# Patient Record
Sex: FEMALE | Race: WHITE | Employment: OTHER | URBAN - METROPOLITAN AREA
[De-identification: names, ages, dates, MRNs, and addresses within clinical notes are randomized per-mention and may not be internally consistent; named-entity substitution may affect disease eponyms.]

---

## 2018-04-20 LAB
CHOLEST SERPL-MCNC: 228 MG/DL (ref 100–199)
HBA1C MFR BLD: 5.6 % (ref 0–5.7)
HDLC SERPL-MCNC: 61 MG/DL
LDLC SERPL CALC-MCNC: 118 MG/DL
NONHDLC SERPL-MCNC: 167 MG/DL
TRIGL SERPL-MCNC: 245 MG/DL
TSH SERPL-ACNC: 0.45 UIU/ML (ref 0.35–4.94)

## 2018-06-06 ENCOUNTER — TRANSFERRED RECORDS (OUTPATIENT)
Dept: HEALTH INFORMATION MANAGEMENT | Facility: CLINIC | Age: 58
End: 2018-06-06

## 2018-07-10 ENCOUNTER — TRANSFERRED RECORDS (OUTPATIENT)
Dept: HEALTH INFORMATION MANAGEMENT | Facility: CLINIC | Age: 58
End: 2018-07-10

## 2018-09-12 ENCOUNTER — TELEPHONE (OUTPATIENT)
Dept: FAMILY MEDICINE | Facility: CLINIC | Age: 58
End: 2018-09-12

## 2018-09-12 NOTE — TELEPHONE ENCOUNTER
Please abstract the following data from this visit with this patient into the appropriate field in Epic:    Lipids done on 4- at [facility]  A1C done on 4- at [facility]  TSH done on 06- at [facility]    Mammogram done on 12-8-2016 at [facility]-repeat in one year    Care everywhere was completed.

## 2018-10-03 NOTE — PROGRESS NOTES
HPI:    I am seeing Skylar Bagley for the 1st time. She is a 58 year old female here to discuss:    Type 2 diabetes - dx'd around around age 55. Checks glucose about 3 times per day. Results are around . Denies symptoms of high glucose. Gets lows about 2 times per week - corrects with pop.  Evaluation and treatment:    Eye exam - she says this is set up for 10/16/18 - I asked her to have them send us records.    Foot exam - fine 10/15/18   Urine albumin - negative 10/15/18.   ASA - to be recommended at age 60.   Metformin 850 mg bid - no side effects.   Glyburide 2.5 mg bid - likely causing hypoglycemia - I will recommend reducing to once per day or eliminating all together.   Counseled about the various complications of diabetes, weight loss via diet, exercise, checking glucose levels, self foot check and managing hypoglycemia.    Lab Results   Component Value Date    A1C 5.5 10/15/2018    A1C 5.6 04/20/2018    A1C 8.6 09/15/2007     Obesity -   Evaluation and treatment:    Diet and exercise discussed.    Body mass index is 31.52 kg/(m^2).  Wt Readings from Last 5 Encounters:   10/15/18 168 lb 3.2 oz (76.3 kg)       Dyslipidemia - no h/o vascular disease like CAD, PAD, CVA but has h/o diabetes.   Evaluation and treatment:    Per ATP, moderate to high instensity statin recommended.   Counseling done today regarding healthy weight, diet and exercise.    Lipitor 40 mg every day - no side effects.   Continue same tx.       Recent Labs   Lab Test  10/15/18   1407 04/20/18   CHOL  130  228*   HDL  55  61   LDL  60  118   TRIG  75  245*     RA -   Evaluation and treatment:    Follows with rheumatologist. Macy Felipe, [facility] - p [phone]  Fax [phone].    Hypothyroidism - denies thyroid type symptoms.  Evaluation and treatment:    Synthroid 112 mcg every day - no side effects.   She will be contacted and asked to reduce the dose to 100 mcg every day.     TSH   Date  "Value Ref Range Status   10/15/2018 0.08 (L) 0.40 - 4.00 mU/L Final     T4 Free   Date Value Ref Range Status   10/15/2018 1.21 0.76 - 1.46 ng/dL Final       Depression, psychosis -   Evaluation and treatment:    follows with psychiatry     Preventive - we gave her flu shot and and Prevnar. I advised to check with her rheumatologist about Shingrix vaccine.    Immunization History   Administered Date(s) Administered     Influenza Quad, Recombinant, p-free (RIV4) 10/15/2018     Influenza Vaccine IM 3yrs+ 4 Valent IIV4 10/07/2016, 10/18/2017     Pneumo Conj 13-V (2010&after) 10/15/2018     Pneumococcal 23 valent 04/19/2016     TD (ADULT, 7+) 09/13/2016       -STD screen: declines    -Mammogram: ordered    -PAP:     No results found for: PAP    - Colon CA screen: declines Colonoscopy. Accepted FIT which I ordered.    - Hep C screen:     - Advanced Directive:     - Lung cancer screen:       ROS:    Const: No fevers, weight changes or night sweats recently.  ENT: No runny nose, sore throat or ear pain.  Resp: No cough or shortness of breath.  CV: No chest pain, dizziness or cardiac palpitations.  GI: No nausea, vomiting, diarrhea or constipation. Denies blood in stools or black stools.  : No dysuria, frequency or hematuria.  The rest of the ROS negative, other than listed on HPI    SH:    Marital status:   Kids: 1 daughter  Employment: disabled  Exercise: walking 3 times per week  Tobacco: quit and restarted   Etoh: no  Recreational drugs: no  Caffeine: no    Exam:    /80  Pulse 92  Temp 97.3  F (36.3  C) (Oral)  Ht 5' 1.25\" (1.556 m)  Wt 168 lb 3.2 oz (76.3 kg)  SpO2 95%  BMI 31.52 kg/m2    Gen: Healthy appearing female in no acute distress  ENT: Oropharynx normal. Oral mucosa moist without lesions.  Eyes: Conjunctiva and sclera normal. Pupils react normally to light. No nystagmus.  Neck: No enlarged lymph nodes, thyromegally or other masses.  Lungs: Good air movement and otherwise clear.  CV: Heart " RRR with no murmurs. No JVD, carotid bruits or leg edema.  Abd: Soft, non tender, non distended, with normal bowel sounds. No liver or spleen enlargement. No masses. No hernias.  Psych: Affect is normal. She seems a bit sedated. Speech is fluent. Thought logical. Insight and judgement seem to be intact. Denies SI or HI.  Foot exam: Both feet appear normal by inspection. No calluses or other lesions. DP pluses are normal. The tips of the toes are warm with good capillary refill. Sensation intact per monofilament.        Assessment and Plan - Decision Making    1. Type 2 diabetes mellitus without complication, without long-term current use of insulin (H)    Per HPI    - CBC with platelets differential  - Albumin Random Urine Quantitative with Creat Ratio  - Hemoglobin A1c  - ACE/ARB/ARNI NOT PRESCRIBED, INTENTIONAL,; Please choose reason not prescribed, below  - T4 free  - T4 free    2. Dyslipidemia    Per HPI    - Lipid panel reflex to direct LDL Fasting  - Comprehensive metabolic panel  - atorvastatin (LIPITOR) 40 MG tablet; Take 1 tablet (40 mg) by mouth daily  Dispense: 90 tablet; Refill: 3    3. Class 1 obesity with serious comorbidity and body mass index (BMI) of 31.0 to 31.9 in adult, unspecified obesity type    Per HPI    4. Hypothyroidism, unspecified type    Per HPI    - TSH with free T4 reflex    5. Visit for screening mammogram    Per HPI    - MA Screening Digital Bilateral; Future    6. Screen for colon cancer    Per HPI    - Fecal colorectal cancer screen (FIT); Future    7. Need for vaccination    Per HPI    - Pneumococcal vaccine 13 valent PCV13 IM (Prevnar) [52101]  - ADMIN: Vaccine, Initial (24575)    8. Need for prophylactic vaccination and inoculation against influenza    Per HPI    - FLU VACCINE, (RIV4) RECOMBINANT HA  , IM (FluBlok, egg free) [18798]- >18 YRS ([facility] recommended  50-64 YRS)  - Vaccine Administration, Each Additional [70555]      Written instructions given as follows:    Patient  Instructions   1. I will contact you with results.    2. Don't forget to mail in the stool card.    3. If all is well, see you in 6 months for follow-up with fasting labs.

## 2018-10-15 ENCOUNTER — OFFICE VISIT (OUTPATIENT)
Dept: FAMILY MEDICINE | Facility: CLINIC | Age: 58
End: 2018-10-15
Payer: MEDICARE

## 2018-10-15 VITALS
HEIGHT: 61 IN | SYSTOLIC BLOOD PRESSURE: 127 MMHG | DIASTOLIC BLOOD PRESSURE: 80 MMHG | HEART RATE: 92 BPM | WEIGHT: 168.2 LBS | BODY MASS INDEX: 31.75 KG/M2 | OXYGEN SATURATION: 95 % | TEMPERATURE: 97.3 F

## 2018-10-15 DIAGNOSIS — E78.5 DYSLIPIDEMIA: ICD-10-CM

## 2018-10-15 DIAGNOSIS — Z12.31 VISIT FOR SCREENING MAMMOGRAM: ICD-10-CM

## 2018-10-15 DIAGNOSIS — Z23 NEED FOR PROPHYLACTIC VACCINATION AND INOCULATION AGAINST INFLUENZA: ICD-10-CM

## 2018-10-15 DIAGNOSIS — E03.9 HYPOTHYROIDISM, UNSPECIFIED TYPE: ICD-10-CM

## 2018-10-15 DIAGNOSIS — E66.811 CLASS 1 OBESITY WITH SERIOUS COMORBIDITY AND BODY MASS INDEX (BMI) OF 31.0 TO 31.9 IN ADULT, UNSPECIFIED OBESITY TYPE: ICD-10-CM

## 2018-10-15 DIAGNOSIS — Z12.11 SCREEN FOR COLON CANCER: ICD-10-CM

## 2018-10-15 DIAGNOSIS — Z23 NEED FOR VACCINATION: ICD-10-CM

## 2018-10-15 DIAGNOSIS — Z79.899 DRUG THERAPY: Primary | ICD-10-CM

## 2018-10-15 DIAGNOSIS — E11.9 TYPE 2 DIABETES MELLITUS WITHOUT COMPLICATION, WITHOUT LONG-TERM CURRENT USE OF INSULIN (H): Primary | ICD-10-CM

## 2018-10-15 LAB
ALBUMIN SERPL-MCNC: 4.3 G/DL (ref 3.4–5)
ALP SERPL-CCNC: 58 U/L (ref 40–150)
ALT SERPL W P-5'-P-CCNC: 35 U/L (ref 0–50)
ANION GAP SERPL CALCULATED.3IONS-SCNC: 8 MMOL/L (ref 3–14)
AST SERPL W P-5'-P-CCNC: 21 U/L (ref 0–45)
BASOPHILS # BLD AUTO: 0 10E9/L (ref 0–0.2)
BASOPHILS NFR BLD AUTO: 0.4 %
BILIRUB SERPL-MCNC: 0.2 MG/DL (ref 0.2–1.3)
BUN SERPL-MCNC: 15 MG/DL (ref 7–30)
CALCIUM SERPL-MCNC: 9.2 MG/DL (ref 8.5–10.1)
CHLORIDE SERPL-SCNC: 111 MMOL/L (ref 94–109)
CHOLEST SERPL-MCNC: 130 MG/DL
CO2 SERPL-SCNC: 22 MMOL/L (ref 20–32)
CREAT SERPL-MCNC: 0.96 MG/DL (ref 0.52–1.04)
DIFFERENTIAL METHOD BLD: NORMAL
EOSINOPHIL # BLD AUTO: 0.1 10E9/L (ref 0–0.7)
EOSINOPHIL NFR BLD AUTO: 1.5 %
ERYTHROCYTE [DISTWIDTH] IN BLOOD BY AUTOMATED COUNT: 12.8 % (ref 10–15)
GFR SERPL CREATININE-BSD FRML MDRD: 59 ML/MIN/1.7M2
GLUCOSE SERPL-MCNC: 71 MG/DL (ref 70–99)
HBA1C MFR BLD: 5.5 % (ref 0–5.6)
HCT VFR BLD AUTO: 38 % (ref 35–47)
HDLC SERPL-MCNC: 55 MG/DL
HGB BLD-MCNC: 12.8 G/DL (ref 11.7–15.7)
LDLC SERPL CALC-MCNC: 60 MG/DL
LYMPHOCYTES # BLD AUTO: 2.7 10E9/L (ref 0.8–5.3)
LYMPHOCYTES NFR BLD AUTO: 32.8 %
MCH RBC QN AUTO: 31.1 PG (ref 26.5–33)
MCHC RBC AUTO-ENTMCNC: 33.7 G/DL (ref 31.5–36.5)
MCV RBC AUTO: 92 FL (ref 78–100)
MONOCYTES # BLD AUTO: 0.6 10E9/L (ref 0–1.3)
MONOCYTES NFR BLD AUTO: 7.5 %
NEUTROPHILS # BLD AUTO: 4.7 10E9/L (ref 1.6–8.3)
NEUTROPHILS NFR BLD AUTO: 57.8 %
NONHDLC SERPL-MCNC: 75 MG/DL
PLATELET # BLD AUTO: 260 10E9/L (ref 150–450)
POTASSIUM SERPL-SCNC: 4 MMOL/L (ref 3.4–5.3)
PROT SERPL-MCNC: 7.8 G/DL (ref 6.8–8.8)
RBC # BLD AUTO: 4.12 10E12/L (ref 3.8–5.2)
SODIUM SERPL-SCNC: 141 MMOL/L (ref 133–144)
T4 FREE SERPL-MCNC: 1.21 NG/DL (ref 0.76–1.46)
TRIGL SERPL-MCNC: 75 MG/DL
TSH SERPL DL<=0.005 MIU/L-ACNC: 0.08 MU/L (ref 0.4–4)
WBC # BLD AUTO: 8.1 10E9/L (ref 4–11)

## 2018-10-15 PROCEDURE — 84443 ASSAY THYROID STIM HORMONE: CPT | Performed by: FAMILY MEDICINE

## 2018-10-15 PROCEDURE — 82043 UR ALBUMIN QUANTITATIVE: CPT | Performed by: FAMILY MEDICINE

## 2018-10-15 PROCEDURE — 84439 ASSAY OF FREE THYROXINE: CPT | Performed by: FAMILY MEDICINE

## 2018-10-15 PROCEDURE — G0009 ADMIN PNEUMOCOCCAL VACCINE: HCPCS | Performed by: FAMILY MEDICINE

## 2018-10-15 PROCEDURE — G0008 ADMIN INFLUENZA VIRUS VAC: HCPCS | Performed by: FAMILY MEDICINE

## 2018-10-15 PROCEDURE — 80061 LIPID PANEL: CPT | Performed by: FAMILY MEDICINE

## 2018-10-15 PROCEDURE — 82040 ASSAY OF SERUM ALBUMIN: CPT | Performed by: NURSE PRACTITIONER

## 2018-10-15 PROCEDURE — 90670 PCV13 VACCINE IM: CPT | Performed by: FAMILY MEDICINE

## 2018-10-15 PROCEDURE — 85025 COMPLETE CBC W/AUTO DIFF WBC: CPT | Performed by: FAMILY MEDICINE

## 2018-10-15 PROCEDURE — 80053 COMPREHEN METABOLIC PANEL: CPT | Performed by: FAMILY MEDICINE

## 2018-10-15 PROCEDURE — 36415 COLL VENOUS BLD VENIPUNCTURE: CPT | Performed by: FAMILY MEDICINE

## 2018-10-15 PROCEDURE — 83036 HEMOGLOBIN GLYCOSYLATED A1C: CPT | Performed by: FAMILY MEDICINE

## 2018-10-15 PROCEDURE — 90682 RIV4 VACC RECOMBINANT DNA IM: CPT | Performed by: FAMILY MEDICINE

## 2018-10-15 PROCEDURE — 99204 OFFICE O/P NEW MOD 45 MIN: CPT | Mod: 25 | Performed by: FAMILY MEDICINE

## 2018-10-15 RX ORDER — TRAZODONE HYDROCHLORIDE 100 MG/1
100 TABLET ORAL
COMMUNITY
Start: 2017-10-03 | End: 2019-05-15

## 2018-10-15 RX ORDER — LEVOTHYROXINE SODIUM 112 UG/1
TABLET ORAL
COMMUNITY
Start: 2018-06-25 | End: 2018-10-16

## 2018-10-15 RX ORDER — ATORVASTATIN CALCIUM 40 MG/1
40 TABLET, FILM COATED ORAL
COMMUNITY
Start: 2018-09-08 | End: 2018-10-16

## 2018-10-15 RX ORDER — FLUOXETINE 40 MG/1
40 CAPSULE ORAL
COMMUNITY
Start: 2018-09-06 | End: 2019-05-15

## 2018-10-15 RX ORDER — CLONAZEPAM 2 MG/1
TABLET ORAL
COMMUNITY
Start: 2017-06-30 | End: 2019-05-15

## 2018-10-15 RX ORDER — GLYBURIDE 5 MG/1
2.5 TABLET ORAL 2 TIMES DAILY
COMMUNITY
Start: 2018-06-06 | End: 2019-01-04

## 2018-10-15 RX ORDER — RISPERIDONE 3 MG/1
3 TABLET ORAL
COMMUNITY
Start: 2018-04-20 | End: 2019-06-21

## 2018-10-15 RX ORDER — FLUTICASONE PROPIONATE 50 MCG
1 SPRAY, SUSPENSION (ML) NASAL
COMMUNITY
Start: 2018-02-26 | End: 2019-12-13

## 2018-10-15 RX ORDER — ATORVASTATIN CALCIUM 40 MG/1
40 TABLET, FILM COATED ORAL DAILY
Qty: 90 TABLET | Refills: 3 | Status: SHIPPED | OUTPATIENT
Start: 2018-10-15 | End: 2019-09-10

## 2018-10-15 RX ORDER — IBUPROFEN 800 MG/1
800 TABLET, FILM COATED ORAL
COMMUNITY
Start: 2017-10-04 | End: 2018-11-25

## 2018-10-15 RX ORDER — HYDROXYCHLOROQUINE SULFATE 200 MG/1
300 TABLET, FILM COATED ORAL
COMMUNITY
Start: 2017-10-04 | End: 2018-11-25

## 2018-10-15 NOTE — MR AVS SNAPSHOT
After Visit Summary   10/15/2018    Skylar Bagley    MRN: [id number]           Patient Information     Date Of Birth          1960        Visit Information        Provider Department      10/15/2018 1:30 PM Toñito Cortez MD [facility]        Today's Diagnoses     Type 2 diabetes mellitus without complication, without long-term current use of insulin (H)    -  1    Dyslipidemia        Class 1 obesity with serious comorbidity and body mass index (BMI) of 31.0 to 31.9 in adult, unspecified obesity type        Hypothyroidism, unspecified type        Visit for screening mammogram        Screen for colon cancer          Care Instructions    1. I will contact you with results.    2. Don't forget to mail in the stool card.    3. If all is well, see you in 6 months for follow-up with fasting labs.          Follow-ups after your visit        Your next 10 appointments already scheduled     Nov 07, 2018  2:30 PM CST   New Visit with Jimbo Hancock MD   [facility] ([address] [address] [phone]              Future tests that were ordered for you today     Open Future Orders        Priority Expected Expires Ordered    Fecal colorectal cancer screen (FIT) Routine 11/5/2018 1/7/2019 10/15/2018    MA Screening Digital Bilateral Routine  10/15/2019 10/15/2018            Who to contact     If you have questions or need follow up information about today's clinic visit or your schedule please contact [facility] directly at [phone].  Normal or non-critical lab and imaging results will be communicated to you by MyChart, letter or phone within 4 business days after the clinic has received the results. If you do not hear from us within 7 days, please contact the clinic through MyChart or phone. If you have a critical or abnormal lab result, we will notify you by phone as soon as possible.  Submit refill  "requests through [facility] or call your pharmacy and they will forward the refill request to us. Please allow 3 business days for your refill to be completed.          Additional Information About Your Visit        Care EveryWhere ID     This is your Care EveryWhere ID. This could be used by other organizations to access your [facility] medical records  [id number]        Your Vitals Were     Pulse Temperature Height Pulse Oximetry BMI (Body Mass Index)       92 97.3  F (36.3  C) (Oral) 5' 1.25\" (1.556 m) 95% 31.52 kg/m2        Blood Pressure from Last 3 Encounters:   10/15/18 127/80    Weight from Last 3 Encounters:   10/15/18 168 lb 3.2 oz (76.3 kg)              We Performed the Following     Albumin Random Urine Quantitative with Creat Ratio     CBC with platelets differential     Comprehensive metabolic panel     Hemoglobin A1c     Lipid panel reflex to direct LDL Fasting     TSH with free T4 reflex          Today's Medication Changes          These changes are accurate as of 10/15/18  1:58 PM.  If you have any questions, ask your nurse or doctor.               Start taking these medicines.        Dose/Directions    ACE/ARB/ARNI NOT PRESCRIBED (INTENTIONAL)   Used for:  Type 2 diabetes mellitus without complication, without long-term current use of insulin (H)   Started by:  Toñito Cortez MD        Please choose reason not prescribed, below   Refills:  0         These medicines have changed or have updated prescriptions.        Dose/Directions    * atorvastatin 40 MG tablet   Commonly known as:  LIPITOR   This may have changed:  Another medication with the same name was added. Make sure you understand how and when to take each.   Changed by:  Toñito Cortez MD        Dose:  40 mg   Take 40 mg by mouth   Refills:  0       * atorvastatin 40 MG tablet   Commonly known as:  LIPITOR   This may have changed:  You were already taking a medication with the same name, and this prescription was added. Make sure you " understand how and when to take each.   Used for:  Dyslipidemia   Changed by:  Toñito Cortez MD        Dose:  40 mg   Take 1 tablet (40 mg) by mouth daily   Quantity:  90 tablet   Refills:  3       * Notice:  This list has 2 medication(s) that are the same as other medications prescribed for you. Read the directions carefully, and ask your doctor or other care provider to review them with you.         Where to get your medicines      These medications were sent to [facility] - [facility] [address]  [address], [facility] [address]     Phone:  [phone]     atorvastatin 40 MG tablet         Some of these will need a paper prescription and others can be bought over the counter.  Ask your nurse if you have questions.     You don't need a prescription for these medications     ACE/ARB/ARNI NOT PRESCRIBED (INTENTIONAL)                Primary Care Provider Office Phone # Fax #    [facility] [phone] [address] [address]        Equal Access to Services     MILY BACON AH: Hadii aad ku hadasho Soomaali, waaxda luqadaha, qaybta kaalmada adeegyada, waxay idiin haychristaln dorys valentine . So [facility] [phone].    ATENCIÓN: Si habla español, tiene a harris disposición servicios gratuitos de asistencia lingüística. Ll[facility] [phone].    We comply with applicable federal civil rights laws and [place] laws. We do not discriminate on the basis of race, color, national origin, age, disability, sex, sexual orientation, or gender identity.            Thank you!     Thank you for choosing [facility]  for your care. Our goal is always to provide you with excellent care. Hearing back from our patients is one way we can continue to improve our services. Please take a few minutes to complete the written survey that you may receive in the mail after your visit with us. Thank you!             Your Updated Medication List -  Protect others around you: Learn how to safely use, store and throw away your medicines at www.disposemymeds.org.          This list is accurate as of 10/15/18  1:58 PM.  Always use your most recent med list.                   Brand Name Dispense Instructions for use Diagnosis    ACE/ARB/ARNI NOT PRESCRIBED (INTENTIONAL)      Please choose reason not prescribed, below    Type 2 diabetes mellitus without complication, without long-term current use of insulin (H)       * atorvastatin 40 MG tablet    LIPITOR     Take 40 mg by mouth        * atorvastatin 40 MG tablet    LIPITOR    90 tablet    Take 1 tablet (40 mg) by mouth daily    Dyslipidemia       calcium carbonate 500 mg-vitamin D 200 units 500-200 MG-UNIT per tablet    OSCAL with D;OYSTER SHELL CALCIUM     Take 1 tablet by mouth        clonazePAM 2 MG tablet    klonoPIN     1 tablet AM and 2 tablets QHS        CONTOUR NEXT TEST test strip   Generic drug:  blood glucose monitoring      TEST 4 TIMES A DAY        etanercept 50 MG/ML injection    ENBREL     Inject 50 mg Subcutaneous once a week        FLUoxetine 40 MG capsule    PROzac     Take 40 mg by mouth        fluticasone 50 MCG/ACT spray    FLONASE     Spray 1 spray in nostril        glyBURIDE 5 MG tablet    DIABETA /MICRONASE     Take 2.5 mg by mouth 2 times daily        hydroxychloroquine 200 MG tablet    PLAQUENIL     Take 300 mg by mouth        ibuprofen 800 MG tablet    ADVIL/MOTRIN     Take 800 mg by mouth        Lancets 30G Misc      Test 4 times per day. ICD-10 code E11.9        levothyroxine 112 MCG tablet    SYNTHROID/LEVOTHROID          metFORMIN 850 MG tablet    GLUCOPHAGE     Take 850 mg by mouth        risperiDONE 3 MG tablet    risperDAL     Take 3 mg by mouth        traZODone 100 MG tablet    DESYREL     Take 100 mg by mouth        UNABLE TO FIND      remicade        UNABLE TO FIND      resperidone        * Notice:  This list has 2 medication(s) that are the same as other medications prescribed  for you. Read the directions carefully, and ask your doctor or other care provider to review them with you.

## 2018-10-15 NOTE — PATIENT INSTRUCTIONS
1. I will contact you with results.    2. Don't forget to mail in the stool card.    3. If all is well, see you in 6 months for follow-up with fasting labs.

## 2018-10-15 NOTE — PROGRESS NOTES

## 2018-10-15 NOTE — NURSING NOTE
Screening Questionnaire for Adult Immunization    Are you sick today?   No   Do you have allergies to medications, food, a vaccine component or latex?   No   Have you ever had a serious reaction after receiving a vaccination?   No   Do you have a long-term health problem with heart disease, lung disease, asthma, kidney disease, metabolic disease (e.g. diabetes), anemia, or other blood disorder?   No   Do you have cancer, leukemia, HIV/AIDS, or any other immune system problem?   No   In the past 3 months, have you taken medications that affect  your immune system, such as prednisone, other steroids, or anticancer drugs; drugs for the treatment of rheumatoid arthritis, Crohn s disease, or psoriasis; or have you had radiation treatments?   Yes   Have you had a seizure, or a brain or other nervous system problem?   No   During the past year, have you received a transfusion of blood or blood     products, or been given immune (gamma) globulin or antiviral drug?   No   For women: Are you pregnant or is there a chance you could become        pregnant during the next month?   No   Have you received any vaccinations in the past 4 weeks?   No     Immunization questionnaire was positive for at least one answer.  Notified Dr Cortez.        Per orders of Dr. Cortez, injection of Prevnar & Influenza given by Shanta Costello. Patient instructed to remain in clinic for 15 minutes afterwards, and to report any adverse reaction to me immediately.       Screening performed by Shanta Costello on 10/15/2018 at 1:56 PM.

## 2018-10-16 ENCOUNTER — TRANSFERRED RECORDS (OUTPATIENT)
Dept: HEALTH INFORMATION MANAGEMENT | Facility: CLINIC | Age: 58
End: 2018-10-16

## 2018-10-16 DIAGNOSIS — M06.09 RHEUMATOID ARTHRITIS OF MULTIPLE SITES WITHOUT RHEUMATOID FACTOR (H): Primary | ICD-10-CM

## 2018-10-16 DIAGNOSIS — Z79.899 HIGH RISK MEDICATION USE: ICD-10-CM

## 2018-10-16 LAB
ALBUMIN SERPL-MCNC: 4.4 G/DL (ref 3.4–5)
CREAT UR-MCNC: 63 MG/DL
MICROALBUMIN UR-MCNC: 5 MG/L
MICROALBUMIN/CREAT UR: 8.45 MG/G CR (ref 0–25)

## 2018-10-16 RX ORDER — LEVOTHYROXINE SODIUM 100 UG/1
100 TABLET ORAL DAILY
Qty: 90 TABLET | Refills: 3 | Status: SHIPPED | OUTPATIENT
Start: 2018-10-16 | End: 2019-07-22

## 2018-10-17 ENCOUNTER — TELEPHONE (OUTPATIENT)
Dept: FAMILY MEDICINE | Facility: CLINIC | Age: 58
End: 2018-10-17

## 2018-10-17 NOTE — TELEPHONE ENCOUNTER
Please call patient:     1. Your diabetes is well controlled. Since you are having some low glucose levels, you should reduce Glyburide to once per day or even eliminate all together.       2. The thyroid dose is too high. Reduce the Synthroid to 100 mcg daily.      3. All other results are fine.     4. See you in 6 months. Please don't forget to mail in the stool card    Toñito Cortez M.D.

## 2018-10-17 NOTE — TELEPHONE ENCOUNTER
Pt notified of provider message as written.  Pt verbalized good understanding.      Pt requests the results and result note be faxed to her rheumatologist.    July NAIDUN, RN

## 2018-10-17 NOTE — PROGRESS NOTES
We will recommend reducing Glyburide to once per day or even eliminate all together. We will also have her reduce the Synthroid to 100 mcg daily.     See telephone encounter.    Toñito Cortez M.D.

## 2018-10-17 NOTE — LETTER
October 17, 2018    Skylar Bagley  [address]  [place] [address]        Dear Skylar,    We will recommend reducing Glyburide to once per day or even eliminate all together. We will also have her reduce the Synthroid to 100 mcg daily.       Toñito Cortez M.D.    Results for orders placed or performed in visit on 10/15/18   Albumin level   Result Value Ref Range    Albumin 4.4 3.4 - 5.0 g/dL

## 2018-10-17 NOTE — TELEPHONE ENCOUNTER
Mailed lab letter to patient's home.     Faxed labs to Dr Matthews at [facility] @ [phone]    Yamileth Cueva MA/ASHUTOSH

## 2018-10-17 NOTE — TELEPHONE ENCOUNTER
Please abstract the following data from this visit with this patient into the appropriate field in Epic:    Eye exam with ophthalmology on this date: 10/16/18

## 2018-10-19 NOTE — TELEPHONE ENCOUNTER
Patient states needs labs faxed to rheumatologist. Attn: Macy. [phone]    Inna NAIDUN, RN, CPN

## 2018-10-24 PROCEDURE — 82274 ASSAY TEST FOR BLOOD FECAL: CPT | Performed by: FAMILY MEDICINE

## 2018-10-28 DIAGNOSIS — Z12.11 SCREEN FOR COLON CANCER: ICD-10-CM

## 2018-10-28 LAB — HEMOCCULT STL QL IA: NEGATIVE

## 2018-11-01 ENCOUNTER — TELEPHONE (OUTPATIENT)
Dept: FAMILY MEDICINE | Facility: CLINIC | Age: 58
End: 2018-11-01

## 2018-11-01 NOTE — TELEPHONE ENCOUNTER
Pt states her rheumatologist Dr. Knott took her off of ibuprofen and omeprazole 30 mg daily after receiving the last lab results from Dr. Toñito Cortez.  Pt states Dr. Knott said the results showed pt had an ulcer and should not be on these meds.  Pt started the omeprazole after her appointment with Dr. Toñito Cortez.  She states her plaquenil was stopped due to it damaging her eyes.  She only takes Enbrel for RA pain now.  She is asking what else she can take and would like Dr. Toñito Cortez to prescribe omeprazole and ibuprofen for her.    I asked for Dr. Knott's office phone number to clarify what is going on, ph. [phone].    I called and spoke with Dr. Knott's nurse.  Pt was seen at there clinic on Oct 9 and reported severe abdominal pain and reflux.  There provider gave her enough omeprazole to make it to her appointment with Dr. Toñito Cortez and she was to ask him to evaluate her for a stomach ulcer.  His findings would determine if they could continue to prescribe ibuprofen.   No mention of this in Dr. Toñito Cortez's ov notes.     I called pt and advised her rheumatologist wants Dr. Toñito Cortez to evaluate her for an ulcer before prescribing more medication.  I offered her appointment Friday morning and Monday morning and she declined.  Appointment made for Monday afternoon.   To provider as MAGUE.  July MOSQUEDA, RN

## 2018-11-01 NOTE — TELEPHONE ENCOUNTER
Patient calling she would like to discuss her medications and her lab results. She needs a prescription for omeprazole and something for pain.

## 2018-11-05 ENCOUNTER — TELEPHONE (OUTPATIENT)
Dept: FAMILY MEDICINE | Facility: CLINIC | Age: 58
End: 2018-11-05

## 2018-11-05 NOTE — TELEPHONE ENCOUNTER
Pt asking what last lab letter she received meant.  Advised she had a negative or normal result on her FIT card and that test is screening for colon cancer.  No further questions.    I asked pt why she cancelled her appointment today.  She states she does not have a ride. Appointment rescheduled for Thursday.  July NAIDUN, RN

## 2018-11-12 ENCOUNTER — HEALTH MAINTENANCE LETTER (OUTPATIENT)
Age: 58
End: 2018-11-12

## 2018-11-23 ENCOUNTER — OFFICE VISIT (OUTPATIENT)
Dept: FAMILY MEDICINE | Facility: CLINIC | Age: 58
End: 2018-11-23
Payer: MEDICARE

## 2018-11-23 VITALS
DIASTOLIC BLOOD PRESSURE: 74 MMHG | HEART RATE: 96 BPM | TEMPERATURE: 98 F | SYSTOLIC BLOOD PRESSURE: 120 MMHG | OXYGEN SATURATION: 96 % | WEIGHT: 170 LBS | BODY MASS INDEX: 31.86 KG/M2

## 2018-11-23 DIAGNOSIS — M06.9 RHEUMATOID ARTHRITIS, INVOLVING UNSPECIFIED SITE, UNSPECIFIED RHEUMATOID FACTOR PRESENCE: ICD-10-CM

## 2018-11-23 DIAGNOSIS — K21.9 GASTROESOPHAGEAL REFLUX DISEASE, ESOPHAGITIS PRESENCE NOT SPECIFIED: Primary | ICD-10-CM

## 2018-11-23 PROCEDURE — 99214 OFFICE O/P EST MOD 30 MIN: CPT | Performed by: FAMILY MEDICINE

## 2018-11-23 RX ORDER — OMEPRAZOLE 40 MG/1
CAPSULE, DELAYED RELEASE ORAL
COMMUNITY
Start: 2018-10-12 | End: 2020-01-23

## 2018-11-23 RX ORDER — IBUPROFEN 800 MG/1
800 TABLET, FILM COATED ORAL 2 TIMES DAILY PRN
Qty: 90 TABLET | Refills: 1 | Status: SHIPPED | OUTPATIENT
Start: 2018-11-23 | End: 2019-02-04

## 2018-11-23 ASSESSMENT — ANXIETY QUESTIONNAIRES
5. BEING SO RESTLESS THAT IT IS HARD TO SIT STILL: SEVERAL DAYS
2. NOT BEING ABLE TO STOP OR CONTROL WORRYING: SEVERAL DAYS
GAD7 TOTAL SCORE: 6
1. FEELING NERVOUS, ANXIOUS, OR ON EDGE: SEVERAL DAYS
IF YOU CHECKED OFF ANY PROBLEMS ON THIS QUESTIONNAIRE, HOW DIFFICULT HAVE THESE PROBLEMS MADE IT FOR YOU TO DO YOUR WORK, TAKE CARE OF THINGS AT HOME, OR GET ALONG WITH OTHER PEOPLE: SOMEWHAT DIFFICULT
7. FEELING AFRAID AS IF SOMETHING AWFUL MIGHT HAPPEN: NOT AT ALL
6. BECOMING EASILY ANNOYED OR IRRITABLE: SEVERAL DAYS
3. WORRYING TOO MUCH ABOUT DIFFERENT THINGS: SEVERAL DAYS

## 2018-11-23 ASSESSMENT — PATIENT HEALTH QUESTIONNAIRE - PHQ9
5. POOR APPETITE OR OVEREATING: SEVERAL DAYS
SUM OF ALL RESPONSES TO PHQ QUESTIONS 1-9: 6

## 2018-11-23 NOTE — NURSING NOTE
"Chief Complaint   Patient presents with     RECHECK     Ulcer       Initial /74 (Cuff Size: Adult Regular)  Pulse 96  Temp 98  F (36.7  C) (Oral)  Wt 170 lb (77.1 kg)  SpO2 96%  BMI 31.86 kg/m2 Estimated body mass index is 31.86 kg/(m^2) as calculated from the following:    Height as of 10/15/18: 5' 1.25\" (1.556 m).    Weight as of this encounter: 170 lb (77.1 kg).      Courtney Pierre LPN    "

## 2018-11-23 NOTE — PROGRESS NOTES
HPI:    Skylar  is a 58 year old female here to discuss:    GERD - she used to get heart burn a lot - mostly epigastric pain without any chest or throat burning. When she stopped her Plaquenil, her pain resolved. She asked her rheumatologist for Ibuprofen but they told her she needed testing for stomach ulcer first.  Evaluation and treatment:    We discussed doing an EGD but since her pain has resolved after discontinuing Plaquenil, there is not need.   I called her rheumatologist (see below for phone number) but they are closed today.   I will have my nurse call next week.    RA -   Evaluation and treatment:    Follows with rheumatologist. Macy Felipe, [facility] - Phone [phone]  Fax [phone].   The patient wanted Ibuprofen refill - I gave that after discussing side effects.   She wants to see a different rheumatologist - I gave her referral to [facility].    The following issues were reviewed but not addressed today.    Type 2 diabetes - dx'd around around age 55. Checks glucose about 3 times per day. Results are around . Denies symptoms of high glucose. Gets lows about 2 times per week - corrects with pop.  Evaluation and treatment:    Eye exam - she says this is set up for 10/16/18 - I asked her to have them send us records.    Foot exam - fine 10/15/18   Urine albumin - negative 10/15/18.   ASA - to be recommended at age 60.   Metformin 850 mg bid - no side effects.   Glyburide 2.5 mg bid - likely causing hypoglycemia - I will recommend reducing to once per day or eliminating all together.   Counseled about the various complications of diabetes, weight loss via diet, exercise, checking glucose levels, self foot check and managing hypoglycemia.    Lab Results   Component Value Date    A1C 5.5 10/15/2018    A1C 5.6 04/20/2018    A1C 8.6 09/15/2007     Obesity -   Evaluation and treatment:    Diet and exercise discussed.    Body mass index is 31.86 kg/(m^2).  Wt  Readings from Last 5 Encounters:   11/23/18 170 lb (77.1 kg)   10/15/18 168 lb 3.2 oz (76.3 kg)       Dyslipidemia - no h/o vascular disease like CAD, PAD, CVA but has h/o diabetes.   Evaluation and treatment:    Per ATP, moderate to high instensity statin recommended.   Counseling done today regarding healthy weight, diet and exercise.    Lipitor 40 mg every day - no side effects.   Continue same tx.       Recent Labs   Lab Test  10/15/18   1407 04/20/18   CHOL  130  228*   HDL  55  61   LDL  60  118   TRIG  75  245*       Hypothyroidism - denies thyroid type symptoms.  Evaluation and treatment:    Synthroid 112 mcg every day - no side effects.   She will be contacted and asked to reduce the dose to 100 mcg every day.     TSH   Date Value Ref Range Status   10/15/2018 0.08 (L) 0.40 - 4.00 mU/L Final     T4 Free   Date Value Ref Range Status   10/15/2018 1.21 0.76 - 1.46 ng/dL Final       Depression, psychosis -   Evaluation and treatment:    follows with psychiatry     Preventive - we gave her flu shot and and Prevnar. I advised to check with her rheumatologist about Shingrix vaccine.    Immunization History   Administered Date(s) Administered     Influenza Quad, Recombinant, p-free (RIV4) 10/15/2018     Influenza Vaccine IM 3yrs+ 4 Valent IIV4 10/07/2016, 10/18/2017     Pneumo Conj 13-V (2010&after) 10/15/2018     Pneumococcal 23 valent 04/19/2016     TD (ADULT, 7+) 09/13/2016       -STD screen: declines    -Mammogram: ordered previously.    -PAP:     No results found for: PAP    - Colon CA screen: declines Colonoscopy. Accepted FIT - negative 10/24/18.    - Hep C screen:     - Advanced Directive:     - Lung cancer screen:       ROS:    Const: No fevers, weight changes or night sweats recently.  ENT: No runny nose, sore throat or ear pain.  Resp: No cough or shortness of breath.  CV: No chest pain, dizziness or cardiac palpitations.  GI: No nausea, vomiting, diarrhea or constipation. Denies blood in stools or  "black stools.  : No dysuria, frequency or hematuria.  The rest of the ROS negative, other than listed on HPI    SH:    Marital status:   Kids: 1 daughter  Employment: disabled  Exercise: walking 3 times per week  Tobacco: quit and restarted   Etoh: no  Recreational drugs: no  Caffeine: no    Exam:    /74 (Cuff Size: Adult Regular)  Pulse 96  Temp 98  F (36.7  C) (Oral)  Wt 170 lb (77.1 kg)  SpO2 96%  BMI 31.86 kg/m2    Gen: Healthy appearing female in no acute distress  Neck: No enlarged lymph nodes, thyromegally or other masses.  Lungs: Good air movement and otherwise clear.  CV: Heart RRR with no murmurs. No JVD, carotid bruits or leg edema.  Abd: Soft, non tender, non distended, with normal bowel sounds. No liver or spleen enlargement. No masses. No hernias.  Psych: Affect is normal. She seems a bit drowsy at baseline. Speech is fluent but slow at baseline. Thought logical. Insight and judgement seem to be intact. Denies SI or HI.    Assessment and Plan - Decision Making    1. Gastroesophageal reflux disease, esophagitis presence not specified    Per HPI      2. Rheumatoid arthritis, involving unspecified site, unspecified rheumatoid factor presence (H)    Per HPI    - ibuprofen (ADVIL/MOTRIN) 800 MG tablet; Take 1 tablet (800 mg) by mouth 2 times daily as needed for moderate pain  Dispense: 90 tablet; Refill: 1  - RHEUMATOLOGY REFERRAL      Written instructions given as follows:    Patient Instructions   Your rheumatology clinic is closed today. I will contact them next week and ask about this \"ulcer test.\"    I will get back to you after that.    If all is well, see you April for follow-up with fasting labs.      "

## 2018-11-23 NOTE — MR AVS SNAPSHOT
"              After Visit Summary   11/23/2018    Skylar Bagley    MRN: [id number]           Patient Information     Date Of Birth          1960        Visit Information        Provider Department      11/23/2018 1:50 PM Toñito Cortez MD [facility]        Today's Diagnoses     Gastroesophageal reflux disease, esophagitis presence not specified    -  1      Care Instructions    Your rheumatology clinic is closed today. I will contact them next week and ask about this \"ulcer test.\"    I will get back to you after that.    If all is well, see you April for follow-up with fasting labs.          Follow-ups after your visit        Follow-up notes from your care team     Return in about 5 months (around 4/23/2019) for Lab Work, Routine Visit.      Your next 10 appointments already scheduled     Jan 07, 2019  1:15 PM CST   New Visit with Jimbo Hancock MD   [facility] ([facility]    [address] [place] [id number] [phone]              Who to contact     If you have questions or need follow up information about today's clinic visit or your schedule please contact [facility] directly at [phone].  Normal or non-critical lab and imaging results will be communicated to you by MyChart, letter or phone within 4 business days after the clinic has received the results. If you do not hear from us within 7 days, please contact the clinic through MyChart or phone. If you have a critical or abnormal lab result, we will notify you by phone as soon as possible.  Submit refill requests through [employer] or call your pharmacy and they will forward the refill request to us. Please allow 3 business days for your refill to be completed.          Additional Information About Your Visit        Care EveryWhere ID     This is your Care EveryWhere ID. This could be used by other organizations to access your [place] medical records  [id number]      "   Your Vitals Were     Pulse Temperature Pulse Oximetry BMI (Body Mass Index)          96 98  F (36.7  C) (Oral) 96% 31.86 kg/m2         Blood Pressure from Last 3 Encounters:   11/23/18 120/74   10/15/18 127/80    Weight from Last 3 Encounters:   11/23/18 170 lb (77.1 kg)   10/15/18 168 lb 3.2 oz (76.3 kg)              Today, you had the following     No orders found for display       Primary Care Provider Office Phone # Fax #    Toñito Cotrez [phone] [address] [facility] [address]        Equal Access to Services     [facility] ARLEEN : Hadii mirtha calzada hadasho Sowale, waaxda luqadaha, qaybta kaalmada adechi, nikia valentine . So [facility] [phone].    ATENCIÓN: Si habla español, tiene a harris disposición servicios gratuitos de asistencia lingüística. Llame al [phone].    We comply with applicable federal civil rights laws and [place] laws. We do not discriminate on the basis of race, color, national origin, age, disability, sex, sexual orientation, or gender identity.            Thank you!     Thank you for choosing [facility]  for your care. Our goal is always to provide you with excellent care. Hearing back from our patients is one way we can continue to improve our services. Please take a few minutes to complete the written survey that you may receive in the mail after your visit with us. Thank you!             Your Updated Medication List - Protect others around you: Learn how to safely use, store and throw away your medicines at www.disposemymeds.org.          This list is accurate as of 11/23/18  2:32 PM.  Always use your most recent med list.                   Brand Name Dispense Instructions for use Diagnosis    ACE/ARB/ARNI NOT PRESCRIBED (INTENTIONAL)      Please choose reason not prescribed, below    Type 2 diabetes mellitus without complication, without long-term current use of insulin (H)       ASPIRIN NOT PRESCRIBED     INTENTIONAL    1 each    Please choose reason not prescribed, below    Type 2 diabetes mellitus without complication, without long-term current use of insulin (H)       atorvastatin 40 MG tablet    LIPITOR    90 tablet    Take 1 tablet (40 mg) by mouth daily    Dyslipidemia       calcium carbonate 500 mg-vitamin D 200 units 500-200 MG-UNIT per tablet    OSCAL with D;OYSTER SHELL CALCIUM     Take 1 tablet by mouth        clonazePAM 2 MG tablet    klonoPIN     1 tablet AM and 2 tablets QHS        CONTOUR NEXT TEST test strip   Generic drug:  blood glucose monitoring      TEST 4 TIMES A DAY        etanercept 50 MG/ML injection    ENBREL     Inject 50 mg Subcutaneous once a week        FLUoxetine 40 MG capsule    PROzac     Take 40 mg by mouth        fluticasone 50 MCG/ACT spray    FLONASE     Spray 1 spray in nostril        glyBURIDE 5 MG tablet    DIABETA /MICRONASE     Take 2.5 mg by mouth 2 times daily        hydroxychloroquine 200 MG tablet    PLAQUENIL     Take 300 mg by mouth        ibuprofen 800 MG tablet    ADVIL/MOTRIN     Take 800 mg by mouth        Lancets 30G Misc      Test 4 times per day. ICD-10 code E11.9        levothyroxine 100 MCG tablet    SYNTHROID/LEVOTHROID    90 tablet    Take 1 tablet (100 mcg) by mouth daily    Hypothyroidism, unspecified type       metFORMIN 850 MG tablet    GLUCOPHAGE     Take 850 mg by mouth        omeprazole 40 MG capsule    priLOSEC          risperiDONE 3 MG tablet    risperDAL     Take 3 mg by mouth        traZODone 100 MG tablet    DESYREL     Take 100 mg by mouth        UNABLE TO FIND      remicade        UNABLE TO FIND      resperidone

## 2018-11-23 NOTE — PATIENT INSTRUCTIONS
"Your rheumatology clinic is closed today. I will contact them next week and ask about this \"ulcer test.\"    I will get back to you after that.    If all is well, see you April for follow-up with fasting labs.  "

## 2018-11-24 ASSESSMENT — ANXIETY QUESTIONNAIRES: GAD7 TOTAL SCORE: 6

## 2018-11-25 ENCOUNTER — TELEPHONE (OUTPATIENT)
Dept: FAMILY MEDICINE | Facility: CLINIC | Age: 58
End: 2018-11-25

## 2018-11-25 NOTE — TELEPHONE ENCOUNTER
Please call Macy Felipe CNP [facility] - Phone [phone].    The patient saw me on 11/23/18 asking for a stomach ulcer test, based on your recommendation. Do you know what test you she is talking about?    Her epigastric pain has resolved since stopping Plaquenil. Does that change your recommendation?    Toñito Cortez M.D.

## 2018-11-26 NOTE — TELEPHONE ENCOUNTER
See 11/1/18 message.  Discussed with Dr. Toñito Cortez who advises pt no longer having any abdominal pain or reflux sx.  Per pt they stopped when she stopped Plaquenil.  Pt is ok to take ibuprofen.  Dr. Toñito Cortez has sent an prescription  for 800 mg bid for pt to the pharmacy.  He is comfortable filling this for her if rheumatology is ok with that.    Left message on answering machine for rheumatology Dr. Knott nurse to call back to [phone].  July NAIDUN, RN

## 2018-11-26 NOTE — TELEPHONE ENCOUNTER
I spoke with nurse Janet that works with Dr. Knott.  She states both Macy Felipe CNP and previous nurse I spoke with are no longer working at that clinic.  I advised her of previous message between me and nurse on 11/1/18.  I advised her of Dr. Toñito Cortez's note and not concerns currently for ulcers and pt symptoms have resolved.  Advised Dr. Toñito Cortez ok with pt taking ibuprofen 800 mg bid and has done prescription and can continue if Dr. Knott would like or they can take over.  She will get message to Dr. Knott.    To provider to cosign for verbal clarifications on first message.  July Mosqueda BSN, RN

## 2018-12-07 ENCOUNTER — TELEPHONE (OUTPATIENT)
Dept: FAMILY MEDICINE | Facility: CLINIC | Age: 58
End: 2018-12-07

## 2018-12-07 NOTE — TELEPHONE ENCOUNTER
Panel Management Review      Patient has the following on her problem list:     Diabetes    ASA: Failed    Last A1C  Lab Results   Component Value Date    A1C 5.5 10/15/2018    A1C 5.6 04/20/2018    A1C 8.6 09/15/2007     A1C tested: Passed    Last LDL:    Lab Results   Component Value Date    CHOL 130 10/15/2018     Lab Results   Component Value Date    HDL 55 10/15/2018     Lab Results   Component Value Date    LDL 60 10/15/2018     Lab Results   Component Value Date    TRIG 75 10/15/2018     No results found for: CHOLHDLRATIO  Lab Results   Component Value Date    NHDL 75 10/15/2018       Is the patient on a Statin? YES             Is the patient on Aspirin? NO    Medications     HMG CoA Reductase Inhibitors    atorvastatin (LIPITOR) 40 MG tablet          Last three blood pressure readings:  BP Readings from Last 3 Encounters:   11/23/18 120/74   10/15/18 127/80       Date of last diabetes office visit: 10-     Tobacco History:     History   Smoking Status     Current Every Day Smoker   Smokeless Tobacco     Never Used           Composite cancer screening  Chart review shows that this patient is due/due soon for the following Pap Smear and Mammogram  Summary:    Patient is due/failing the following:   ASA, MAMMOGRAM and PAP    Action needed:   None needed-patient advised to follow up in April 2019    Type of outreach:    none needed    Questions for provider review:    None                                                                                                                                    Courtney Pierre LPN       Chart routed to closed .

## 2018-12-19 ENCOUNTER — TELEPHONE (OUTPATIENT)
Dept: FAMILY MEDICINE | Facility: CLINIC | Age: 58
End: 2018-12-19

## 2019-01-04 ENCOUNTER — OFFICE VISIT (OUTPATIENT)
Dept: FAMILY MEDICINE | Facility: CLINIC | Age: 59
End: 2019-01-04
Payer: COMMERCIAL

## 2019-01-04 VITALS
OXYGEN SATURATION: 98 % | WEIGHT: 168 LBS | TEMPERATURE: 98 F | DIASTOLIC BLOOD PRESSURE: 77 MMHG | BODY MASS INDEX: 31.72 KG/M2 | HEART RATE: 80 BPM | HEIGHT: 61 IN | SYSTOLIC BLOOD PRESSURE: 122 MMHG

## 2019-01-04 DIAGNOSIS — Z11.3 SCREEN FOR STD (SEXUALLY TRANSMITTED DISEASE): ICD-10-CM

## 2019-01-04 DIAGNOSIS — Z79.899 HIGH RISK MEDICATION USE: ICD-10-CM

## 2019-01-04 DIAGNOSIS — G62.9 PERIPHERAL POLYNEUROPATHY: Primary | ICD-10-CM

## 2019-01-04 DIAGNOSIS — M06.09 RHEUMATOID ARTHRITIS OF MULTIPLE SITES WITHOUT RHEUMATOID FACTOR (H): ICD-10-CM

## 2019-01-04 DIAGNOSIS — J32.9 SINUSITIS, UNSPECIFIED CHRONICITY, UNSPECIFIED LOCATION: ICD-10-CM

## 2019-01-04 LAB
ALBUMIN SERPL-MCNC: 4.5 G/DL (ref 3.4–5)
ALT SERPL W P-5'-P-CCNC: 30 U/L (ref 0–50)
AST SERPL W P-5'-P-CCNC: 15 U/L (ref 0–45)
CREAT SERPL-MCNC: 0.98 MG/DL (ref 0.52–1.04)
ERYTHROCYTE [DISTWIDTH] IN BLOOD BY AUTOMATED COUNT: 12.9 % (ref 10–15)
GFR SERPL CREATININE-BSD FRML MDRD: 63 ML/MIN/{1.73_M2}
HCT VFR BLD AUTO: 40 % (ref 35–47)
HGB BLD-MCNC: 13.3 G/DL (ref 11.7–15.7)
MCH RBC QN AUTO: 30.5 PG (ref 26.5–33)
MCHC RBC AUTO-ENTMCNC: 33.3 G/DL (ref 31.5–36.5)
MCV RBC AUTO: 92 FL (ref 78–100)
PLATELET # BLD AUTO: 272 10E9/L (ref 150–450)
RBC # BLD AUTO: 4.36 10E12/L (ref 3.8–5.2)
VIT B12 SERPL-MCNC: 347 PG/ML (ref 193–986)
WBC # BLD AUTO: 9 10E9/L (ref 4–11)

## 2019-01-04 PROCEDURE — 82607 VITAMIN B-12: CPT | Performed by: FAMILY MEDICINE

## 2019-01-04 PROCEDURE — 86780 TREPONEMA PALLIDUM: CPT | Performed by: FAMILY MEDICINE

## 2019-01-04 PROCEDURE — 36415 COLL VENOUS BLD VENIPUNCTURE: CPT | Performed by: FAMILY MEDICINE

## 2019-01-04 PROCEDURE — 85027 COMPLETE CBC AUTOMATED: CPT | Performed by: FAMILY MEDICINE

## 2019-01-04 PROCEDURE — 82040 ASSAY OF SERUM ALBUMIN: CPT | Performed by: NURSE PRACTITIONER

## 2019-01-04 PROCEDURE — 99214 OFFICE O/P EST MOD 30 MIN: CPT | Performed by: FAMILY MEDICINE

## 2019-01-04 PROCEDURE — 82565 ASSAY OF CREATININE: CPT | Performed by: FAMILY MEDICINE

## 2019-01-04 PROCEDURE — 87491 CHLMYD TRACH DNA AMP PROBE: CPT | Performed by: FAMILY MEDICINE

## 2019-01-04 PROCEDURE — 84460 ALANINE AMINO (ALT) (SGPT): CPT | Performed by: NURSE PRACTITIONER

## 2019-01-04 PROCEDURE — 84450 TRANSFERASE (AST) (SGOT): CPT | Performed by: NURSE PRACTITIONER

## 2019-01-04 PROCEDURE — 87389 HIV-1 AG W/HIV-1&-2 AB AG IA: CPT | Performed by: FAMILY MEDICINE

## 2019-01-04 PROCEDURE — 87591 N.GONORRHOEAE DNA AMP PROB: CPT | Performed by: FAMILY MEDICINE

## 2019-01-04 RX ORDER — AMOXICILLIN 500 MG/1
1000 TABLET, FILM COATED ORAL 2 TIMES DAILY
Qty: 28 TABLET | Refills: 0 | Status: SHIPPED | OUTPATIENT
Start: 2019-01-04 | End: 2019-01-11

## 2019-01-04 RX ORDER — GABAPENTIN 100 MG/1
100 CAPSULE ORAL 3 TIMES DAILY
Qty: 270 CAPSULE | Refills: 3 | Status: SHIPPED | OUTPATIENT
Start: 2019-01-04 | End: 2019-04-22

## 2019-01-04 ASSESSMENT — MIFFLIN-ST. JEOR: SCORE: 1283.38

## 2019-01-04 NOTE — PATIENT INSTRUCTIONS
1. Amoxicillin as prescribed.    2. Gabapentin as prescribed.    3. I will contact you with results.    4. Stop the Glyburide.    4. See you in April fasting.

## 2019-01-04 NOTE — PROGRESS NOTES
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 Slow moving, slow talking.  Evaluation and treatment:    This is usually what I would see with parkinson's.   But I think hers is on the basis of her psychiatric issues.    Peripheral neuropathy - since around Oct 2018 she has had \"pins and needles\" in both feet. Sometimes comes up the legs. No focal weakness.   Evaluation and treatment:   B12 level normal at 347 today 1/4/18.   I think this may be related to her diabetes.   I asked her to try Gabapentin 100 mg tid - side effects discussed.    Sinusitis - since around Oct 2018, she has had sinus congestion and pain. No fevers, ear pain, sore throat cough or shortness of breath.  Evaluation and treatment:    Flonase is not working.   I asked her to try Amoxicillin.    Previous GERD - she used to get heart burn a lot - mostly epigastric pain without any chest or throat burning.   Evaluation and treatment:    When she stopped her Plaquenil, her pain resolved.    She asked her rheumatologist for Ibuprofen but they told her she needed testing for stomach ulcer first.    We previously discussed doing an EGD but since her pain has resolved after discontinuing Plaquenil, there is no need.    RA -   Evaluation and treatment:    Follows with rheumatologist. Macy Felipe, [facility] - Phone [phone]  Fax [phone].   The patient wanted Ibuprofen refill - I gave that after discussing side effects.   She previously wanted to see a different rheumatologist - I previously gave her referral to [facility].    Type 2 diabetes - dx'd around around age 55. Checks glucose about 3 times per day. Results are around . Denies symptoms of high glucose. Gets lows about 2 times per week - corrects with pop.  Evaluation and treatment:    Eye exam - she says this is set up for 10/16/18 - I asked her to have them send us records.    Foot exam - " fine 10/15/18.   Urine albumin - negative 10/15/18.   ASA - to be recommended at age 60.   Metformin 850 mg bid - no side effects.   Glyburide 2.5 mg bid - likely causing hypoglycemia - I asked her to stop this medication.   Counseled about the various complications of diabetes, weight loss via diet, exercise, checking glucose levels, self foot check and managing hypoglycemia.    Lab Results   Component Value Date    A1C 5.5 10/15/2018    A1C 5.6 04/20/2018    A1C 8.6 09/15/2007     Obesity -   Evaluation and treatment:    Diet and exercise discussed.    Body mass index is 31.48 kg/m .  Wt Readings from Last 5 Encounters:   01/04/19 76.2 kg (168 lb)   11/23/18 77.1 kg (170 lb)   10/15/18 76.3 kg (168 lb 3.2 oz)       Dyslipidemia - no h/o vascular disease like CAD, PAD, CVA but has h/o diabetes.   Evaluation and treatment:    Per ATP, moderate to high instensity statin recommended.   Counseling done today regarding healthy weight, diet and exercise.    Lipitor 40 mg every day - no side effects.   Continue same tx.       Recent Labs   Lab Test  10/15/18   1407 04/20/18   CHOL  130  228*   HDL  55  61   LDL  60  118   TRIG  75  245*       Hypothyroidism - denies thyroid type symptoms.  Evaluation and treatment:    Synthroid 112 mcg every day - no side effects.   She will be contacted and asked to reduce the dose to 100 mcg every day.     TSH   Date Value Ref Range Status   10/15/2018 0.08 (L) 0.40 - 4.00 mU/L Final     T4 Free   Date Value Ref Range Status   10/15/2018 1.21 0.76 - 1.46 ng/dL Final       Depression, psychosis -   Evaluation and treatment:    follows with psychiatry     Tobacco abuse - quit and restarted.  Evaluation and treatment:    Counseled - not ready to quit.    Preventive - I advised to check with her rheumatologist about Shingrix vaccine.    Immunization History   Administered Date(s) Administered     Influenza Quad, Recombinant, p-free (RIV4) 10/15/2018     Influenza Vaccine IM 3yrs+ 4 Valent  "IIV4 10/07/2016, 10/18/2017     Pneumo Conj 13-V (2010&after) 10/15/2018     Pneumococcal 23 valent 04/19/2016     TD (ADULT, 7+) 09/13/2016       -STD screen: she has a new partner - uses condoms but not 100%. She wants STD screen which I ordered. Safe sex discussed.    -Mammogram: ordered previously bu she did not do it.    -PAP:     No results found for: PAP    - Colon CA screen: declines Colonoscopy. Accepted FIT - negative 10/24/18.    - Hep C screen:     - Advanced Directive:     - Lung cancer screen:       ROS:    Const: No fevers, weight changes or night sweats recently.  ENT: No runny nose, sore throat or ear pain.  Resp: No cough or shortness of breath.  CV: No chest pain, dizziness or cardiac palpitations.  GI: No nausea, vomiting, diarrhea or constipation. Denies blood in stools or black stools.  : No dysuria, frequency or hematuria.  The rest of the ROS negative, other than listed on HPI    SH:    Marital status: , met new partner.  Kids: 1 daughter  Employment: disabled  Exercise: walking 3 times per week  Tobacco: per HPI  Etoh: no  Recreational drugs: no  Caffeine: no    Exam:    /77 (Cuff Size: Adult Regular)   Pulse 80   Temp 98  F (36.7  C) (Oral)   Ht 1.556 m (5' 1.25\")   Wt 76.2 kg (168 lb)   SpO2 98%   BMI 31.48 kg/m      Gen: 58 year old female in no acute distress. There is psychomotor retardation, baseline.  Neck: No enlarged lymph nodes, thyromegally or other masses.  Lungs: Good air movement and otherwise clear.  CV: Heart RRR with no murmurs. No JVD, carotid bruits or leg edema.  Foot exam: Both feet appear normal by inspection. No calluses or other lesions. DP pluses are normal. The tips of the toes are warm with good capillary refill. Sensation intact per monofilament but she found this to be painful.    Assessment and Plan - Decision Making    1. Peripheral polyneuropathy    Per HPI    - Vitamin B12  - gabapentin (NEURONTIN) 100 MG capsule; Take 1 capsule (100 mg) " by mouth 3 times daily  Dispense: 270 capsule; Refill: 3    2. Sinusitis, unspecified chronicity, unspecified location    Per HPI    - amoxicillin (AMOXIL) 500 MG tablet; Take 2 tablets (1,000 mg) by mouth 2 times daily for 7 days  Dispense: 28 tablet; Refill: 0    3. Screen for STD (sexually transmitted disease)    Per HPI    - Chlamydia trachomatis PCR  - HIV Antigen Antibody Combo  - Neisseria gonorrhoeae PCR  - Treponema Abs w Reflex to RPR and Titer      Written instructions given as follows:    Patient Instructions   1. Amoxicillin as prescribed.    2. Gabapentin as prescribed.    3. I will contact you with results.    4. Stop the Glyburide.    4. See you in April fasting.

## 2019-01-04 NOTE — LETTER
January 7, 2019    Malikcale Coatesaraceli  [address]  [facility] [address]        Dear Skylar,    The results of your recent tests were normal.  Below is a copy of the results.  It was a pleasure to see you at your last appointment.    If you have any questions or concerns, please call myself or my nurse at [phone].    Sincerely,     Toñito Cortez MD/cong      Results for orders placed or performed in visit on 01/04/19   Vitamin B12   Result Value Ref Range    Vitamin B12 347 193 - 986 pg/mL   HIV Antigen Antibody Combo   Result Value Ref Range    HIV Antigen Antibody Combo Nonreactive NR^Nonreactive       Treponema Abs w Reflex to RPR and Titer   Result Value Ref Range    Treponema Antibodies Nonreactive NR^Nonreactive   Chlamydia trachomatis PCR   Result Value Ref Range    Specimen Description Urine     Chlamydia Trachomatis PCR Negative NEG^Negative   Neisseria gonorrhoeae PCR   Result Value Ref Range    Specimen Descrip Urine     N Gonorrhea PCR Negative NEG^Negative

## 2019-01-05 LAB — T PALLIDUM AB SER QL: NONREACTIVE

## 2019-01-06 LAB
C TRACH DNA SPEC QL NAA+PROBE: NEGATIVE
N GONORRHOEA DNA SPEC QL NAA+PROBE: NEGATIVE
SPECIMEN SOURCE: NORMAL
SPECIMEN SOURCE: NORMAL

## 2019-01-07 LAB — HIV 1+2 AB+HIV1 P24 AG SERPL QL IA: NONREACTIVE

## 2019-01-10 ENCOUNTER — TRANSFERRED RECORDS (OUTPATIENT)
Dept: HEALTH INFORMATION MANAGEMENT | Facility: CLINIC | Age: 59
End: 2019-01-10

## 2019-01-16 RX ORDER — AMOXICILLIN 500 MG/1
TABLET, FILM COATED ORAL
Qty: 28 TABLET | Refills: 0 | OUTPATIENT
Start: 2019-01-16

## 2019-01-16 NOTE — TELEPHONE ENCOUNTER
RN please get some history for me.    Did she take the original rx?    Is she asking for another round of abx?    Is she better, worse or the same?    Toñito Cortez M.D.

## 2019-02-04 DIAGNOSIS — M06.9 RHEUMATOID ARTHRITIS, INVOLVING UNSPECIFIED SITE, UNSPECIFIED RHEUMATOID FACTOR PRESENCE: ICD-10-CM

## 2019-02-06 RX ORDER — IBUPROFEN 800 MG/1
TABLET, FILM COATED ORAL
Qty: 90 TABLET | Refills: 1 | Status: SHIPPED | OUTPATIENT
Start: 2019-02-06 | End: 2019-04-19

## 2019-03-06 ENCOUNTER — TELEPHONE (OUTPATIENT)
Dept: FAMILY MEDICINE | Facility: CLINIC | Age: 59
End: 2019-03-06

## 2019-03-06 DIAGNOSIS — F39 MOOD DISORDER (H): Primary | ICD-10-CM

## 2019-03-06 NOTE — TELEPHONE ENCOUNTER
Called and spoke to patient. She is going to see Dr Farhan Bruno in [place] short term.Yamileth Cueva MA/TC

## 2019-03-06 NOTE — TELEPHONE ENCOUNTER
Patient states our behavioral health department told her she needs a referral if she wants to see a psychiatrist and she either wants to go to [facility] or [place].    Thank you.

## 2019-03-08 ENCOUNTER — TELEPHONE (OUTPATIENT)
Dept: FAMILY MEDICINE | Facility: CLINIC | Age: 59
End: 2019-03-08

## 2019-03-08 DIAGNOSIS — F39 MOOD DISORDER (H): Primary | ICD-10-CM

## 2019-03-08 NOTE — TELEPHONE ENCOUNTER
Referral done by Dr. Toñito Cortez on 3/6/19.   Pt states she called and scheduled an appointment and then they called and said since she is looking for long term psychiatrist they can't see her.  She has tried finding another one on her own and can't.  New referral for psychiatrist placed and it has our behavioral health schedulers helping her find provider.  Pt given number to call if she does not hear from them in a few days.  To provider to cosign.  July NAIDUN, RN

## 2019-03-11 ENCOUNTER — TELEPHONE (OUTPATIENT)
Dept: FAMILY MEDICINE | Facility: CLINIC | Age: 59
End: 2019-03-11

## 2019-03-11 NOTE — TELEPHONE ENCOUNTER
Needs office visit. Fit her somewhere.    Ask her to bring old records if possible.    Thanks.    Toñito Cortez M.D.

## 2019-03-11 NOTE — TELEPHONE ENCOUNTER
Reason for Call:  Medication or medication refill:    Do you use a [facility]?  Name of the pharmacy and phone number for the current request:  [facility]    Name of the medication requested: Clonopin 1mg 1 tablet three times a day  Risperidone 3mg 1 tablet twice a day  Fluoxetine 40 mg once per day  Trazodone 100 mg 1-2 at bedtime    Other request: Patient states she has been unable to find a new psychiatrist and needs these refills.     Can we leave a detailed message on this number? YES    Phone number patient can be reached at: Home number on file [phone] (home)    Best Time: any    Call taken on 3/11/2019 at 2:37 PM by Gladis Wilks

## 2019-03-14 ENCOUNTER — TELEPHONE (OUTPATIENT)
Dept: FAMILY MEDICINE | Facility: CLINIC | Age: 59
End: 2019-03-14

## 2019-03-14 DIAGNOSIS — F39 MOOD DISORDER (H): Primary | ICD-10-CM

## 2019-03-14 NOTE — TELEPHONE ENCOUNTER
Notified patient of message below. Patient states will contact previous provider for records and will call back to schedule an appointment/Kimberly Ascencio,

## 2019-03-14 NOTE — TELEPHONE ENCOUNTER
Patient is calling to request short term referral to psychiatry for Kiana xiao. Please call to advise. Thank you.

## 2019-03-15 NOTE — TELEPHONE ENCOUNTER
Order is placed and signed per written orders, please fax to appropriate facility.    Kelsey White RN

## 2019-03-18 NOTE — TELEPHONE ENCOUNTER
Patient called back, she said that she does not need this anymore. She found a long term provider.Yamileth Cueva MA/ASHUTOSH

## 2019-03-27 ENCOUNTER — TRANSFERRED RECORDS (OUTPATIENT)
Dept: HEALTH INFORMATION MANAGEMENT | Facility: CLINIC | Age: 59
End: 2019-03-27

## 2019-04-05 DIAGNOSIS — E11.9 TYPE 2 DIABETES MELLITUS WITHOUT COMPLICATION, WITHOUT LONG-TERM CURRENT USE OF INSULIN (H): Primary | ICD-10-CM

## 2019-04-05 NOTE — LETTER
April 8, 2019    Skylar Bagley  [address]  [place] [address]    Dear Skylar,       We recently received a refill request for metFORMIN (GLUCOPHAGE) 850 MG tablet.  We have refilled this for a one time 30 day supply only because you are due for a:    Diabetes office visit and fasting lab appointment      Please schedule this lab appointment 4-5 days prior to the office visit.     Please call at your earliest convenience so that there will not be a delay with your future refills.          Thank you,   Your [facility] Team/  [phone]

## 2019-04-05 NOTE — TELEPHONE ENCOUNTER
Routing refill request to provider for review/approval because:  Medication is reported/historical. Tamica Wilks RN

## 2019-04-12 ENCOUNTER — TELEPHONE (OUTPATIENT)
Dept: FAMILY MEDICINE | Facility: CLINIC | Age: 59
End: 2019-04-12

## 2019-04-12 NOTE — TELEPHONE ENCOUNTER
Panel Management Review      Patient has the following on her problem list:     Diabetes    ASA: Not Required     Last A1C  Lab Results   Component Value Date    A1C 5.5 10/15/2018    A1C 5.6 04/20/2018    A1C 8.6 09/15/2007     A1C tested: Passed    Last LDL:    Lab Results   Component Value Date    CHOL 130 10/15/2018     Lab Results   Component Value Date    HDL 55 10/15/2018     Lab Results   Component Value Date    LDL 60 10/15/2018     Lab Results   Component Value Date    TRIG 75 10/15/2018     No results found for: CHOLHDLRATIO  Lab Results   Component Value Date    NHDL 75 10/15/2018       Is the patient on a Statin? YES             Is the patient on Aspirin? NO    Medications     HMG CoA Reductase Inhibitors     atorvastatin (LIPITOR) 40 MG tablet             Last three blood pressure readings:  BP Readings from Last 3 Encounters:   01/04/19 122/77   11/23/18 120/74   10/15/18 127/80       Date of last diabetes office visit: 01-     Tobacco History:     History   Smoking Status     Current Every Day Smoker   Smokeless Tobacco     Never Used           Composite cancer screening  Chart review shows that this patient is due/due soon for the following Pap Smear and Mammogram  Summary:    Patient is due/failing the following:   MAMMOGRAM and PAP-patient is a smoker    Action needed:   Patient needs office visit for diabetes.    Type of outreach:    Sent letter. on 4-8-2019    Questions for provider review:    None                                                                                                                                    Courtney Pierre LPN       Chart routed to closed .

## 2019-04-17 ENCOUNTER — TELEPHONE (OUTPATIENT)
Dept: FAMILY MEDICINE | Facility: CLINIC | Age: 59
End: 2019-04-17

## 2019-04-19 DIAGNOSIS — M06.9 RHEUMATOID ARTHRITIS, INVOLVING UNSPECIFIED SITE, UNSPECIFIED RHEUMATOID FACTOR PRESENCE: ICD-10-CM

## 2019-04-22 ENCOUNTER — OFFICE VISIT (OUTPATIENT)
Dept: RHEUMATOLOGY | Facility: CLINIC | Age: 59
End: 2019-04-22
Payer: COMMERCIAL

## 2019-04-22 VITALS
HEIGHT: 61 IN | HEART RATE: 84 BPM | DIASTOLIC BLOOD PRESSURE: 73 MMHG | OXYGEN SATURATION: 97 % | WEIGHT: 157.2 LBS | SYSTOLIC BLOOD PRESSURE: 113 MMHG | BODY MASS INDEX: 29.68 KG/M2

## 2019-04-22 DIAGNOSIS — M79.7 FIBROMYALGIA: ICD-10-CM

## 2019-04-22 DIAGNOSIS — G62.9 PERIPHERAL POLYNEUROPATHY: ICD-10-CM

## 2019-04-22 DIAGNOSIS — Z79.899 HIGH RISK MEDICATION USE: ICD-10-CM

## 2019-04-22 DIAGNOSIS — Z11.59 ENCOUNTER FOR SCREENING FOR OTHER VIRAL DISEASES: ICD-10-CM

## 2019-04-22 DIAGNOSIS — R53.83 FATIGUE, UNSPECIFIED TYPE: ICD-10-CM

## 2019-04-22 DIAGNOSIS — M19.042 PRIMARY OSTEOARTHRITIS OF BOTH HANDS: ICD-10-CM

## 2019-04-22 DIAGNOSIS — M19.041 PRIMARY OSTEOARTHRITIS OF BOTH HANDS: ICD-10-CM

## 2019-04-22 DIAGNOSIS — M06.9 RHEUMATOID ARTHRITIS INVOLVING MULTIPLE SITES, UNSPECIFIED RHEUMATOID FACTOR PRESENCE: Primary | ICD-10-CM

## 2019-04-22 LAB
ALBUMIN SERPL-MCNC: 4.3 G/DL (ref 3.4–5)
ALP SERPL-CCNC: 66 U/L (ref 40–150)
ALT SERPL W P-5'-P-CCNC: 39 U/L (ref 0–50)
AST SERPL W P-5'-P-CCNC: 20 U/L (ref 0–45)
BASOPHILS # BLD AUTO: 0 10E9/L (ref 0–0.2)
BASOPHILS NFR BLD AUTO: 0.3 %
BILIRUB DIRECT SERPL-MCNC: <0.1 MG/DL (ref 0–0.2)
BILIRUB SERPL-MCNC: 0.2 MG/DL (ref 0.2–1.3)
CREAT SERPL-MCNC: 0.86 MG/DL (ref 0.52–1.04)
CRP SERPL-MCNC: <2.9 MG/L (ref 0–8)
DIFFERENTIAL METHOD BLD: NORMAL
EOSINOPHIL # BLD AUTO: 0.2 10E9/L (ref 0–0.7)
EOSINOPHIL NFR BLD AUTO: 3 %
ERYTHROCYTE [DISTWIDTH] IN BLOOD BY AUTOMATED COUNT: 12.8 % (ref 10–15)
ERYTHROCYTE [SEDIMENTATION RATE] IN BLOOD BY WESTERGREN METHOD: 9 MM/H (ref 0–30)
GFR SERPL CREATININE-BSD FRML MDRD: 74 ML/MIN/{1.73_M2}
HCT VFR BLD AUTO: 38.8 % (ref 35–47)
HGB BLD-MCNC: 13.2 G/DL (ref 11.7–15.7)
LYMPHOCYTES # BLD AUTO: 2.8 10E9/L (ref 0.8–5.3)
LYMPHOCYTES NFR BLD AUTO: 40 %
MCH RBC QN AUTO: 31.3 PG (ref 26.5–33)
MCHC RBC AUTO-ENTMCNC: 34 G/DL (ref 31.5–36.5)
MCV RBC AUTO: 92 FL (ref 78–100)
MONOCYTES # BLD AUTO: 0.6 10E9/L (ref 0–1.3)
MONOCYTES NFR BLD AUTO: 8.4 %
NEUTROPHILS # BLD AUTO: 3.3 10E9/L (ref 1.6–8.3)
NEUTROPHILS NFR BLD AUTO: 48.3 %
PLATELET # BLD AUTO: 248 10E9/L (ref 150–450)
PROT SERPL-MCNC: 7.6 G/DL (ref 6.8–8.8)
RBC # BLD AUTO: 4.22 10E12/L (ref 3.8–5.2)
WBC # BLD AUTO: 6.9 10E9/L (ref 4–11)

## 2019-04-22 PROCEDURE — 82565 ASSAY OF CREATININE: CPT | Performed by: INTERNAL MEDICINE

## 2019-04-22 PROCEDURE — 86200 CCP ANTIBODY: CPT | Performed by: INTERNAL MEDICINE

## 2019-04-22 PROCEDURE — 86140 C-REACTIVE PROTEIN: CPT | Performed by: INTERNAL MEDICINE

## 2019-04-22 PROCEDURE — 86618 LYME DISEASE ANTIBODY: CPT | Performed by: INTERNAL MEDICINE

## 2019-04-22 PROCEDURE — 85025 COMPLETE CBC W/AUTO DIFF WBC: CPT | Performed by: INTERNAL MEDICINE

## 2019-04-22 PROCEDURE — 85652 RBC SED RATE AUTOMATED: CPT | Performed by: INTERNAL MEDICINE

## 2019-04-22 PROCEDURE — 86704 HEP B CORE ANTIBODY TOTAL: CPT | Performed by: INTERNAL MEDICINE

## 2019-04-22 PROCEDURE — 86481 TB AG RESPONSE T-CELL SUSP: CPT | Performed by: INTERNAL MEDICINE

## 2019-04-22 PROCEDURE — G0499 HEPB SCREEN HIGH RISK INDIV: HCPCS | Performed by: INTERNAL MEDICINE

## 2019-04-22 PROCEDURE — G0472 HEP C SCREEN HIGH RISK/OTHER: HCPCS | Performed by: INTERNAL MEDICINE

## 2019-04-22 PROCEDURE — 80076 HEPATIC FUNCTION PANEL: CPT | Performed by: INTERNAL MEDICINE

## 2019-04-22 PROCEDURE — 99204 OFFICE O/P NEW MOD 45 MIN: CPT | Performed by: INTERNAL MEDICINE

## 2019-04-22 PROCEDURE — 86431 RHEUMATOID FACTOR QUANT: CPT | Performed by: INTERNAL MEDICINE

## 2019-04-22 PROCEDURE — 36415 COLL VENOUS BLD VENIPUNCTURE: CPT | Performed by: INTERNAL MEDICINE

## 2019-04-22 RX ORDER — LEFLUNOMIDE 20 MG/1
20 TABLET ORAL DAILY
Qty: 30 TABLET | Refills: 3 | Status: SHIPPED | OUTPATIENT
Start: 2019-04-22 | End: 2019-06-06

## 2019-04-22 RX ORDER — GABAPENTIN 300 MG/1
300 CAPSULE ORAL 3 TIMES DAILY
Qty: 270 CAPSULE | Refills: 0 | Status: SHIPPED | OUTPATIENT
Start: 2019-04-22 | End: 2019-05-15 | Stop reason: DRUGHIGH

## 2019-04-22 RX ORDER — IBUPROFEN 800 MG/1
TABLET, FILM COATED ORAL
Qty: 90 TABLET | Refills: 1 | Status: SHIPPED | OUTPATIENT
Start: 2019-04-22 | End: 2019-06-28

## 2019-04-22 ASSESSMENT — MIFFLIN-ST. JEOR: SCORE: 1229.39

## 2019-04-22 NOTE — PATIENT INSTRUCTIONS
Gabapentin   For 3 days: 100mg in the AM, 100mg at noon, 300mg in the evening   Then for the next 3 days: 100mg in the AM, 300mg at noon, 300mg in the evening     Then 300mg 3x/day thereafter    Start leflunomide 20mg daily    Rheumatology    Dr. Quinten Wright         [facility]   (Monday)  [address]  [place] [address]       [facility]   (Tuesday)  [address] [place] [address]    [facility]   (Wed., Thurs., and Friday)  [address] [place] [address]    Phone number: [phone]  Thank you for choosing [facility].  Sheila Hough CMA

## 2019-04-22 NOTE — PROGRESS NOTES
Rheumatology Clinic Visit      Skylar Bagley MRN# [id number]   YOB: 1960 Age: 59 year old      Date of visit: 4/22/19   Referring provider: Dr. Toñito Cortez  PCP: Dr. Toñito Cortez    Chief Complaint   Patient presents with:  Consult: Right hip and both knees. Fingers are starting to turn. Hard time doing chores around the house and to get dress.      Assessment and Plan     1.  Rheumatoid arthritis: Reportedly diagnosed more than 25 years ago.  Establishing care with me today, 4/22/2019.  Previously treated with methotrexate (LFT elevation), hydroxychloroquine (retinal toxicity).  Currently on Enbrel 50 mg SQ weekly and ibuprofen 800 mg twice daily.  She has been on the Enbrel/ibuprofen regimen for 6-8 years per patient. Active synovitis today.  Discussed tx options.  Start leflunomide. If infection screening labs are okay then will refills Enbrel.  Advised trying to cut down ibuprofen use if the leflunomide becomes effective.  - Continue Enbrel 50mg SQ every 7 days if labs/xrays are okay  - Start leflunomide 20mg daily  - Ibuprofen 800mg BID PRN   - X-rays today: chest, bilateral hands/feet  - Labs: CBC, creatinine, hepatic panel, hepatitis B/C, CCP, RF, ESR, CRP, quantiferon TB gold plus  - Labs monthly [id number]: CBC, Cr, Hepatic Panel    # Etanercept (Enbrel) Risks and Benefits: The risks and benefits of etanercept were discussed in detail and the patient verbalized understanding.  The risks discussed include, but are not limited to, the risk for hypersensitivity, anaphylaxis, anaphylactoid reactions, an increased risk for serious infections leading to hospitalization or death, a possible increased risk for lymphoma and other malignancies, a possible worsening of demyelinating diseases, a possible worsening of heart failure, possible reactivation of hepatitis B, and possible reactivation of latent tuberculosis.  Subcutaneous injections may result in injection site reactions and/or pain at  the site of injection.  It was discussed that the medication would need to be discontinued if a serious infection develops.  It was discussed that live vaccinations should not be received while using etanercept or within 30 days prior to starting etanercept.  I encouraged reviewing the package insert and asking any questions about the medication.      # Leflunomide (Arava) Risks and Benefits: The risks and benefits of leflunomide were discussed in detail and the patient verbalized understanding.  The risks discussed include, but are not limited to, the risk for hypersensitivity, anaphylaxis, anaphylactoid reactions, hepatotoxicity, infections, interstitial lung disease, alopecia, rash, nausea, and diarrhea.  The impact on malignancies is not fully defined.   Alcohol should be avoided while taking leflunomide.  Pregnancy prevention and planning was discussed; it is recommended that women of childbearing potential use reliable contraception before, during, and for a period of 2 years after treatment with leflunomide; if pregnancy is planned within 2 years of discontinuing medication then women should undergo drug elimination procedures (i.e. cholestyramine). Routine laboratory monitoring is required during leflunomide therapy. I encouraged reviewing the package insert and asking any questions about the medication.      # NSAIDs:  The risks and benefits of NSAIDs were discussed in detail and the patient verbalized understanding.  The risks discussed include, but are not limited to, the risk for hypersensitivity, anaphylaxis, GI upset, gastric ulcer, nephropathy, and an increased risk for cardiovascular events.     2. Fibromyalgia: We reviewed the diagnosis and treatment options.  She was recently started on gabapentin 100 mg 3 times daily by Dr. Cortez for peripheral neuropathy with improvement of her whole body pain.  She still has significant whole body pain though.  Since she got benefit from gabapentin I have  advised her to increase gabapentin to 300 mg 3 times daily.  Follow-up with her primary care provider for further management of fibromyalgia.  She may also consider being seen in the pain clinic; she is going to discuss with Dr. Cortez.     3.  Nonrestorative sleep: Using trazodone to help her sleep.  She snores, wakes up tired, and sleeps for 8-10 hours per night.  Refer to a sleep specialist for evaluation  - Sleep eval referral    4. Primary osteoarthritis of both hands: evidenced by Heberden's and Yasmin's nodes.  We reviewed the diagnosis and natural progression of osteoarthritis    5. Cigarette Smoking:  Encouraged complete cessation and discussed the health benefits.      Ms. Bagley verbalized agreement with and understanding of the rational for the diagnosis and treatment plan.  All questions were answered to best of my ability and the patient's satisfaction. Ms. Bagley was advised to contact the clinic with any questions that may arise after the clinic visit.      Thank you for involving me in the care of the patient    Return to clinic: 3 months      HPI   Skylar Bagley is a 59 year old female with a past medical history significant for diabetes, GERD, hypothyroidism, hyperlipidemia, anxiety, depression, and RA who is seen in consultation at the request of Dr. Toñito Cortez for evaluation of rheumatoid arthritis.    Today, Ms. Bagley reports being dx'd with RA 25 years ago.  Joints involved: shoulders, hips, fingers, toes.   Previously tx'd with HCQ (retinal toxicity), MTX (LFT elevations).  Currently on Enbrel 50mg SQ weekly; and ibuprofen 800mg BID; stable regimen for 6-8 years.  Currently with bilateral knee pain and left hip pain. Hard to do daily chores because of these joints hurting, and pain/sweakness of her hands.  Has had to use 2 hands to  a coffee cup for the last 6-7 years.  Hires house  because because of her hand pain/stiffness.  Morning stiffness lasts for  about 1-2 hours.  Pain usually improves with activity but sometimes worsens with chronic constipation but no diarrhea.  She was placed on gabapentin for neuropathy and it has helped quite a bit with her diffuse body pain; she is currently taking gabapentin 100 mg 3 times daily.  She sleeps for about 10 hours at night, wakes up tired, does not nap during the day; uses trazodone; does snore.  It does not wake up at night.    Denies fevers, chills, nausea, or vomiting.  Chronic constipation.  No diarrhea.   No abdominal pain. No chest pain/pressure, palpitations, or shortness of breath. No LE swelling. No neck pain. No oral or nasal sores.  No rash. No sicca symptoms. No photosensitivity or photophobia. No eye pain or redness. No history of inflammatory eye disease.  No history of inflammatory bowel disease.  No history of DVT, pulmonary embolism, or miscarriage.   No history of serositis.        Tobacco: Cigarette smoking  EtOH: None  Drugs: None  Occupation:  Reportedly on disability because of rheumatoid arthritis    ROS   GEN: No fevers, chills, night sweats, or weight change  SKIN: No itching, rashes, sores  HEENT: No epistaxis. No oral or nasal ulcers.  CV: No chest pain, pressure, palpitations, or dyspnea on exertion.  PULM: No SOB, wheeze, cough.  GI: See HPI.  No blood in stool  : No blood in urine.  MSK: See HPI.  NEURO: No numbness or tingling  ENDO: No heat/cold intolerance.   EXT: No LE swelling  PSYCH: See HPI    Active Problem List     Patient Active Problem List   Diagnosis     Type 2 diabetes mellitus without complication, without long-term current use of insulin (H)     Dyslipidemia     Class 1 obesity with serious comorbidity and body mass index (BMI) of 31.0 to 31.9 in adult, unspecified obesity type     Hypothyroidism, unspecified type     Gastroesophageal reflux disease, esophagitis presence not specified     Rheumatoid arthritis, involving unspecified site, unspecified rheumatoid factor  presence (H)     Past Medical History     Past Medical History:   Diagnosis Date     Arthritis      Depressive disorder      Diabetes (H)      Thyroid disease      Past Surgical History     Past Surgical History:   Procedure Laterality Date     VAG DELIV ONLY,PREV C-SECTN       Allergy     Allergies   Allergen Reactions     Asa [Aspirin]      Current Medication List     Current Outpatient Medications   Medication Sig     atorvastatin (LIPITOR) 40 MG tablet Take 1 tablet (40 mg) by mouth daily     calcium carbonate 500 mg-vitamin D 200 units (OSCAL WITH D;OYSTER SHELL CALCIUM) 500-200 MG-UNIT per tablet Take 1 tablet by mouth     clonazePAM (KLONOPIN) 2 MG tablet 1 tablet AM and 2 tablets QHS     etanercept (ENBREL) 50 MG/ML injection Inject 50 mg Subcutaneous once a week     FLUoxetine (PROZAC) 40 MG capsule Take 40 mg by mouth     fluticasone (FLONASE) 50 MCG/ACT spray Spray 1 spray in nostril     gabapentin (NEURONTIN) 300 MG capsule Take 1 capsule (300 mg) by mouth 3 times daily     ibuprofen (ADVIL/MOTRIN) 800 MG tablet TAKE 1 TABLET TWICE DAILY AS NEEDED FOR  MODERATE  PAIN     leflunomide (ARAVA) 20 MG tablet Take 1 tablet (20 mg) by mouth daily     levothyroxine (SYNTHROID/LEVOTHROID) 100 MCG tablet Take 1 tablet (100 mcg) by mouth daily     metFORMIN (GLUCOPHAGE) 850 MG tablet Take 1 tablet (850 mg) by mouth 2 times daily (with meals)     risperiDONE (RISPERDAL) 3 MG tablet Take 3 mg by mouth     traZODone (DESYREL) 100 MG tablet Take 100 mg by mouth     ACE/ARB/ARNI NOT PRESCRIBED, INTENTIONAL, Please choose reason not prescribed, below     ASPIRIN NOT PRESCRIBED (INTENTIONAL) Please choose reason not prescribed, below     blood glucose monitoring (CONTOUR NEXT TEST) test strip TEST 4 TIMES A DAY     Lancets 30G MISC Test 4 times per day. ICD-10 code E11.9     omeprazole (PRILOSEC) 40 MG capsule      UNABLE TO FIND remicade     UNABLE TO FIND resperidone     No current facility-administered medications for  "this visit.          Social History   See HPI    Family History   History reviewed. No pertinent family history.      Physical Exam     Temp Readings from Last 3 Encounters:   01/04/19 98  F (36.7  C) (Oral)   11/23/18 98  F (36.7  C) (Oral)   10/15/18 97.3  F (36.3  C) (Oral)     BP Readings from Last 5 Encounters:   04/22/19 113/73   01/04/19 122/77   11/23/18 120/74   10/15/18 127/80     Pulse Readings from Last 1 Encounters:   04/22/19 84     Resp Readings from Last 1 Encounters:   No data found for Resp     Estimated body mass index is 29.46 kg/m  as calculated from the following:    Height as of this encounter: 1.556 m (5' 1.25\").    Weight as of this encounter: 71.3 kg (157 lb 3.2 oz).    GEN: NAD  HEENT: MMM. No oral lesions. Anicteric, noninjected sclera  CV: S1, S2. RRR. No m/r/g.  PULM: CTA bilaterally. No w/c.  ABD: +BS.    MSK: Synovial swelling and tenderness palpation of the bilateral second-fourth MCPs.  PIPs and DIPs without swelling but were diffusely tender to palpation.  Heberden's and Yasmin's nodes present.  Wrists, elbows, shoulders, knees, ankles, and MTPs diffusely tender to palpation but without swelling, increased warmth, or overlying erythema.  Hips tender to palpation bilaterally.  All fibromyalgia tender points positive.     NEURO: UE and LE strengths 5/5 and equal bilaterally.   SKIN: No rash.  No nail pitting.  EXT: No LE edema  PSYCH: Alert. Appropriate.    Labs / Imaging (select studies)     CBC  Recent Labs   Lab Test 01/04/19  1419 10/15/18  1407   WBC 9.0 8.1   RBC 4.36 4.12   HGB 13.3 12.8   HCT 40.0 38.0   MCV 92 92   RDW 12.9 12.8    260   MCH 30.5 31.1   MCHC 33.3 33.7   NEUTROPHIL  --  57.8   LYMPH  --  32.8   MONOCYTE  --  7.5   EOSINOPHIL  --  1.5   BASOPHIL  --  0.4   ANEU  --  4.7   ALYM  --  2.7   FARHAD  --  0.6   AEOS  --  0.1   ABAS  --  0.0     CMP  Recent Labs   Lab Test 01/04/19  1419 10/15/18  1446 10/15/18  1407   NA  --   --  141   POTASSIUM  --   --  " 4.0   CHLORIDE  --   --  111*   CO2  --   --  22   ANIONGAP  --   --  8   GLC  --   --  71   BUN  --   --  15   CR 0.98  --  0.96   GFRESTIMATED 63  --  59*   GFRESTBLACK 73  --  72   MELANIE  --   --  9.2   BILITOTAL  --   --  0.2   ALBUMIN 4.5 4.4 4.3   PROTTOTAL  --   --  7.8   ALKPHOS  --   --  58   AST 15  --  21   ALT 30  --  35     HgA1c  Recent Labs   Lab Test 10/15/18  1407 04/20/18   A1C 5.5 5.6     Calcium/VitaminD  Recent Labs   Lab Test 10/15/18  1407   MELANIE 9.2     TSH/T4  Recent Labs   Lab Test 10/15/18  1407 04/20/18   TSH 0.08* 0.45   T4 1.21  --      HIV Screening  Recent Labs   Lab Test 01/04/19  1416   HIAGAB Nonreactive     Immunization History     Immunization History   Administered Date(s) Administered     Influenza Quad, Recombinant, p-free (RIV4) 10/15/2018     Influenza Vaccine IM 3yrs+ 4 Valent IIV4 10/07/2016, 10/18/2017     Pneumo Conj 13-V (2010&after) 10/15/2018     Pneumococcal 23 valent 04/19/2016     TD (ADULT, 7+) 09/13/2016          Chart documentation done in part with Dragon Voice recognition Software. Although reviewed after completion, some word and grammatical error may remain.    Quinten Wright MD

## 2019-04-22 NOTE — Clinical Note
Joel Cortez:For RA I started leflunomide.  For nonrestorative sleep I referred to sleep specialist.  For fibromyalgia I increased gabapentin since her dose was helping already; she is going to follow-up with you for fibromyalgia management - I said that you might refer her to the pain management clinic.Thanks!Quinten

## 2019-04-23 LAB
B BURGDOR IGG+IGM SER QL: 0.09 (ref 0–0.89)
CCP AB SER IA-ACNC: >340 U/ML
HBV CORE AB SERPL QL IA: REACTIVE
HBV SURFACE AB SERPL IA-ACNC: 49.89 M[IU]/ML
HBV SURFACE AG SERPL QL IA: NONREACTIVE
HCV AB SERPL QL IA: NONREACTIVE
RHEUMATOID FACT SER NEPH-ACNC: 216 IU/ML (ref 0–20)

## 2019-04-24 LAB
GAMMA INTERFERON BACKGROUND BLD IA-ACNC: 0.24 IU/ML
M TB IFN-G BLD-IMP: NEGATIVE
M TB IFN-G CD4+ BCKGRND COR BLD-ACNC: 7.9 IU/ML
MITOGEN IGNF BCKGRD COR BLD-ACNC: 0.06 IU/ML
MITOGEN IGNF BCKGRD COR BLD-ACNC: 0.11 IU/ML

## 2019-04-25 ENCOUNTER — TELEPHONE (OUTPATIENT)
Dept: RHEUMATOLOGY | Facility: CLINIC | Age: 59
End: 2019-04-25

## 2019-04-25 DIAGNOSIS — R76.8 HEPATITIS B CORE ANTIBODY POSITIVE: ICD-10-CM

## 2019-04-25 DIAGNOSIS — M05.9 SEROPOSITIVE RHEUMATOID ARTHRITIS (H): Primary | ICD-10-CM

## 2019-04-25 NOTE — TELEPHONE ENCOUNTER
Prior Authorization Approval    Authorization Effective Date: 4/25/2019  Authorization Expiration Date: 4/24/2021  Medication: enbrel pa approved  Approved Dose/Quantity: 4-28ds  Reference #: [id number]   Insurance Company: [employer] [phone] Fax [phone]  Expected CoPay: na     CoPay Card Available: No    Foundation Assistance Needed: available if needed  Which Pharmacy is filling the prescription (Not needed for infusion/clinic administered): [facility] - [place] [address]   Pharmacy Notified: Yes  Patient Notified: Yes        PA Initiation    Medication: enbrel pa pending   Insurance Company: [employer] Phone [phone] Fax [phone]  Pharmacy Filling the Rx:    Filling Pharmacy Phone:    Filling Pharmacy Fax:    Start Date: 4/25/2019

## 2019-04-25 NOTE — TELEPHONE ENCOUNTER
Contacted Pt, reviewed lab work.  Pt had no questions or concerns, agrees and understands.    Annetta Lundy CMA  4/25/2019  9:48 AM

## 2019-04-25 NOTE — RESULT ENCOUNTER NOTE
Rheumatology team: Please call to notify Ms. Bagley that the tuberculosis test was negative.  Enbrel has been refilled.  Hepatitis B core antibody was positive, as well as the hepatitis B surface antibody; this suggests that there was exposure to hepatitis B but likely has cleared.  An additional lab for hepatitis B is needed though to check for presence of the actual virus; I have added this lab to be done at her convenience, preferably within the next 1 month.  Antibodies for CCP and rheumatoid factor were positive, supporting the diagnosis of rheumatoid arthritis.    Quinten Wright MD  4/25/2019 7:27 AM

## 2019-04-29 ENCOUNTER — TELEPHONE (OUTPATIENT)
Dept: FAMILY MEDICINE | Facility: CLINIC | Age: 59
End: 2019-04-29

## 2019-04-29 NOTE — TELEPHONE ENCOUNTER
Controlled Substance Refill Request for clonazepam  Problem List Complete:  No       Last Office Visit with [facility] primary care provider: 1/4/19    Future Office visit:   Next 5 appointments (look out 90 days)    May 15, 2019  1:00 PM CDT  Office Visit with Toñito Cortez MD  [facility] ([facility] [address] [facility] [phone]          Controlled substance agreement:   Encounter-Level CSA:    There are no encounter-level csa.     Patient-Level CSA:    There are no patient-level csa.         Last Urine Drug Screen: No results found for: CDAUT, No results found for: COMDAT, No results found for: THC13, PCP13, COC13, MAMP13, OPI13, AMP13, BZO13, TCA13, MTD13, BAR13, OXY13, PPX13, BUP13     Processing:  Fax Rx to [facility] pharmacy     [email or web address]       checked in past 3 months?  Yes 4/29/19  on your desk for review.   July NAIDUN, RN

## 2019-04-29 NOTE — TELEPHONE ENCOUNTER
Patient is requesting a refill for her medication that she get from psychiatrist. Patient has found a new one but unable to get in until May 21 would like pcp to refill her clonazepam       Please call to advice  Thank you

## 2019-04-30 NOTE — TELEPHONE ENCOUNTER
Called and informed patient of Dr Cortez's message. She already has an appointment set up for 5/15/19.Yamileth Cueva MA/ASHUTOSH

## 2019-05-02 DIAGNOSIS — E11.9 TYPE 2 DIABETES MELLITUS WITHOUT COMPLICATION, WITHOUT LONG-TERM CURRENT USE OF INSULIN (H): ICD-10-CM

## 2019-05-07 ENCOUNTER — TELEPHONE (OUTPATIENT)
Dept: RHEUMATOLOGY | Facility: CLINIC | Age: 59
End: 2019-05-07

## 2019-05-07 NOTE — TELEPHONE ENCOUNTER
Patient wants to know if she can get her lab work done on the same time as her primary care appt on 5/15/19.  I did explain to her she needs an appt.  Please call.

## 2019-05-08 NOTE — TELEPHONE ENCOUNTER
Contacted Pt, and explained that a lab appointment was already made for 05/15/19 @ 2 PM doing both Dr Cortez and Dr Wright labs.  Pt had no questions or concerns, agrees and understands.      Annetta Lundy CMA  5/8/2019  8:21 AM

## 2019-05-11 ENCOUNTER — DOCUMENTATION ONLY (OUTPATIENT)
Dept: LAB | Facility: CLINIC | Age: 59
End: 2019-05-11

## 2019-05-11 DIAGNOSIS — E03.9 HYPOTHYROIDISM, UNSPECIFIED TYPE: ICD-10-CM

## 2019-05-11 DIAGNOSIS — E11.9 TYPE 2 DIABETES MELLITUS WITHOUT COMPLICATION, WITHOUT LONG-TERM CURRENT USE OF INSULIN (H): Primary | ICD-10-CM

## 2019-05-11 NOTE — PROGRESS NOTES
Patient has an up coming lab appointment on 5.15.2019. Per Health Maintenance patient is due for A1C and I have pended the order. Please review, sign, and place any additional future orders that may be needed.     Thank you,   Daphnie Haddad MLT ([facility])

## 2019-05-13 NOTE — PROGRESS NOTES
Please review lab orders sign and close encounter. Yamileth Cueva MA/ASHUTOSH    Med check appt 5/15/19

## 2019-05-15 ENCOUNTER — OFFICE VISIT (OUTPATIENT)
Dept: FAMILY MEDICINE | Facility: CLINIC | Age: 59
End: 2019-05-15
Payer: COMMERCIAL

## 2019-05-15 VITALS
TEMPERATURE: 98.9 F | OXYGEN SATURATION: 98 % | SYSTOLIC BLOOD PRESSURE: 116 MMHG | DIASTOLIC BLOOD PRESSURE: 74 MMHG | WEIGHT: 159 LBS | BODY MASS INDEX: 29.8 KG/M2 | HEART RATE: 83 BPM

## 2019-05-15 DIAGNOSIS — R76.8 HEPATITIS B CORE ANTIBODY POSITIVE: ICD-10-CM

## 2019-05-15 DIAGNOSIS — Z79.899 HIGH RISK MEDICATION USE: ICD-10-CM

## 2019-05-15 DIAGNOSIS — M06.9 RHEUMATOID ARTHRITIS INVOLVING MULTIPLE SITES, UNSPECIFIED RHEUMATOID FACTOR PRESENCE: ICD-10-CM

## 2019-05-15 DIAGNOSIS — F39 MOOD DISORDER (H): ICD-10-CM

## 2019-05-15 DIAGNOSIS — E03.9 HYPOTHYROIDISM, UNSPECIFIED TYPE: ICD-10-CM

## 2019-05-15 DIAGNOSIS — G62.9 PERIPHERAL POLYNEUROPATHY: Primary | ICD-10-CM

## 2019-05-15 DIAGNOSIS — E11.9 TYPE 2 DIABETES MELLITUS WITHOUT COMPLICATION, WITHOUT LONG-TERM CURRENT USE OF INSULIN (H): ICD-10-CM

## 2019-05-15 LAB
ALBUMIN SERPL-MCNC: 4.5 G/DL (ref 3.4–5)
ALP SERPL-CCNC: 61 U/L (ref 40–150)
ALT SERPL W P-5'-P-CCNC: 35 U/L (ref 0–50)
AST SERPL W P-5'-P-CCNC: 17 U/L (ref 0–45)
BASOPHILS # BLD AUTO: 0 10E9/L (ref 0–0.2)
BASOPHILS NFR BLD AUTO: 0.5 %
BILIRUB DIRECT SERPL-MCNC: 0.1 MG/DL (ref 0–0.2)
BILIRUB SERPL-MCNC: 0.3 MG/DL (ref 0.2–1.3)
CREAT SERPL-MCNC: 0.87 MG/DL (ref 0.52–1.04)
DIFFERENTIAL METHOD BLD: NORMAL
EOSINOPHIL # BLD AUTO: 0.2 10E9/L (ref 0–0.7)
EOSINOPHIL NFR BLD AUTO: 1.9 %
ERYTHROCYTE [DISTWIDTH] IN BLOOD BY AUTOMATED COUNT: 12.6 % (ref 10–15)
GFR SERPL CREATININE-BSD FRML MDRD: 73 ML/MIN/{1.73_M2}
HBA1C MFR BLD: 5.8 % (ref 0–5.6)
HCT VFR BLD AUTO: 39.5 % (ref 35–47)
HGB BLD-MCNC: 13.4 G/DL (ref 11.7–15.7)
LYMPHOCYTES # BLD AUTO: 2.9 10E9/L (ref 0.8–5.3)
LYMPHOCYTES NFR BLD AUTO: 37.2 %
MCH RBC QN AUTO: 31.2 PG (ref 26.5–33)
MCHC RBC AUTO-ENTMCNC: 33.9 G/DL (ref 31.5–36.5)
MCV RBC AUTO: 92 FL (ref 78–100)
MONOCYTES # BLD AUTO: 0.8 10E9/L (ref 0–1.3)
MONOCYTES NFR BLD AUTO: 9.8 %
NEUTROPHILS # BLD AUTO: 3.9 10E9/L (ref 1.6–8.3)
NEUTROPHILS NFR BLD AUTO: 50.6 %
PLATELET # BLD AUTO: 251 10E9/L (ref 150–450)
PROT SERPL-MCNC: 8.1 G/DL (ref 6.8–8.8)
RBC # BLD AUTO: 4.29 10E12/L (ref 3.8–5.2)
T4 FREE SERPL-MCNC: 1.09 NG/DL (ref 0.76–1.46)
TSH SERPL DL<=0.005 MIU/L-ACNC: 0.24 MU/L (ref 0.4–4)
WBC # BLD AUTO: 7.8 10E9/L (ref 4–11)

## 2019-05-15 PROCEDURE — 84443 ASSAY THYROID STIM HORMONE: CPT | Performed by: FAMILY MEDICINE

## 2019-05-15 PROCEDURE — 83036 HEMOGLOBIN GLYCOSYLATED A1C: CPT | Performed by: FAMILY MEDICINE

## 2019-05-15 PROCEDURE — 87517 HEPATITIS B DNA QUANT: CPT | Performed by: INTERNAL MEDICINE

## 2019-05-15 PROCEDURE — 99214 OFFICE O/P EST MOD 30 MIN: CPT | Performed by: FAMILY MEDICINE

## 2019-05-15 PROCEDURE — 85025 COMPLETE CBC W/AUTO DIFF WBC: CPT | Performed by: INTERNAL MEDICINE

## 2019-05-15 PROCEDURE — 82565 ASSAY OF CREATININE: CPT | Performed by: INTERNAL MEDICINE

## 2019-05-15 PROCEDURE — 84439 ASSAY OF FREE THYROXINE: CPT | Performed by: FAMILY MEDICINE

## 2019-05-15 PROCEDURE — 80076 HEPATIC FUNCTION PANEL: CPT | Performed by: INTERNAL MEDICINE

## 2019-05-15 PROCEDURE — 36415 COLL VENOUS BLD VENIPUNCTURE: CPT | Performed by: FAMILY MEDICINE

## 2019-05-15 RX ORDER — CLONAZEPAM 1 MG/1
1 TABLET ORAL 3 TIMES DAILY PRN
Qty: 90 TABLET | Refills: 0 | Status: SHIPPED | OUTPATIENT
Start: 2019-05-15 | End: 2019-07-01

## 2019-05-15 RX ORDER — FLUOXETINE 40 MG/1
40 CAPSULE ORAL DAILY
Qty: 90 CAPSULE | Refills: 0 | Status: SHIPPED | OUTPATIENT
Start: 2019-05-15 | End: 2019-07-22

## 2019-05-15 RX ORDER — TRAZODONE HYDROCHLORIDE 100 MG/1
200 TABLET ORAL
Qty: 180 TABLET | Refills: 0 | Status: SHIPPED | OUTPATIENT
Start: 2019-05-15 | End: 2019-07-04

## 2019-05-15 RX ORDER — GABAPENTIN 100 MG/1
100 CAPSULE ORAL 3 TIMES DAILY
Qty: 270 CAPSULE | Refills: 3 | Status: SHIPPED | OUTPATIENT
Start: 2019-05-15 | End: 2020-02-05 | Stop reason: DRUGHIGH

## 2019-05-15 NOTE — LETTER
[address] [place] [address]    May 16, 2019    Skylar Bagley  [address]  [facility] [place] [address]          Dear Skylar,    Your labs are normal.     Please let me know if you have any questions    Enclosed is a copy of your results.     Results for orders placed or performed in visit on 05/15/19   Hepatic panel   Result Value Ref Range    Bilirubin Direct 0.1 0.0 - 0.2 mg/dL    Bilirubin Total 0.3 0.2 - 1.3 mg/dL    Albumin 4.5 3.4 - 5.0 g/dL    Protein Total 8.1 6.8 - 8.8 g/dL    Alkaline Phosphatase 61 40 - 150 U/L    ALT 35 0 - 50 U/L    AST 17 0 - 45 U/L   Creatinine   Result Value Ref Range    Creatinine 0.87 0.52 - 1.04 mg/dL    GFR Estimate 73 >60 mL/min/[1.73_m2]    GFR Estimate If Black 84 >60 mL/min/[1.73_m2]   CBC with platelets differential   Result Value Ref Range    WBC 7.8 4.0 - 11.0 10e9/L    RBC Count 4.29 3.8 - 5.2 10e12/L    Hemoglobin 13.4 11.7 - 15.7 g/dL    Hematocrit 39.5 35.0 - 47.0 %    MCV 92 78 - 100 fl    MCH 31.2 26.5 - 33.0 pg    MCHC 33.9 31.5 - 36.5 g/dL    RDW 12.6 10.0 - 15.0 %    Platelet Count 251 150 - 450 10e9/L    % Neutrophils 50.6 %    % Lymphocytes 37.2 %    % Monocytes 9.8 %    % Eosinophils 1.9 %    % Basophils 0.5 %    Absolute Neutrophil 3.9 1.6 - 8.3 10e9/L    Absolute Lymphocytes 2.9 0.8 - 5.3 10e9/L    Absolute Monocytes 0.8 0.0 - 1.3 10e9/L    Absolute Eosinophils 0.2 0.0 - 0.7 10e9/L    Absolute Basophils 0.0 0.0 - 0.2 10e9/L    Diff Method Automated Method        If you have any questions or concerns, please call myself or my nurse at [phone].      Sincerely,      Quinten Wright MD/perea

## 2019-05-15 NOTE — PROGRESS NOTES
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 Slow moving, slow talking. But cognitively just fine.  Evaluation and treatment:    This is usually what I would see with parkinson's.   But I think hers is on the basis of her psychiatric issues and medications used to treat them.    Peripheral neuropathy - since around Oct 2018 she has had \"pins and needles\" in both feet. Sometimes comes up the legs. No focal weakness.   Evaluation and treatment:   B12 level normal at 347 today 1/4/18.   I think this may be related to her diabetes.   Gabapentin 300 mg tid - helps - wants 100 mg tid which I approved.    Previous GERD - she used to get heart burn a lot - mostly epigastric pain without any chest or throat burning.   Evaluation and treatment:    When she stopped her Plaquenil, her pain resolved.     RA -   Evaluation and treatment:    Followed with rheumatologist. Macy Felipe, [facility] - Phone [phone]  Fax [phone].   She is now following with our rheumatologist.    Type 2 diabetes - dx'd around around age 55. Checks glucose about 3 times per day. Results are around , average under 150. Denies symptoms of high or low glucose.   Evaluation and treatment:    Eye exam - per patient fine on 10/16/18.    Foot exam - fine 10/15/18.   Urine albumin - negative 10/15/18.   ASA - allergic - hives   Metformin 850 mg bid - no side effects.   Glyburide stopped due to hypoglycemia.   Counseled about the various complications of diabetes, weight loss via diet, exercise, checking glucose levels, self foot check and managing hypoglycemia.    Lab Results   Component Value Date    A1C 5.8 05/15/2019    A1C 5.5 10/15/2018    A1C 5.6 04/20/2018    A1C 8.6 09/15/2007       Obesity -   Evaluation and treatment:    Diet and exercise discussed.    Body mass index is 29.8 kg/m .  Wt Readings from Last 5 Encounters:   05/15/19 72.1 kg " (159 lb)   04/22/19 71.3 kg (157 lb 3.2 oz)   01/04/19 76.2 kg (168 lb)   11/23/18 77.1 kg (170 lb)   10/15/18 76.3 kg (168 lb 3.2 oz)       Dyslipidemia - no h/o vascular disease like CAD, PAD, CVA but has h/o diabetes.   Evaluation and treatment:    Per [facility], moderate to high instensity statin recommended.   Counseling done today regarding healthy weight, diet and exercise.    Lipitor 40 mg every day - no side effects.   Continue same tx.       Recent Labs   Lab Test  10/15/18   1407 04/20/18   CHOL  130  228*   HDL  55  61   LDL  60  118   TRIG  75  245*       Hypothyroidism - denies thyroid type symptoms.  Evaluation and treatment:    Synthroid 100 mcg daily (reduced from 112 mcg) - no side effects.   We will continue with same dose for now.     TSH   Date Value Ref Range Status   05/15/2019 0.24 (L) 0.40 - 4.00 mU/L Final     T4 Free   Date Value Ref Range Status   05/15/2019 1.09 0.76 - 1.46 ng/dL Final       Depression, anxiety, panic, psychosis -   Evaluation and treatment:    followed with psychiatry but now she is switching - upcoming appointment assessment 5/31/19.    Klonopin 1 mg tid - I refilled short term with warnings.   Trazodone 200 mg at bedtime - I refilled short term.   Prozac 40 mg daily - I refilled short term.   This needs to be managed by psychiatry.    Tobacco abuse - started age 18, average 1 ppd, quit total of 18 years, quit and restarted. Quit again Jan 2019. This gives her about 24 pack year.  Evaluation and treatment:    I commended her for quitting.   Does not qualify for lung CA screen.    Preventive - I advised to check with her rheumatologist about Shingrix vaccine.     Immunization History   Administered Date(s) Administered     Influenza Quad, Recombinant, p-free (RIV4) 10/15/2018     Influenza Vaccine IM 3yrs+ 4 Valent IIV4 10/07/2016, 10/18/2017     Pneumo Conj 13-V (2010&after) 10/15/2018     Pneumococcal 23 valent 04/19/2016     TD (ADULT, 7+) 09/13/2016       -STD screen:  negative 1/4/19.     -Mammogram: ordered previously bu she did not do it - I asked her to get it done.    -PAP: I asked her to come back for this.    No results found for: PAP    - Colon CA screen: declines Colonoscopy. Accepted FIT - negative 10/24/18.    - Hep C screen: negative 4/22/19    - Advanced Directive: referred today - she has has life alert    - Lung cancer screen: per HPI      ROS:    Const: No fevers, weight changes or night sweats recently.  ENT: No runny nose, sore throat or ear pain.  Resp: No cough or shortness of breath.  CV: No chest pain, dizziness or cardiac palpitations.  GI: No nausea, vomiting, diarrhea or constipation. Denies blood in stools or black stools.  : No dysuria, frequency or hematuria.  The rest of the ROS negative, other than listed on HPI    SH:    Marital status: , met new partner.  Kids: 1 daughter  Employment: disabled  Exercise: walking 3 times per week  Tobacco: per HPI  Etoh: no  Recreational drugs: no  Caffeine: no    Exam:    /74 (Cuff Size: Adult Regular)   Pulse 83   Temp 98.9  F (37.2  C) (Oral)   Wt 72.1 kg (159 lb)   SpO2 98%   BMI 29.80 kg/m      Gen: 59 year old female in no acute distress. There is psychomotor retardation, baseline.  Neck: No enlarged lymph nodes, thyromegally or other masses.  Lungs: Good air movement and otherwise clear.  CV: Heart RRR with no murmurs. No JVD, carotid bruits or leg edema.      Assessment and Plan - Decision Making    1. Peripheral polyneuropathy    Per HPI    - gabapentin (NEURONTIN) 100 MG capsule; Take 1 capsule (100 mg) by mouth 3 times daily  Dispense: 270 capsule; Refill: 3  - T4 free  - T4 free    2. Type 2 diabetes mellitus without complication, without long-term current use of insulin (H)    Per HPI    - metFORMIN (GLUCOPHAGE) 850 MG tablet; Take 1 tablet (850 mg) by mouth 2 times daily (with meals)  Dispense: 180 tablet; Refill: 1  - Hemoglobin A1c    3. Hypothyroidism, unspecified type    Per  HPI    - TSH with free T4 reflex    4. Mood disorder (H)    Per HPI    - clonazePAM (KLONOPIN) 1 MG tablet; Take 1 tablet (1 mg) by mouth 3 times daily as needed for anxiety  Dispense: 90 tablet; Refill: 0  - traZODone (DESYREL) 100 MG tablet; Take 2 tablets (200 mg) by mouth nightly as needed for sleep  Dispense: 180 tablet; Refill: 0  - FLUoxetine (PROZAC) 40 MG capsule; Take 1 capsule (40 mg) by mouth daily  Dispense: 90 capsule; Refill: 0      Written instructions given as follows:    Patient Instructions   See you soon on 6/5/19 for your PAP.    Mammo on 6/6/19 at 11:15am

## 2019-05-15 NOTE — LETTER
May 17, 2019    Skylar Bagley  [id number]  [facility] [address]        Imelda Cheng,    The initial thyroid test was a bit off but the confirmatory test was fine. Please continue same treatment.    See you soon for your PAP.    Regards,    Toñito Cortez M.D.    Results for orders placed or performed in visit on 05/15/19   TSH with free T4 reflex   Result Value Ref Range    TSH 0.24 (L) 0.40 - 4.00 mU/L   Hemoglobin A1c   Result Value Ref Range    Hemoglobin A1C 5.8 (H) 0 - 5.6 %   T4 free   Result Value Ref Range    T4 Free 1.09 0.76 - 1.46 ng/dL

## 2019-05-16 NOTE — RESULT ENCOUNTER NOTE
"Please mail Ms. Bagley her results with the following message.    \"Ms. Bagley,    Your labs are normal.    Please let me know if you have any questions.    Sincerely,  Quinten Wright MD  5/15/2019 10:24 PM\"  "

## 2019-05-17 NOTE — RESULT ENCOUNTER NOTE
Please mail letter:    Imelda Cheng,    The initial thyroid test was a bit off but the confirmatory test was fine. Please continue same treatment.    See you soon for your PAP.    Regards,    Toñito Cortez M.D.

## 2019-05-18 LAB
HBV DNA SERPL NAA+PROBE-ACNC: NORMAL [IU]/ML
HBV DNA SERPL NAA+PROBE-LOG IU: NORMAL {LOG_IU}/ML

## 2019-05-21 ENCOUNTER — TELEPHONE (OUTPATIENT)
Dept: RHEUMATOLOGY | Facility: CLINIC | Age: 59
End: 2019-05-21

## 2019-05-21 ENCOUNTER — TELEPHONE (OUTPATIENT)
Dept: FAMILY MEDICINE | Facility: CLINIC | Age: 59
End: 2019-05-21

## 2019-05-21 NOTE — TELEPHONE ENCOUNTER
Patient would like a call back regarding her lab test results, she would like them explained to her.

## 2019-05-21 NOTE — TELEPHONE ENCOUNTER
Patient states she would like a refill on leflunomide (ARAVA) 20 MG tablet and her latest lab results.  Please call.    Thank you.

## 2019-05-22 NOTE — TELEPHONE ENCOUNTER
Rheumatology team: Please call to notify Ms. Bagley that her last labs were normal; the results should have been receive via mail.  She should continue leflunomide and will need to call her pharmacy for the refill.     Quinten Wright MD  5/21/2019 11:31 PM

## 2019-05-22 NOTE — TELEPHONE ENCOUNTER
Called and left detailed message informing patient of the message below.    (okay to leave vm in other 5/22/19 phone encounter)    Jarno Lala RN....5/22/2019 8:51 AM

## 2019-05-22 NOTE — TELEPHONE ENCOUNTER
Rheumatology team: Please call to notify Ms. Bagley that she may stop taking ibuprofen.     Quinten Wright MD  5/21/2019 11:31 PM

## 2019-05-22 NOTE — TELEPHONE ENCOUNTER
Called patient and left message advising that she may stop Ibuprofen.    Jaron Lala RN....5/22/2019 8:48 AM

## 2019-06-11 DIAGNOSIS — F39 MOOD DISORDER (H): ICD-10-CM

## 2019-06-13 RX ORDER — TRAZODONE HYDROCHLORIDE 100 MG/1
TABLET ORAL
Qty: 180 TABLET | Refills: 0 | OUTPATIENT
Start: 2019-06-13

## 2019-06-17 DIAGNOSIS — F39 MOOD DISORDER (H): ICD-10-CM

## 2019-06-17 RX ORDER — TRAZODONE HYDROCHLORIDE 100 MG/1
TABLET ORAL
Qty: 180 TABLET | Refills: 0 | OUTPATIENT
Start: 2019-06-17

## 2019-06-20 ENCOUNTER — TELEPHONE (OUTPATIENT)
Dept: FAMILY MEDICINE | Facility: CLINIC | Age: 59
End: 2019-06-20

## 2019-06-20 DIAGNOSIS — F39 MOOD DISORDER (H): Primary | ICD-10-CM

## 2019-06-20 NOTE — TELEPHONE ENCOUNTER
Patient is calling to ask pcp to refill the medication Trazodone, risperidone, Prozac, and Klonopin until she is seen by psychiatrist patient has an assessment on 06/26/2019 than after that will be set up to see psychiatrist.    Please call to discuss    Thank you

## 2019-06-20 NOTE — TELEPHONE ENCOUNTER
Prescription's to [facility] on 5/15/19 for 90 day supply of trazodone and Fluoxetine.  Pt notified.    Pt given clonazepam dispense 90 on 5-15-19.  Pt states she has not filled this prescription yet, she can fill it on July 1 so she is good on this medication also.    She was getting Risperidone 3 mg one twice a day from [facility] previously.  Can you fill Risperidone?    July NAIDUN, RN

## 2019-06-21 RX ORDER — RISPERIDONE 3 MG/1
3 TABLET ORAL 2 TIMES DAILY
Qty: 90 TABLET | Refills: 0 | Status: SHIPPED | OUTPATIENT
Start: 2019-06-21

## 2019-06-21 NOTE — TELEPHONE ENCOUNTER
Pt has assessment on 6/26/19 and then should have appointment with psychiatrist in next month she thinks.  One month supply sent and she will contact us if appointment is further out.  July NAIDUN, RN

## 2019-06-28 DIAGNOSIS — M06.9 RHEUMATOID ARTHRITIS, INVOLVING UNSPECIFIED SITE, UNSPECIFIED RHEUMATOID FACTOR PRESENCE: ICD-10-CM

## 2019-07-01 DIAGNOSIS — F39 MOOD DISORDER (H): ICD-10-CM

## 2019-07-01 RX ORDER — IBUPROFEN 800 MG/1
TABLET, FILM COATED ORAL
Qty: 180 TABLET | Refills: 1 | Status: SHIPPED | OUTPATIENT
Start: 2019-07-01 | End: 2019-11-07

## 2019-07-01 NOTE — TELEPHONE ENCOUNTER
Left message on answering machine for patient/parent to call back.   [phone].  Kelsey White RN

## 2019-07-01 NOTE — TELEPHONE ENCOUNTER
"To Dr. Cortez to advise on the ibuprofen refill request.  Per the 4/22/19 rheumatology OV note: \"Advised trying to cut down on ibuprofen use if the leflunomide becomes effective.\"  Patient states that she is using 1 ibuprofen bid for her pain.  She states has to take it because of the RA, fibromyalgia, and arthritis she has all over.    Refill pending for refill need.  "

## 2019-07-01 NOTE — TELEPHONE ENCOUNTER
Controlled Substance Refill Request for clonazepam  Problem List Complete:  No         Last Office Visit with [facility] primary care provider: 5/15/19     Future Office visit:  none     Controlled substance agreement:   Encounter-Level CSA:    There are no encounter-level csa.      Patient-Level CSA:    There are no patient-level csa.            Last Urine Drug Screen: No results found for: CDAUT, No results found for: COMDAT, No results found for: THC13, PCP13, COC13, MAMP13, OPI13, AMP13, BZO13, TCA13, MTD13, BAR13, OXY13, PPX13, BUP13     [email or web address]         checked in past 3 months?  Yes 4/29/19  on your desk for review    Adriana NAIDUN, RN

## 2019-07-01 NOTE — TELEPHONE ENCOUNTER
Let her know this was to be addressed by her new psychiatrist - but we keep getting refill request.    I am reducing this to bid prn # 60 (from tid prn #90).    Please have another provider sign for me.    Thanks.    Toñito Cortez M.D.

## 2019-07-02 RX ORDER — CLONAZEPAM 1 MG/1
1 TABLET ORAL 2 TIMES DAILY PRN
Qty: 30 TABLET | Refills: 0 | Status: SHIPPED | OUTPATIENT
Start: 2019-07-02

## 2019-07-02 NOTE — TELEPHONE ENCOUNTER
To an alternate clinician to sign prescription for clonazepam for Dr. Cortez per his request.    Patient is aware of the reduction in the dosing.  She states has her assessment with psych today (had to cancel last month due to transportation - forgot to schedule it).  She will be able to set up her first psych appointment after the assessment is done.  Jennifer Marsh RN

## 2019-07-04 ENCOUNTER — TELEPHONE (OUTPATIENT)
Dept: FAMILY MEDICINE | Facility: CLINIC | Age: 59
End: 2019-07-04

## 2019-07-04 DIAGNOSIS — F39 MOOD DISORDER (H): ICD-10-CM

## 2019-07-04 NOTE — TELEPHONE ENCOUNTER
Reason for call:  Other   Patient called regarding (reason for call): pt calling in for rx, said 2nd request   Additional comments:     Phone number to reach patient:  Cell number on file:    Telephone Information:   Mobile [phone]       Best Time:  any    Can we leave a detailed message on this number?  YES

## 2019-07-05 RX ORDER — TRAZODONE HYDROCHLORIDE 100 MG/1
TABLET ORAL
Qty: 180 TABLET | Refills: 0 | Status: SHIPPED | OUTPATIENT
Start: 2019-07-05

## 2019-07-05 NOTE — TELEPHONE ENCOUNTER
Spoke with patient on phone. RX for 3 month supply Trazodone sent to [facility] pharmacy on 5/15/19. Says she was not given the 3 month supply. She will contact the pharmacy.  Tamica Wilks RN

## 2019-07-15 ENCOUNTER — TRANSFERRED RECORDS (OUTPATIENT)
Dept: HEALTH INFORMATION MANAGEMENT | Facility: CLINIC | Age: 59
End: 2019-07-15

## 2019-07-18 DIAGNOSIS — M05.9 SEROPOSITIVE RHEUMATOID ARTHRITIS (H): ICD-10-CM

## 2019-07-18 NOTE — TELEPHONE ENCOUNTER
Should have refills at the [facility] - [place], [place] - [address]  Please verify with patient  Is she switching to [facility]?    Zeinab Oconnell RN

## 2019-07-19 NOTE — TELEPHONE ENCOUNTER
Rheumatology team: Please call to notify Ms. Bagley that the Enbrel prescription has been sent to the [facility] as requested.    Quinten Wright MD  7/19/2019 2:53 PM

## 2019-07-22 DIAGNOSIS — E03.9 HYPOTHYROIDISM, UNSPECIFIED TYPE: ICD-10-CM

## 2019-07-22 DIAGNOSIS — F39 MOOD DISORDER (H): ICD-10-CM

## 2019-07-22 NOTE — LETTER
July 25, 2019    Skylar Bagley  [address]  [place] [facility] [place] [address]    Dear Skylar,       We recently received a refill request for FLUoxetine (PROZAC).  We have refilled this for a one time 30 day supply only because you are to address this refill with a Psychiatrist per your provider Dr. Cortez.          Thank you,     Toñito Cortez MD  [facility]  [phone]

## 2019-07-23 NOTE — TELEPHONE ENCOUNTER
Patient canceled appointment for PAP.  Please advise on refill,  Appointment needed for further refills or 90 day refill.  Kelsey White RN

## 2019-07-24 RX ORDER — LEVOTHYROXINE SODIUM 100 UG/1
TABLET ORAL
Qty: 90 TABLET | Refills: 3 | Status: SHIPPED | OUTPATIENT
Start: 2019-07-24 | End: 2020-02-05

## 2019-07-24 RX ORDER — FLUOXETINE 40 MG/1
40 CAPSULE ORAL DAILY
Qty: 30 CAPSULE | Refills: 0 | Status: SHIPPED | OUTPATIENT
Start: 2019-07-24

## 2019-07-24 NOTE — TELEPHONE ENCOUNTER
Let her know that I have again refilled Prozac short term. She needs to address this further with her psychiatrist.    I have refilled the Synthroid.    Help her reschedule the PAP.    Toñito Cortez M.D.

## 2019-07-25 NOTE — TELEPHONE ENCOUNTER
FLORENTINO for patient to call [facility] at [phone]. Also sent letter needs an appointment with Psychiatrist for refill of Prozac.  ASHUTOSH Fontaine

## 2019-08-19 ENCOUNTER — OFFICE VISIT (OUTPATIENT)
Dept: RHEUMATOLOGY | Facility: CLINIC | Age: 59
End: 2019-08-19
Payer: COMMERCIAL

## 2019-08-19 ENCOUNTER — TELEPHONE (OUTPATIENT)
Dept: RHEUMATOLOGY | Facility: CLINIC | Age: 59
End: 2019-08-19

## 2019-08-19 VITALS
WEIGHT: 159.8 LBS | DIASTOLIC BLOOD PRESSURE: 79 MMHG | OXYGEN SATURATION: 97 % | BODY MASS INDEX: 29.95 KG/M2 | TEMPERATURE: 98.1 F | HEART RATE: 96 BPM | SYSTOLIC BLOOD PRESSURE: 130 MMHG

## 2019-08-19 DIAGNOSIS — Z78.0 POST-MENOPAUSAL: ICD-10-CM

## 2019-08-19 DIAGNOSIS — M06.9 RHEUMATOID ARTHRITIS INVOLVING MULTIPLE SITES, UNSPECIFIED RHEUMATOID FACTOR PRESENCE: Primary | ICD-10-CM

## 2019-08-19 DIAGNOSIS — Z13.820 OSTEOPOROSIS SCREENING: ICD-10-CM

## 2019-08-19 DIAGNOSIS — Z79.899 HIGH RISK MEDICATION USE: ICD-10-CM

## 2019-08-19 LAB
ALBUMIN SERPL-MCNC: 4.4 G/DL (ref 3.4–5)
ALP SERPL-CCNC: 68 U/L (ref 40–150)
ALT SERPL W P-5'-P-CCNC: 37 U/L (ref 0–50)
AST SERPL W P-5'-P-CCNC: 28 U/L (ref 0–45)
BASOPHILS # BLD AUTO: 0 10E9/L (ref 0–0.2)
BASOPHILS NFR BLD AUTO: 0.4 %
BILIRUB DIRECT SERPL-MCNC: <0.1 MG/DL (ref 0–0.2)
BILIRUB SERPL-MCNC: 0.2 MG/DL (ref 0.2–1.3)
CREAT SERPL-MCNC: 1.06 MG/DL (ref 0.52–1.04)
CRP SERPL-MCNC: <2.9 MG/L (ref 0–8)
DIFFERENTIAL METHOD BLD: NORMAL
EOSINOPHIL # BLD AUTO: 0.2 10E9/L (ref 0–0.7)
EOSINOPHIL NFR BLD AUTO: 2.1 %
ERYTHROCYTE [DISTWIDTH] IN BLOOD BY AUTOMATED COUNT: 13 % (ref 10–15)
ERYTHROCYTE [SEDIMENTATION RATE] IN BLOOD BY WESTERGREN METHOD: 9 MM/H (ref 0–30)
GFR SERPL CREATININE-BSD FRML MDRD: 57 ML/MIN/{1.73_M2}
HCT VFR BLD AUTO: 41.3 % (ref 35–47)
HGB BLD-MCNC: 13.7 G/DL (ref 11.7–15.7)
LYMPHOCYTES # BLD AUTO: 2.7 10E9/L (ref 0.8–5.3)
LYMPHOCYTES NFR BLD AUTO: 39 %
MCH RBC QN AUTO: 31.1 PG (ref 26.5–33)
MCHC RBC AUTO-ENTMCNC: 33.2 G/DL (ref 31.5–36.5)
MCV RBC AUTO: 94 FL (ref 78–100)
MONOCYTES # BLD AUTO: 0.7 10E9/L (ref 0–1.3)
MONOCYTES NFR BLD AUTO: 10.2 %
NEUTROPHILS # BLD AUTO: 3.4 10E9/L (ref 1.6–8.3)
NEUTROPHILS NFR BLD AUTO: 48.3 %
PLATELET # BLD AUTO: 233 10E9/L (ref 150–450)
PROT SERPL-MCNC: 8 G/DL (ref 6.8–8.8)
RBC # BLD AUTO: 4.4 10E12/L (ref 3.8–5.2)
WBC # BLD AUTO: 7 10E9/L (ref 4–11)

## 2019-08-19 PROCEDURE — 80076 HEPATIC FUNCTION PANEL: CPT | Performed by: INTERNAL MEDICINE

## 2019-08-19 PROCEDURE — 36415 COLL VENOUS BLD VENIPUNCTURE: CPT | Performed by: INTERNAL MEDICINE

## 2019-08-19 PROCEDURE — 85652 RBC SED RATE AUTOMATED: CPT | Performed by: INTERNAL MEDICINE

## 2019-08-19 PROCEDURE — 82565 ASSAY OF CREATININE: CPT | Performed by: INTERNAL MEDICINE

## 2019-08-19 PROCEDURE — 86140 C-REACTIVE PROTEIN: CPT | Performed by: INTERNAL MEDICINE

## 2019-08-19 PROCEDURE — 99214 OFFICE O/P EST MOD 30 MIN: CPT | Performed by: INTERNAL MEDICINE

## 2019-08-19 PROCEDURE — 85025 COMPLETE CBC W/AUTO DIFF WBC: CPT | Performed by: INTERNAL MEDICINE

## 2019-08-19 RX ORDER — LEFLUNOMIDE 20 MG/1
20 TABLET ORAL DAILY
Qty: 90 TABLET | Refills: 1 | Status: SHIPPED | OUTPATIENT
Start: 2019-08-19 | End: 2020-01-23 | Stop reason: SINTOL

## 2019-08-19 ASSESSMENT — PAIN SCALES - GENERAL: PAINLEVEL: EXTREME PAIN (8)

## 2019-08-19 NOTE — PROGRESS NOTES
Rheumatology Clinic Visit      Skylar Bagley MRN# [id number]   YOB: 1960 Age: 59 year old      Date of visit: 8/19/19   PCP: Dr. Toñito Coretz    Chief Complaint   Patient presents with:  RECHECK: 4 mth follow up visit.  Pt has been having a hard time grabbing dishes       Assessment and Plan     1.  Rheumatoid arthritis: Reportedly diagnosed more than 25 years ago.  Establishing care with me on 4/22/2019.  Previously treated with methotrexate (LFT elevation), hydroxychloroquine (retinal toxicity).  Currently on Enbrel 50 mg SQ weekly, leflunomide (started 4/22/2019), and ibuprofen 800 mg twice daily.  Mild synovitis on exam today.  Aspirin allergy per patient.  Discussed other tx options. Stop Enbrel and start Humira.   - Discontinue Enbrel 50mg SQ every 7 days   - Start Humira 40mg SQ every 14 days  - Continue leflunomide 20mg daily  - Ibuprofen 800mg BID PRN   - Labs today: CBC, Creatinine, Hepatic Panel, ESR, CRP  - Labs monthly [id number]: CBC, Cr, Hepatic Panel  - Labs in 3 months: CBC, Creatinine, Hepatic Panel, ESR, CRP    # Adalimumab (Humira) Risks and Benefits: The risks and benefits of adalimumab were discussed in detail and the patient verbalized understanding.  The risks discussed include, but are not limited to, the risk for hypersensitivity, anaphylaxis, anaphylactoid reactions, an increased risk for serious infections leading to hospitalization or death, a possible increased risk for lymphoma and other malignancies, a possible worsening of demyelinating diseases, a possible worsening of heart failure, risk for cytopenias, risk for drug induced lupus, possible reactivation of hepatitis B, and possible reactivation of latent tuberculosis.  Subcutaneous injections may result in injection site reactions and/or pain at the site of injection.  The most common adverse reactions are infections, injection site reactions, headache, and rash.  It was discussed that the medication would need to  be discontinued if a serious infection develops.  It was discussed that live vaccinations should not be received while using adalimumab or within 30 days prior to starting adalimumab.  I encouraged reviewing the package insert and asking any questions about the medication.      2. Fibromyalgia: improved with gabapentin per patient. Sleep study not done yet (snores, wakes up tired, and sleeps for 8-10 hours per night).  She plans to follow-up with Dr. Cortez for further management.     3. Primary osteoarthritis of both hands: evidenced by Heberden's and Yasmin's nodes.  We again reviewed the diagnosis and natural progression of osteoarthritis    4. Cigarette Smoking:  Encouraged complete cessation and discussed the health benefits.      5.  Osteoporosis screening: Risk factors include postmenopausal status, rheumatoid arthritis, cigarette smoking.  Check DEXA  - DEXA ordered; patient was advised to call to schedule    Ms. Bagley verbalized agreement with and understanding of the rational for the diagnosis and treatment plan.  All questions were answered to best of my ability and the patient's satisfaction. Ms. Bagley was advised to contact the clinic with any questions that may arise after the clinic visit.      Thank you for involving me in the care of the patient    Return to clinic: 3 months      HPI   Skylar Bagley is a 59 year old female with a past medical history significant for diabetes, GERD, hypothyroidism, hyperlipidemia, anxiety, depression, and RA who is seen for follow-up of rheumatoid arthritis.    Today, Ms. Bagley reports that leflunomide has been partially beneficial.  Still tolerating Enbrel.  Pain and swelling at her MCPs and PIPs that are worse in the morning and improved with time and activity.  Morning stiffness for about 1-1.5 hours.  She reports that gabapentin 300 mg 3 times daily was helpful for her whole body pain but she was worried about gaining weight so in coordination  with her primary care provider she reduce the dose to 100 mg tablets but is now back up to taking 300 mg 3 times daily.  She plans to follow-up with Dr. Cortez for fibromyalgia management.  Has not had a sleep study done yet.  Sleep quality has not changed since last seen.    Denies fevers, chills, nausea, or vomiting.  Chronic constipation.  No diarrhea.   No abdominal pain. No chest pain/pressure, palpitations, or shortness of breath. No LE swelling. No neck pain. No oral or nasal sores.  No rash. No sicca symptoms. No photosensitivity or photophobia. No eye pain or redness. No history of inflammatory eye disease.  No history of inflammatory bowel disease.  No history of DVT, pulmonary embolism, or miscarriage.   No history of serositis.        Tobacco: Cigarette smoking  EtOH: None  Drugs: None  Occupation:  Reportedly on disability because of rheumatoid arthritis    ROS   GEN: No fevers, chills, night sweats, or weight change  SKIN: No itching, rashes, sores  HEENT: No epistaxis. No oral or nasal ulcers.  CV: No chest pain, pressure, palpitations, or dyspnea on exertion.  PULM: No SOB, wheeze, cough.  GI: See HPI.  No blood in stool  : No blood in urine.  MSK: See HPI.  NEURO: No numbness or tingling  ENDO: No heat/cold intolerance.   EXT: No LE swelling  PSYCH: See HPI    Active Problem List     Patient Active Problem List   Diagnosis     Type 2 diabetes mellitus without complication, without long-term current use of insulin (H)     Dyslipidemia     Class 1 obesity with serious comorbidity and body mass index (BMI) of 31.0 to 31.9 in adult, unspecified obesity type     Hypothyroidism, unspecified type     Gastroesophageal reflux disease, esophagitis presence not specified     Rheumatoid arthritis, involving unspecified site, unspecified rheumatoid factor presence (H)     Past Medical History     Past Medical History:   Diagnosis Date     Arthritis      Depressive disorder      Diabetes (H)      Thyroid disease       Past Surgical History     Past Surgical History:   Procedure Laterality Date     VAG DELIV ONLY,PREV C-SECTN       Allergy     Allergies   Allergen Reactions     Asa [Aspirin]      Current Medication List     Current Outpatient Medications   Medication Sig     ACE/ARB/ARNI NOT PRESCRIBED, INTENTIONAL, Please choose reason not prescribed, below     adalimumab (HUMIRA *CF*) 40 MG/0.4ML pen kit Inject 0.4 mLs (40 mg) Subcutaneous every 14 days . Hold for signs of infection, then seek medical attention.     ASPIRIN NOT PRESCRIBED (INTENTIONAL) Please choose reason not prescribed, below     atorvastatin (LIPITOR) 40 MG tablet Take 1 tablet (40 mg) by mouth daily     blood glucose monitoring (CONTOUR NEXT TEST) test strip TEST 4 TIMES A DAY     calcium carbonate 500 mg-vitamin D 200 units (OSCAL WITH D;OYSTER SHELL CALCIUM) 500-200 MG-UNIT per tablet Take 1 tablet by mouth     clonazePAM (KLONOPIN) 1 MG tablet Take 1 tablet (1 mg) by mouth 2 times daily as needed for anxiety     FLUoxetine (PROZAC) 40 MG capsule TAKE 1 CAPSULE (40 MG) BY MOUTH DAILY     fluticasone (FLONASE) 50 MCG/ACT spray Spray 1 spray in nostril     gabapentin (NEURONTIN) 100 MG capsule Take 1 capsule (100 mg) by mouth 3 times daily     ibuprofen (ADVIL/MOTRIN) 800 MG tablet TAKE 1 TABLET TWICE DAILY AS NEEDED FOR  MODERATE  PAIN     Lancets 30G MISC Test 4 times per day. ICD-10 code E11.9     leflunomide (ARAVA) 20 MG tablet Take 1 tablet (20 mg) by mouth daily     levothyroxine (SYNTHROID/LEVOTHROID) 100 MCG tablet TAKE 1 TABLET EVERY DAY     metFORMIN (GLUCOPHAGE) 850 MG tablet Take 1 tablet (850 mg) by mouth 2 times daily (with meals)     omeprazole (PRILOSEC) 40 MG capsule      risperiDONE (RISPERDAL) 3 MG tablet Take 1 tablet (3 mg) by mouth 2 times daily     traZODone (DESYREL) 100 MG tablet TAKE 2 TABLETS (200 MG) BY MOUTH AT NIGHT AS NEEDED FOR SLEEP   (SUBSTITUTED FOR DESYREL)     UNABLE TO FIND remicade     UNABLE TO FIND resperidone  "    No current facility-administered medications for this visit.          Social History   See HPI    Family History   History reviewed. No pertinent family history.      Physical Exam     Temp Readings from Last 3 Encounters:   08/19/19 98.1  F (36.7  C) (Oral)   05/15/19 98.9  F (37.2  C) (Oral)   01/04/19 98  F (36.7  C) (Oral)     BP Readings from Last 5 Encounters:   08/19/19 130/79   05/15/19 116/74   04/22/19 113/73   01/04/19 122/77   11/23/18 120/74     Pulse Readings from Last 1 Encounters:   08/19/19 96     Resp Readings from Last 1 Encounters:   No data found for Resp     Estimated body mass index is 29.95 kg/m  as calculated from the following:    Height as of 4/22/19: 1.556 m (5' 1.25\").    Weight as of this encounter: 72.5 kg (159 lb 12.8 oz).    GEN: NAD  HEENT: MMM. No oral lesions. Anicteric, noninjected sclera  CV: S1, S2. RRR. No m/r/g.  PULM: CTA bilaterally. No w/c.  MSK: Synovial swelling and tenderness palpation of the right second-third MCPs and left second-fourth MCPs.  PIPs and DIPs without swelling but were diffusely tender to palpation.  Heberden's and Yasmin's nodes present.  Wrists, elbows, shoulders, knees, ankles, and MTPs diffusely tender to palpation but without swelling, increased warmth, or overlying erythema.  Hips tender to palpation bilaterally.  Fibromyalgia tender points positive.     NEURO: UE and LE strengths 5/5 and equal bilaterally.   SKIN: No rash.  No nail pitting.  EXT: No LE edema  PSYCH: Alert. Appropriate.    Labs / Imaging (select studies)   RF/CCP  Recent Labs   Lab Test 04/22/19  1436   CCPIGG >340*   *     CBC  Recent Labs   Lab Test 05/15/19  1409 04/22/19  1436 01/04/19  1419 10/15/18  1407   WBC 7.8 6.9 9.0 8.1   RBC 4.29 4.22 4.36 4.12   HGB 13.4 13.2 13.3 12.8   HCT 39.5 38.8 40.0 38.0   MCV 92 92 92 92   RDW 12.6 12.8 12.9 12.8    248 272 260   MCH 31.2 31.3 30.5 31.1   MCHC 33.9 34.0 33.3 33.7   NEUTROPHIL 50.6 48.3  --  57.8   LYMPH " 37.2 40.0  --  32.8   MONOCYTE 9.8 8.4  --  7.5   EOSINOPHIL 1.9 3.0  --  1.5   BASOPHIL 0.5 0.3  --  0.4   ANEU 3.9 3.3  --  4.7   ALYM 2.9 2.8  --  2.7   FARHAD 0.8 0.6  --  0.6   AEOS 0.2 0.2  --  0.1   ABAS 0.0 0.0  --  0.0     CMP  Recent Labs   Lab Test 05/15/19  1409 04/22/19  1436 01/04/19  1419  10/15/18  1407   NA  --   --   --   --  141   POTASSIUM  --   --   --   --  4.0   CHLORIDE  --   --   --   --  111*   CO2  --   --   --   --  22   ANIONGAP  --   --   --   --  8   GLC  --   --   --   --  71   BUN  --   --   --   --  15   CR 0.87 0.86 0.98  --  0.96   GFRESTIMATED 73 74 63  --  59*   GFRESTBLACK 84 86 73  --  72   MELANIE  --   --   --   --  9.2   BILITOTAL 0.3 0.2  --   --  0.2   ALBUMIN 4.5 4.3 4.5   < > 4.3   PROTTOTAL 8.1 7.6  --   --  7.8   ALKPHOS 61 66  --   --  58   AST 17 20 15  --  21   ALT 35 39 30  --  35    < > = values in this interval not displayed.     Calcium/VitaminD  Recent Labs   Lab Test 10/15/18  1407   MELANIE 9.2     ESR/CRP  Recent Labs   Lab Test 04/22/19  1436   SED 9   CRP <2.9     Hepatitis B  Recent Labs   Lab Test 05/15/19  1409 04/22/19  1436   AUSAB  --  49.89*   HBCAB  --  Reactive*   HEPBANG  --  Nonreactive   HBQLOG Not Calculated  --      Hepatitis C  Recent Labs   Lab Test 04/22/19  1436   HCVAB Nonreactive     Lyme ab screening  Recent Labs   Lab Test 04/22/19  1436   LYMEGM 0.09     Tuberculosis Screening  Recent Labs   Lab Test 04/22/19  1436   TBRES Negative     HIV Screening  Recent Labs   Lab Test 01/04/19  1416   HIAGAB Nonreactive     Immunization History     Immunization History   Administered Date(s) Administered     Influenza Quad, Recombinant, p-free (RIV4) 10/15/2018     Influenza Vaccine IM 3yrs+ 4 Valent IIV4 10/07/2016, 10/18/2017     Pneumo Conj 13-V (2010&after) 10/15/2018     Pneumococcal 23 valent 04/19/2016     TD (ADULT, 7+) 09/13/2016          Chart documentation done in part with Dragon Voice recognition Software. Although reviewed after completion,  some word and grammatical error may remain.    Quinten Wright MD

## 2019-08-19 NOTE — NURSING NOTE
RAPID3 (0-30) Cumulative Score  22.7          RAPID3 Weighted Score (divide #4 by 3 and that is the weighted score)  7.5         Annetta Lundy, [facility]  8/19/2019  3:38 PM

## 2019-08-19 NOTE — TELEPHONE ENCOUNTER
PA Initiation    Medication: humira pa pending   Insurance Company:    Pharmacy Filling the Rx: [facility] - [place], [place] - [address] [place] [address]  Filling Pharmacy Phone:    Filling Pharmacy Fax:    Start Date: 8/19/2019

## 2019-08-19 NOTE — PATIENT INSTRUCTIONS
STOP SMOKING INSTRUCTION  Instruction is provided on the importance of smoking cessation and its impact on current and future health.    - alternatives available to help   - making commitment and decision to quit   - The nature of nicotine addiction is discussed   - behavioral therapy is discussed and suggested.    - nicotine replacement therapyis discussed.    - Chantix side effects and risk discussed   Handout given with warning about psychiatric risks- advised to report if excessive dysphoria

## 2019-08-21 NOTE — TELEPHONE ENCOUNTER
Prior Authorization Approval    Authorization Effective Date: 8/21/2019  Authorization Expiration Date: 8/20/2021  Medication: Humira- Approved  Approved Dose/Quantity: 40mg/ 0.4ml  Reference #: [id number]   Insurance Company: [employer] - Phone [phone] Fax [phone]  Expected CoPay: $0     CoPay Card Available: No    Foundation Assistance Needed: N/A  Which Pharmacy is filling the prescription (Not needed for infusion/clinic administered): [facility] - [address]  Pharmacy Notified: Yes  Patient Notified: Yes

## 2019-08-21 NOTE — RESULT ENCOUNTER NOTE
Rheumatology team: Please call to notify Ms. Bagley that the most recent labs showed a slight reduction of renal function, and this lab may be affected by hydration status.  Please advise that she ensure she is well-hydrated for her next labs.  This will be reevaluated with her labs next month.  Quinten Wright MD  8/21/2019 4:50 PM

## 2019-08-22 ENCOUNTER — TELEPHONE (OUTPATIENT)
Dept: RHEUMATOLOGY | Facility: CLINIC | Age: 59
End: 2019-08-22

## 2019-08-22 NOTE — TELEPHONE ENCOUNTER
Contacted Pt, reviewed lab work notifying Ms. Bagley that the most recent labs showed a slight reduction of renal function, and this lab may be affected by hydration status.  Please advise that she ensure she is well-hydrated for her next labs.  This will be reevaluated with her labs next month. .  Pt had no questions or concerns, agrees and understands.    Annetta Lundy CMA  8/22/2019  8:35 AM

## 2019-09-20 DIAGNOSIS — Z79.899 HIGH RISK MEDICATION USE: ICD-10-CM

## 2019-09-20 DIAGNOSIS — M06.9 RHEUMATOID ARTHRITIS INVOLVING MULTIPLE SITES, UNSPECIFIED RHEUMATOID FACTOR PRESENCE: ICD-10-CM

## 2019-09-20 LAB
ALBUMIN SERPL-MCNC: 4.1 G/DL (ref 3.4–5)
ALP SERPL-CCNC: 64 U/L (ref 40–150)
ALT SERPL W P-5'-P-CCNC: 41 U/L (ref 0–50)
AST SERPL W P-5'-P-CCNC: 28 U/L (ref 0–45)
BASOPHILS # BLD AUTO: 0 10E9/L (ref 0–0.2)
BASOPHILS NFR BLD AUTO: 0.4 %
BILIRUB DIRECT SERPL-MCNC: <0.1 MG/DL (ref 0–0.2)
BILIRUB SERPL-MCNC: 0.4 MG/DL (ref 0.2–1.3)
CREAT SERPL-MCNC: 0.97 MG/DL (ref 0.52–1.04)
DIFFERENTIAL METHOD BLD: NORMAL
EOSINOPHIL # BLD AUTO: 0.2 10E9/L (ref 0–0.7)
EOSINOPHIL NFR BLD AUTO: 4.1 %
ERYTHROCYTE [DISTWIDTH] IN BLOOD BY AUTOMATED COUNT: 12.7 % (ref 10–15)
GFR SERPL CREATININE-BSD FRML MDRD: 64 ML/MIN/{1.73_M2}
HCT VFR BLD AUTO: 36 % (ref 35–47)
HGB BLD-MCNC: 12 G/DL (ref 11.7–15.7)
LYMPHOCYTES # BLD AUTO: 2.2 10E9/L (ref 0.8–5.3)
LYMPHOCYTES NFR BLD AUTO: 40.4 %
MCH RBC QN AUTO: 31.1 PG (ref 26.5–33)
MCHC RBC AUTO-ENTMCNC: 33.3 G/DL (ref 31.5–36.5)
MCV RBC AUTO: 93 FL (ref 78–100)
MONOCYTES # BLD AUTO: 0.5 10E9/L (ref 0–1.3)
MONOCYTES NFR BLD AUTO: 9.7 %
NEUTROPHILS # BLD AUTO: 2.4 10E9/L (ref 1.6–8.3)
NEUTROPHILS NFR BLD AUTO: 45.4 %
PLATELET # BLD AUTO: 194 10E9/L (ref 150–450)
PROT SERPL-MCNC: 7.4 G/DL (ref 6.8–8.8)
RBC # BLD AUTO: 3.86 10E12/L (ref 3.8–5.2)
WBC # BLD AUTO: 5.4 10E9/L (ref 4–11)

## 2019-09-20 PROCEDURE — 82565 ASSAY OF CREATININE: CPT | Performed by: INTERNAL MEDICINE

## 2019-09-20 PROCEDURE — 80076 HEPATIC FUNCTION PANEL: CPT | Performed by: INTERNAL MEDICINE

## 2019-09-20 PROCEDURE — 36415 COLL VENOUS BLD VENIPUNCTURE: CPT | Performed by: INTERNAL MEDICINE

## 2019-09-20 PROCEDURE — 85025 COMPLETE CBC W/AUTO DIFF WBC: CPT | Performed by: INTERNAL MEDICINE

## 2019-09-23 DIAGNOSIS — E11.9 TYPE 2 DIABETES MELLITUS WITHOUT COMPLICATION, WITHOUT LONG-TERM CURRENT USE OF INSULIN (H): ICD-10-CM

## 2019-09-23 NOTE — TELEPHONE ENCOUNTER
metFORMIN (GLUCOPHAGE) 850 MG tablet 180 tablet 1 5/15/2019     05/15/19 Sent (none) Toñito Cortez MD   [facility] - [place], [place] - [address]    Duplicate.  Thank you. Alice Gunter R.N.

## 2019-09-25 NOTE — RESULT ENCOUNTER NOTE
Rheumatology team: Please call to notify Ms. Bagley that labs are normal.  It is recommended to have a follow-up rheumatology appointment 3-4 months after the last rheumatology clinic visit; please assist with scheduling this if she would like.    Quinten Wright MD  9/25/2019 1:24 AM

## 2019-10-21 DIAGNOSIS — Z79.899 HIGH RISK MEDICATION USE: ICD-10-CM

## 2019-10-21 DIAGNOSIS — M06.9 RHEUMATOID ARTHRITIS INVOLVING MULTIPLE SITES, UNSPECIFIED RHEUMATOID FACTOR PRESENCE: ICD-10-CM

## 2019-10-21 LAB
ALBUMIN SERPL-MCNC: 4 G/DL (ref 3.4–5)
ALP SERPL-CCNC: 74 U/L (ref 40–150)
ALT SERPL W P-5'-P-CCNC: 37 U/L (ref 0–50)
AST SERPL W P-5'-P-CCNC: 19 U/L (ref 0–45)
BASOPHILS # BLD AUTO: 0 10E9/L (ref 0–0.2)
BASOPHILS NFR BLD AUTO: 0.3 %
BILIRUB DIRECT SERPL-MCNC: <0.1 MG/DL (ref 0–0.2)
BILIRUB SERPL-MCNC: 0.2 MG/DL (ref 0.2–1.3)
CREAT SERPL-MCNC: 0.85 MG/DL (ref 0.52–1.04)
DIFFERENTIAL METHOD BLD: NORMAL
EOSINOPHIL # BLD AUTO: 0.2 10E9/L (ref 0–0.7)
EOSINOPHIL NFR BLD AUTO: 2.5 %
ERYTHROCYTE [DISTWIDTH] IN BLOOD BY AUTOMATED COUNT: 12.7 % (ref 10–15)
GFR SERPL CREATININE-BSD FRML MDRD: 75 ML/MIN/{1.73_M2}
HCT VFR BLD AUTO: 35.6 % (ref 35–47)
HGB BLD-MCNC: 11.8 G/DL (ref 11.7–15.7)
LYMPHOCYTES # BLD AUTO: 1.8 10E9/L (ref 0.8–5.3)
LYMPHOCYTES NFR BLD AUTO: 28.1 %
MCH RBC QN AUTO: 31.1 PG (ref 26.5–33)
MCHC RBC AUTO-ENTMCNC: 33.1 G/DL (ref 31.5–36.5)
MCV RBC AUTO: 94 FL (ref 78–100)
MONOCYTES # BLD AUTO: 0.6 10E9/L (ref 0–1.3)
MONOCYTES NFR BLD AUTO: 9.5 %
NEUTROPHILS # BLD AUTO: 3.8 10E9/L (ref 1.6–8.3)
NEUTROPHILS NFR BLD AUTO: 59.6 %
PLATELET # BLD AUTO: 202 10E9/L (ref 150–450)
PROT SERPL-MCNC: 7.6 G/DL (ref 6.8–8.8)
RBC # BLD AUTO: 3.8 10E12/L (ref 3.8–5.2)
WBC # BLD AUTO: 6.4 10E9/L (ref 4–11)

## 2019-10-21 PROCEDURE — 36415 COLL VENOUS BLD VENIPUNCTURE: CPT | Performed by: INTERNAL MEDICINE

## 2019-10-21 PROCEDURE — 82565 ASSAY OF CREATININE: CPT | Performed by: INTERNAL MEDICINE

## 2019-10-21 PROCEDURE — 85025 COMPLETE CBC W/AUTO DIFF WBC: CPT | Performed by: INTERNAL MEDICINE

## 2019-10-21 PROCEDURE — 80076 HEPATIC FUNCTION PANEL: CPT | Performed by: INTERNAL MEDICINE

## 2019-10-21 NOTE — LETTER
[address] [place] [address]    October 25, 2019    Skylar Bagley  [address]  [place] [address]          Dear Skylar,    Your labs are normal.     Please let me know if you have any questions    Enclosed is a copy of your results.     Results for orders placed or performed in visit on 10/21/19   Hepatic panel   Result Value Ref Range    Bilirubin Direct <0.1 0.0 - 0.2 mg/dL    Bilirubin Total 0.2 0.2 - 1.3 mg/dL    Albumin 4.0 3.4 - 5.0 g/dL    Protein Total 7.6 6.8 - 8.8 g/dL    Alkaline Phosphatase 74 40 - 150 U/L    ALT 37 0 - 50 U/L    AST 19 0 - 45 U/L   Creatinine   Result Value Ref Range    Creatinine 0.85 0.52 - 1.04 mg/dL    GFR Estimate 75 >60 mL/min/[1.73_m2]    GFR Estimate If Black 86 >60 mL/min/[1.73_m2]   CBC with platelets differential   Result Value Ref Range    WBC 6.4 4.0 - 11.0 10e9/L    RBC Count 3.80 3.8 - 5.2 10e12/L    Hemoglobin 11.8 11.7 - 15.7 g/dL    Hematocrit 35.6 35.0 - 47.0 %    MCV 94 78 - 100 fl    MCH 31.1 26.5 - 33.0 pg    MCHC 33.1 31.5 - 36.5 g/dL    RDW 12.7 10.0 - 15.0 %    Platelet Count 202 150 - 450 10e9/L    % Neutrophils 59.6 %    % Lymphocytes 28.1 %    % Monocytes 9.5 %    % Eosinophils 2.5 %    % Basophils 0.3 %    Absolute Neutrophil 3.8 1.6 - 8.3 10e9/L    Absolute Lymphocytes 1.8 0.8 - 5.3 10e9/L    Absolute Monocytes 0.6 0.0 - 1.3 10e9/L    Absolute Eosinophils 0.2 0.0 - 0.7 10e9/L    Absolute Basophils 0.0 0.0 - 0.2 10e9/L    Diff Method Automated Method        If you have any questions or concerns, please call myself or my nurse at [phone].      Sincerely,        Quinten Wright MD/perea

## 2019-10-24 NOTE — RESULT ENCOUNTER NOTE
"Please mail Ms. Bagley her results with the following message.    \"Ms. Bagley,    Your labs are normal.    Please let me know if you have any questions.    Sincerely,  Quinten Wright MD  10/24/2019 12:40 AM\"  "

## 2019-11-07 DIAGNOSIS — M06.9 RHEUMATOID ARTHRITIS, INVOLVING UNSPECIFIED SITE, UNSPECIFIED RHEUMATOID FACTOR PRESENCE: ICD-10-CM

## 2019-11-07 DIAGNOSIS — E11.9 TYPE 2 DIABETES MELLITUS WITHOUT COMPLICATION, WITHOUT LONG-TERM CURRENT USE OF INSULIN (H): ICD-10-CM

## 2019-11-07 DIAGNOSIS — E78.5 DYSLIPIDEMIA: ICD-10-CM

## 2019-11-07 RX ORDER — IBUPROFEN 800 MG/1
TABLET, FILM COATED ORAL
Qty: 60 TABLET | Refills: 0 | Status: SHIPPED | OUTPATIENT
Start: 2019-11-07 | End: 2019-12-13

## 2019-11-07 RX ORDER — ATORVASTATIN CALCIUM 40 MG/1
TABLET, FILM COATED ORAL
Qty: 30 TABLET | Refills: 0 | Status: SHIPPED | OUTPATIENT
Start: 2019-11-07 | End: 2019-12-13

## 2019-11-07 NOTE — TELEPHONE ENCOUNTER
Per OV note dated 5/15/19 from Dr. Cortez is as follows:    Return in about 3 weeks (around 6/5/2019) for Physical Exam.     Refilled x 30 days needs to be seen.  Thank you. Alice Gunter R.N.    Routing refill request to provider for review/approval because:  Willa given x1 and patient did not follow up and there is no up coming appointment, please advise.  Thank you. Alice Gunter R.N.

## 2019-11-22 ENCOUNTER — TELEPHONE (OUTPATIENT)
Dept: RHEUMATOLOGY | Facility: CLINIC | Age: 59
End: 2019-11-22

## 2019-11-25 NOTE — TELEPHONE ENCOUNTER
RN left message for patient to return call to RN's direct line @ [phone] until 5 pm or if call is returned after hours to call the clinic number and ask to speak to a speciality nurse.    Macy Mcgraw RN

## 2019-11-25 NOTE — TELEPHONE ENCOUNTER
RN: Mela and discussion in clinic is needed.  Please call Ms. Bagley - ISAIAS have scheduled her an earlier clinic evaluation tomorrow, Tuesday, 11/26/2019 at 1:40 PM at the [place] location.  Please stressed the [place] location and give her the address if needed.    Quinten Wright MD  11/25/2019 8:24 AM

## 2019-12-05 ENCOUNTER — TELEPHONE (OUTPATIENT)
Dept: RHEUMATOLOGY | Facility: CLINIC | Age: 59
End: 2019-12-05

## 2019-12-05 DIAGNOSIS — E11.9 TYPE 2 DIABETES MELLITUS WITHOUT COMPLICATION, WITHOUT LONG-TERM CURRENT USE OF INSULIN (H): ICD-10-CM

## 2019-12-05 DIAGNOSIS — E78.5 DYSLIPIDEMIA: ICD-10-CM

## 2019-12-05 DIAGNOSIS — M06.9 RHEUMATOID ARTHRITIS, INVOLVING UNSPECIFIED SITE, UNSPECIFIED RHEUMATOID FACTOR PRESENCE: ICD-10-CM

## 2019-12-05 NOTE — LETTER
December 11, 2019    Skylar Bagley  [address]  [place] [address]    Dear Skylar,     We were unable to reach you by phone.    We recently received a refill request for metformin, ibuprofen and atorvastatin.  We were unable to refill this because you are due for a:    Physical office visit and fasting lab appointment      Please schedule this lab appointment 4-5 days prior to the office visit.     Please call at your earliest convenience so that there will not be a delay with your future refills.          Thank you,   Your [facility] Team/  [phone]

## 2019-12-05 NOTE — TELEPHONE ENCOUNTER
Spoke with patient and offered her a sooner appointment to discuss alternative therapies. Patient declined stating she just received a refill of her Humira and is going to give it more time and continue to take it until her follow up apt in February.    Jaron Lala RN....12/5/2019 8:35 AM

## 2019-12-05 NOTE — TELEPHONE ENCOUNTER
Called [facility] pharmacy and advised that patient does not want to switch to Enbrel per phone call this morning with patient. Asked that they disregard this request.    Jaron Lala RN....12/5/2019 2:12 PM

## 2019-12-05 NOTE — TELEPHONE ENCOUNTER
Pharmacy calling looking for script for embrel , have sent in a request and not heard back. Patient is wanting to switch.

## 2019-12-06 RX ORDER — IBUPROFEN 800 MG/1
TABLET, FILM COATED ORAL
Qty: 60 TABLET | Refills: 0 | OUTPATIENT
Start: 2019-12-06

## 2019-12-06 RX ORDER — ATORVASTATIN CALCIUM 40 MG/1
TABLET, FILM COATED ORAL
Qty: 30 TABLET | Refills: 0 | OUTPATIENT
Start: 2019-12-06

## 2019-12-06 NOTE — TELEPHONE ENCOUNTER
Next 5 appointments (look out 90 days)    Feb 03, 2020 10:40 AM CST  Return Visit with Quinten Wright MD  [facility] ([facility] [address]  [place] [address]  [phone]        Routing refill request to provider for review/approval because:  Willa given x1 and patient did not follow up, please advise  Kelsey White RN

## 2019-12-09 ENCOUNTER — TELEPHONE (OUTPATIENT)
Dept: RHEUMATOLOGY | Facility: CLINIC | Age: 59
End: 2019-12-09

## 2019-12-09 DIAGNOSIS — M06.9 RHEUMATOID ARTHRITIS, INVOLVING UNSPECIFIED SITE, UNSPECIFIED RHEUMATOID FACTOR PRESENCE: Primary | ICD-10-CM

## 2019-12-09 NOTE — TELEPHONE ENCOUNTER
Patient states she is having a flare up of RA and would like a prescription for prednisone.    Thank you.

## 2019-12-11 RX ORDER — PREDNISONE 5 MG/1
TABLET ORAL
Qty: 50 TABLET | Refills: 0 | Status: SHIPPED | OUTPATIENT
Start: 2019-12-11 | End: 2020-01-23

## 2019-12-11 NOTE — TELEPHONE ENCOUNTER
Jaron Lala RN: Please call to notify Ms. Bagley that a prescription for prednisone has been sent.  She needs to be evaluated in clinic if her symptoms do not improve within 5 days.    1. Rheumatoid arthritis, involving unspecified site, unspecified rheumatoid factor presence (H)  - predniSONE (DELTASONE) 5 MG tablet; Prednisone 20mg daily x5days, then 15mg daily x5days, then 10mg daily x5days, then 5mg daily x5days, then stop.  Dispense: 50 tablet; Refill: 0      Quinten Wright MD  12/11/2019 3:11 PM

## 2019-12-11 NOTE — TELEPHONE ENCOUNTER
called pt and left VM that med was sent to pharmacy. If s/s do not improve she will need to be seen. Left call back number.    Shannon Anders RN Specialty Triage 12/11/2019 3:23 PM

## 2019-12-11 NOTE — TELEPHONE ENCOUNTER
Patient was last seen in clinic on 8/19/19 for RA  Patient is calling in requesting a course of prednisone  Reports her hands are swollen, crooked, painful, and warm  Symptoms started 1 week ago and are constant   The swelling is severe and causes reduced  strength  It alternates throughout the knuckles of both hands  Has been taking Ibuprofen which provides some relief of pain but not swelling  Continues to take Humira and leflunomide  Patient mentions wanting to switch back to Enbrel but needs to be evaluated in clinic per Dr. Wright's note in 11/22/19 phone encounter  Patient has an apt in February and RN offered her sooner openings however she will only go to  [place] due to transportation   Will discuss getting course of prednisone with Dr. Kyle Lala RN....12/11/2019 11:22 AM

## 2019-12-11 NOTE — TELEPHONE ENCOUNTER
2nd attempt to reach the patient was unsuccesful.  Left message for patient to return call to clinic.    Jaron Lala RN....12/11/2019 11:17 AM

## 2019-12-13 ENCOUNTER — TELEPHONE (OUTPATIENT)
Dept: FAMILY MEDICINE | Facility: CLINIC | Age: 59
End: 2019-12-13

## 2019-12-13 DIAGNOSIS — E11.9 TYPE 2 DIABETES MELLITUS WITHOUT COMPLICATION, WITHOUT LONG-TERM CURRENT USE OF INSULIN (H): ICD-10-CM

## 2019-12-13 DIAGNOSIS — M06.9 RHEUMATOID ARTHRITIS, INVOLVING UNSPECIFIED SITE, UNSPECIFIED RHEUMATOID FACTOR PRESENCE: ICD-10-CM

## 2019-12-13 DIAGNOSIS — J32.9 SINUSITIS, UNSPECIFIED CHRONICITY, UNSPECIFIED LOCATION: Primary | ICD-10-CM

## 2019-12-13 DIAGNOSIS — E78.5 DYSLIPIDEMIA: ICD-10-CM

## 2019-12-13 RX ORDER — IBUPROFEN 800 MG/1
TABLET, FILM COATED ORAL
Qty: 90 TABLET | Refills: 0 | Status: SHIPPED | OUTPATIENT
Start: 2019-12-13 | End: 2020-03-17

## 2019-12-13 RX ORDER — FLUTICASONE PROPIONATE 50 MCG
1 SPRAY, SUSPENSION (ML) NASAL DAILY
Qty: 32 G | Refills: 0 | Status: SHIPPED | OUTPATIENT
Start: 2019-12-13 | End: 2020-03-24

## 2019-12-13 RX ORDER — ATORVASTATIN CALCIUM 40 MG/1
TABLET, FILM COATED ORAL
Qty: 45 TABLET | Refills: 0 | Status: SHIPPED | OUTPATIENT
Start: 2019-12-13 | End: 2020-01-20

## 2019-12-13 NOTE — TELEPHONE ENCOUNTER
Pt already has refills available at pharmacy for gabapentin and levothyroxine.     Pt previously received flonase from an [facility] provider  Medications pended for provider to review and sign if he agrees.  July NAIDUN, RN

## 2019-12-13 NOTE — TELEPHONE ENCOUNTER
Reason for Call:  Medication or medication refill:    Do you use a [facility]?  Name of the pharmacy and phone number for the current request:  [facility] - [place], [place] - [phone]    Name of the medication requested:   Ibuprofen  Metformin  Gabapentin   Flonase  Synthroid  Atorvastatin    Other request: Soonest appt available scheduled with PCP on 2/5/20    Can we leave a detailed message on this number? YES    Phone number patient can be reached at: Home number on file [phone] (home)    Best Time:     Call taken on 12/13/2019 at 9:43 AM by Niya Pfeiffer

## 2019-12-16 ENCOUNTER — TELEPHONE (OUTPATIENT)
Dept: FAMILY MEDICINE | Facility: CLINIC | Age: 59
End: 2019-12-16

## 2019-12-16 NOTE — TELEPHONE ENCOUNTER
Reason for Call:  Other prescription    Detailed comments: would like her medications refilled she can not get in with Dr. Cortez until end of January     metFORMIN (GLUCOPHAGE) 850 MG tablet  ibuprofen (ADVIL/MOTRIN) 800 MG tablet  levothyroxine (SYNTHROID/LEVOTHROID) 100 MCG tablet  fluticasone (FLONASE) 50 MCG/ACT nasal spray  gabapentin (NEURONTIN) 100 MG capsule  atorvastatin (LIPITOR) 40 MG tablet    Phone Number Patient can be reached at: Other phone number:[phone]    Best Time: anytime     Can we leave a detailed message on this number? YES    Call taken on 12/16/2019 at 12:54 PM by Pallavi Nicolas

## 2019-12-16 NOTE — TELEPHONE ENCOUNTER
See previous message, pt advised she needs to contact pharmacy for gabapentin and levothyroxine refills.  Other refills done by Dr. Toñito Cortez..  July NAIDUN, RN

## 2020-01-16 NOTE — PROGRESS NOTES
"Subjective     Skylar Bagley is a 59 year old female who presents to clinic today for the following health issues:    HPI     MAMMO and pap is due, will call back to [facility] appt.    Dizziness  Onset: x 1-2 weeks    Description:   Do you feel faint:  YES  Does it feel like the surroundings (bed, room) are moving: no   Unsteady/off balance: YES  Have you passed out or fallen: no     Intensity: moderate    Progression of Symptoms:  same    Accompanying Signs & Symptoms:  Heart palpitations: no   Nausea, vomiting: YES- nausea and vomiting  Weakness in arms or legs: YES  Fatigue: YES  Vision or speech changes: YES- speech seems a little slow per pt.  Ringing in ears (Tinnitus): YES  Hearing Loss: YES    History:   Head trauma/concussion hx: no   Previous similar symptoms: YES  Recent bleeding history: no     Precipitating factors:   Worse with activity or head movement: YES  Any new medications (BP?): YES- humara per pt x 5 months now, worst the past few 3 months now  Alcohol/drug abuse/withdrawal: no     Alleviating factors:   Does staying in a fixed position give relief:  YES    Therapies Tried and outcome: OTC, Laying down and resting.      Patient is here for cold symptoms and \"feeling like her body is moving in a [place]\". No vision changes.   Vomited for about a week. Mild diarrhea.  Had vertigo since the beginning also.   Cough and cold symptoms started then also.  Wondering about sinus infection.  Has lots of nasal congestion and sinus pressure. Not really any better. She has had them before. Has RA and is on immunosuppressants.   Is holding off her humera.       Tolerates prednisone okay.   Has diabetes, has been well controlled lately.   She requests labs be done today but HAS TO FOLLOW UP WITH HER PCP.     Lab Results   Component Value Date    A1C 5.8 05/15/2019    A1C 5.5 10/15/2018    A1C 5.6 04/20/2018    A1C 8.6 09/15/2007           Patient Active Problem List   Diagnosis     Type 2 diabetes mellitus " without complication, without long-term current use of insulin (H)     Dyslipidemia     Class 1 obesity with serious comorbidity and body mass index (BMI) of 31.0 to 31.9 in adult, unspecified obesity type     Hypothyroidism, unspecified type     Gastroesophageal reflux disease, esophagitis presence not specified     Rheumatoid arthritis, involving unspecified site, unspecified rheumatoid factor presence (H)     Past Surgical History:   Procedure Laterality Date     VAG DELIV ONLY,PREV C-SECTN         Social History     Tobacco Use     Smoking status: Current Every Day Smoker     Packs/day: 0.50     Years: 20.00     Pack years: 10.00     Types: Cigarettes     Smokeless tobacco: Never Used   Substance Use Topics     Alcohol use: No     History reviewed. No pertinent family history.      Current Outpatient Medications   Medication Sig Dispense Refill     ACE/ARB/ARNI NOT PRESCRIBED, INTENTIONAL, Please choose reason not prescribed, below       adalimumab (HUMIRA *CF*) 40 MG/0.4ML pen kit Inject 0.4 mLs (40 mg) Subcutaneous every 14 days . Hold for signs of infection, then seek medical attention. 0.8 mL 4     amoxicillin-clavulanate (AUGMENTIN) 875-125 MG tablet Take 1 tablet by mouth 2 times daily for 10 days With food 20 tablet 0     ASPIRIN NOT PRESCRIBED (INTENTIONAL) Please choose reason not prescribed, below 1 each 0     atorvastatin (LIPITOR) 40 MG tablet TAKE 1 TABLET EVERY DAY (NEED MD APPOINTMENT) 30 tablet 0     blood glucose monitoring (CONTOUR NEXT TEST) test strip TEST 4 TIMES A DAY       calcium carbonate 500 mg-vitamin D 200 units (OSCAL WITH D;OYSTER SHELL CALCIUM) 500-200 MG-UNIT per tablet Take 1 tablet by mouth       clonazePAM (KLONOPIN) 1 MG tablet Take 1 tablet (1 mg) by mouth 2 times daily as needed for anxiety 30 tablet 0     FLUoxetine (PROZAC) 40 MG capsule TAKE 1 CAPSULE (40 MG) BY MOUTH DAILY 30 capsule 0     fluticasone (FLONASE) 50 MCG/ACT nasal spray Spray 1 spray into both nostrils  "daily 32 g 0     gabapentin (NEURONTIN) 100 MG capsule Take 1 capsule (100 mg) by mouth 3 times daily 270 capsule 3     ibuprofen (ADVIL/MOTRIN) 800 MG tablet TAKE 1 TABLET TWICE DAILY AS NEEDED  FOR  MODERATE  PAIN 90 tablet 0     Lancets 30G MISC Test 4 times per day. ICD-10 code E11.9       levothyroxine (SYNTHROID/LEVOTHROID) 100 MCG tablet TAKE 1 TABLET EVERY DAY 90 tablet 3     metFORMIN (GLUCOPHAGE) 850 MG tablet TAKE 1 TABLET TWICE DAILY  WITH  MEALS 60 tablet 0     methylPREDNISolone (MEDROL DOSEPAK) 4 MG tablet therapy pack Follow Package Directions 21 tablet 0     risperiDONE (RISPERDAL) 3 MG tablet Take 1 tablet (3 mg) by mouth 2 times daily 90 tablet 0     traZODone (DESYREL) 100 MG tablet TAKE 2 TABLETS (200 MG) BY MOUTH AT NIGHT AS NEEDED FOR SLEEP   (SUBSTITUTED FOR DESYREL) 180 tablet 0     Allergies   Allergen Reactions     Asa [Aspirin]      Methotrexate Other (See Comments)         Reviewed and updated as needed this visit by Provider         Review of Systems   ROS COMP: Constitutional, HEENT, cardiovascular, pulmonary, GI, , musculoskeletal, neuro, skin, endocrine and psych systems are negative, except as otherwise noted.      Objective    /80   Pulse 103   Temp 98.7  F (37.1  C) (Oral)   Resp 18   Ht 1.562 m (5' 1.5\")   Wt 74.8 kg (165 lb)   SpO2 97%   Breastfeeding No   BMI 30.67 kg/m    Body mass index is 30.67 kg/m .  Physical Exam   GENERAL: healthy, alert and no distress  EYES: Eyes grossly normal to inspection, PERRL and conjunctivae and sclerae normal  HENT: ear canals and TM's normal, nose and mouth without ulcers or lesions  NECK: no adenopathy, no asymmetry, masses, or scars and thyroid normal to palpation  RESP: lungs clear to auscultation - no rales, rhonchi or wheezes  CV: regular rate and rhythm, normal S1 S2, no S3 or S4, no murmur, click or rub, no peripheral edema and peripheral pulses strong  MS: no gross musculoskeletal defects noted, no edema  NEURO: Normal " strength and tone, sensory exam grossly normal, mentation intact, cranial nerves 2-12 intact, DTR's normal and symmetric , gait normal including heel/toe/tandem walking, Romberg normal and rapid alternating movements normal  PSYCH: mentation appears normal, affect normal/bright    Diagnostic Test Results:  Labs reviewed in Epic        Assessment & Plan     1. Acute sinusitis with symptoms > 10 days  Take with food. Side effects discussed.  Call with worsening symptoms or if no improvement in 5 days.  Analgesics for pain with food as needed.    - amoxicillin-clavulanate (AUGMENTIN) 875-125 MG tablet; Take 1 tablet by mouth 2 times daily for 10 days With food  Dispense: 20 tablet; Refill: 0  - methylPREDNISolone (MEDROL DOSEPAK) 4 MG tablet therapy pack; Follow Package Directions  Dispense: 21 tablet; Refill: 0    2. Type 2 diabetes mellitus without complication, without long-term current use of insulin (H)  Not a diabetic check today but we did labs, see below  - Hemoglobin A1c  - TSH with free T4 reflex  - Basic metabolic panel  - Albumin Random Urine Quantitative with Creat Ratio    3. Vertigo  Suspect labrynthiis but could also be menieres, BPPV, acoustic neuroma, or other cause  See below  - methylPREDNISolone (MEDROL DOSEPAK) 4 MG tablet therapy pack; Follow Package Directions  Dispense: 21 tablet; Refill: 0     Tobacco Cessation:   reports that she has been smoking cigarettes. She has a 10.00 pack-year smoking history. She has never used smokeless tobacco.  Tobacco Cessation Action Plan: Information offered: Patient not interested at this time    Patient Instructions   Make appointment with Dr. Cortez for diabetes check    If dizziness worsens go to the emergency room    If it doesn't improve over the next week let me know and I will refer you to neurologist                Return in about 2 weeks (around 2/6/2020) for diabetes recheck.    Rhea Bhatt PA-C  [facility]

## 2020-01-17 DIAGNOSIS — E78.5 DYSLIPIDEMIA: ICD-10-CM

## 2020-01-17 DIAGNOSIS — E11.9 TYPE 2 DIABETES MELLITUS WITHOUT COMPLICATION, WITHOUT LONG-TERM CURRENT USE OF INSULIN (H): ICD-10-CM

## 2020-01-17 DIAGNOSIS — M06.9 RHEUMATOID ARTHRITIS, INVOLVING UNSPECIFIED SITE, UNSPECIFIED RHEUMATOID FACTOR PRESENCE: ICD-10-CM

## 2020-01-20 RX ORDER — IBUPROFEN 800 MG/1
TABLET, FILM COATED ORAL
Qty: 1 TABLET | Refills: 0 | OUTPATIENT
Start: 2020-01-20

## 2020-01-20 RX ORDER — ATORVASTATIN CALCIUM 40 MG/1
TABLET, FILM COATED ORAL
Qty: 30 TABLET | Refills: 0 | Status: SHIPPED | OUTPATIENT
Start: 2020-01-20 | End: 2020-02-05

## 2020-01-20 NOTE — TELEPHONE ENCOUNTER
Pending apts for lab and provider visit.   Ibuprofen refill to provider due to warning and contraindications. Tamica Wilks RN

## 2020-01-23 ENCOUNTER — OFFICE VISIT (OUTPATIENT)
Dept: FAMILY MEDICINE | Facility: CLINIC | Age: 60
End: 2020-01-23
Payer: COMMERCIAL

## 2020-01-23 ENCOUNTER — DOCUMENTATION ONLY (OUTPATIENT)
Dept: FAMILY MEDICINE | Facility: CLINIC | Age: 60
End: 2020-01-23

## 2020-01-23 VITALS
HEART RATE: 103 BPM | RESPIRATION RATE: 18 BRPM | SYSTOLIC BLOOD PRESSURE: 137 MMHG | HEIGHT: 62 IN | BODY MASS INDEX: 30.36 KG/M2 | TEMPERATURE: 98.7 F | OXYGEN SATURATION: 97 % | DIASTOLIC BLOOD PRESSURE: 80 MMHG | WEIGHT: 165 LBS

## 2020-01-23 DIAGNOSIS — R42 VERTIGO: ICD-10-CM

## 2020-01-23 DIAGNOSIS — E11.9 TYPE 2 DIABETES MELLITUS WITHOUT COMPLICATION, WITHOUT LONG-TERM CURRENT USE OF INSULIN (H): ICD-10-CM

## 2020-01-23 DIAGNOSIS — E78.5 DYSLIPIDEMIA: ICD-10-CM

## 2020-01-23 DIAGNOSIS — M06.9 RHEUMATOID ARTHRITIS INVOLVING MULTIPLE SITES, UNSPECIFIED RHEUMATOID FACTOR PRESENCE: ICD-10-CM

## 2020-01-23 DIAGNOSIS — E11.9 TYPE 2 DIABETES MELLITUS WITHOUT COMPLICATION, WITHOUT LONG-TERM CURRENT USE OF INSULIN (H): Primary | ICD-10-CM

## 2020-01-23 DIAGNOSIS — J01.90 ACUTE SINUSITIS WITH SYMPTOMS > 10 DAYS: Primary | ICD-10-CM

## 2020-01-23 LAB
ANION GAP SERPL CALCULATED.3IONS-SCNC: 8 MMOL/L (ref 3–14)
BUN SERPL-MCNC: 13 MG/DL (ref 7–30)
CALCIUM SERPL-MCNC: 9.3 MG/DL (ref 8.5–10.1)
CHLORIDE SERPL-SCNC: 107 MMOL/L (ref 94–109)
CO2 SERPL-SCNC: 21 MMOL/L (ref 20–32)
CREAT SERPL-MCNC: 0.96 MG/DL (ref 0.52–1.04)
CREAT UR-MCNC: 14 MG/DL
GFR SERPL CREATININE-BSD FRML MDRD: 64 ML/MIN/{1.73_M2}
GLUCOSE SERPL-MCNC: 93 MG/DL (ref 70–99)
HBA1C MFR BLD: 5.9 % (ref 0–5.6)
MICROALBUMIN UR-MCNC: <5 MG/L
MICROALBUMIN/CREAT UR: NORMAL MG/G CR (ref 0–25)
POTASSIUM SERPL-SCNC: 3.9 MMOL/L (ref 3.4–5.3)
SODIUM SERPL-SCNC: 136 MMOL/L (ref 133–144)
TSH SERPL DL<=0.005 MIU/L-ACNC: 3.99 MU/L (ref 0.4–4)

## 2020-01-23 PROCEDURE — 80048 BASIC METABOLIC PNL TOTAL CA: CPT | Performed by: PHYSICIAN ASSISTANT

## 2020-01-23 PROCEDURE — 36415 COLL VENOUS BLD VENIPUNCTURE: CPT | Performed by: PHYSICIAN ASSISTANT

## 2020-01-23 PROCEDURE — 99214 OFFICE O/P EST MOD 30 MIN: CPT | Performed by: PHYSICIAN ASSISTANT

## 2020-01-23 PROCEDURE — 83036 HEMOGLOBIN GLYCOSYLATED A1C: CPT | Performed by: PHYSICIAN ASSISTANT

## 2020-01-23 PROCEDURE — 84443 ASSAY THYROID STIM HORMONE: CPT | Performed by: PHYSICIAN ASSISTANT

## 2020-01-23 PROCEDURE — 82043 UR ALBUMIN QUANTITATIVE: CPT | Performed by: PHYSICIAN ASSISTANT

## 2020-01-23 RX ORDER — METHYLPREDNISOLONE 4 MG
TABLET, DOSE PACK ORAL
Qty: 21 TABLET | Refills: 0 | Status: SHIPPED | OUTPATIENT
Start: 2020-01-23 | End: 2020-07-27

## 2020-01-23 ASSESSMENT — MIFFLIN-ST. JEOR: SCORE: 1268.75

## 2020-01-23 NOTE — TELEPHONE ENCOUNTER
Requested Prescriptions   Pending Prescriptions Disp Refills     adalimumab (HUMIRA *CF*) 40 MG/0.4ML pen kit  Last Written Prescription Date:  08/19/19  Last Fill Quantity: 0.8,  # refills: 4   Last office visit: 8/19/2019 with prescribing provider:  IFRAH Wright   Future Office Visit:   Next 5 appointments (look out 90 days)    Jan 23, 2020  2:00 PM CST  SHORT with Rhea Bhatt PA-C  [facility] [address] [facility] [address]  [phone]   Feb 03, 2020 10:40 AM CST  Return Visit with Quinten Wright MD  [facility]) [address] [facility] [address]  [phone]   Feb 05, 2020  3:00 PM CST  Office Visit with Toñito Cortez MD  [facility]) [address] [facility] [address]  [phone]          0.8 mL 4     Sig: Inject 0.4 mLs (40 mg) Subcutaneous every 14 days . Hold for signs of infection, then seek medical attention.       There is no refill protocol information for this order

## 2020-01-23 NOTE — PROGRESS NOTES
Please place or confirm pending lab orders for upcoming lab appointment on 1/29/2020  Thank you,  Aga

## 2020-01-23 NOTE — PATIENT INSTRUCTIONS
Make appointment with Dr. Cortez for diabetes check    If dizziness worsens go to the emergency room    If it doesn't improve over the next week let me know and I will refer you to neurologist

## 2020-01-23 NOTE — PROGRESS NOTES
Please review lab orders sign and close encounter. Yamileth Cueva MA/ASHUTOSH    Diabetes appt 2/5/20

## 2020-01-24 NOTE — TELEPHONE ENCOUNTER
Medication:   humira  Last written on:   8/19/2019  Quantity:   0.8ml    Refills:   4    Last office visit:   8/19/2019  No show:  11/26/2019  Next office visit:   2/3/2020  Last labs:   10/21/2019    Sheila Hough CMA Rheumatology  1/24/2020 7:52 AM

## 2020-01-24 NOTE — RESULT ENCOUNTER NOTE
Dear Skylar,      It was a pleasure to see you at your recent office visit.  Your test results are listed below.  Thyroid is normal. Sodium and potassium normal. Blood sugar (glucose) normal.  Creatinine and GFR normal, which means kidney function is normal.   A1c is to goal at 5.9.  Please discuss these labs at your diabetic checkup with Dr. Cortez        If you have any questions or concerns, please call the clinic at [phone].    Sincerely,  Rhea Bhatt PA-C

## 2020-01-27 NOTE — TELEPHONE ENCOUNTER
Patient cancelled 01/29/20 Labs appointment. Has an appointment Diabetic check  with provider on 02/03/20  ASHUTOSH Fontaine

## 2020-01-28 NOTE — TELEPHONE ENCOUNTER
Patient canceled his lab appointment for 1/29/2020, does have follow up on 2/3/2020 with Dr. Wright.  Sheila Hough [facility] Rheumatology  1/28/2020 9:55 AM

## 2020-01-29 ENCOUNTER — TELEPHONE (OUTPATIENT)
Dept: RHEUMATOLOGY | Facility: CLINIC | Age: 60
End: 2020-01-29

## 2020-01-29 NOTE — TELEPHONE ENCOUNTER
THE RX WE HAVE ON FILE IS EITHER  OF HAS NO REFILLS REMAINING.      [facility] [phone]

## 2020-01-29 NOTE — TELEPHONE ENCOUNTER
Called pt and reached VM, reminder to keep appt for more refills of medication    Shannon Anders RN Specialty Triage 1/29/2020 1:24 PM

## 2020-01-30 ENCOUNTER — TELEPHONE (OUTPATIENT)
Dept: RHEUMATOLOGY | Facility: CLINIC | Age: 60
End: 2020-01-30

## 2020-01-31 NOTE — TELEPHONE ENCOUNTER
Will address at 2/3/2020 appointment. Assess efficacy and for side effects.    Quinten Wright MD  1/31/2020 4:54 PM

## 2020-02-03 DIAGNOSIS — M06.9 RHEUMATOID ARTHRITIS INVOLVING MULTIPLE SITES, UNSPECIFIED RHEUMATOID FACTOR PRESENCE: ICD-10-CM

## 2020-02-03 NOTE — TELEPHONE ENCOUNTER
Requested Prescriptions   Pending Prescriptions Disp Refills     adalimumab (HUMIRA *CF*) 40 MG/0.4ML pen kit 0.8 mL 4     Sig: Inject 0.4 mLs (40 mg) Subcutaneous every 14 days . Hold for signs of infection, then seek medical attention.  Last Written Prescription Date:  ?  Last Fill Quantity: 0.8 ml,  # refills: 4   Last office visit: 8/19/2019 with prescribing provider:  2/3/20 Wright  Future Office Visit:   Next 5 appointments (look out 90 days)    Feb 05, 2020  3:00 PM CST  Office Visit with To[facility]ito Cortez MD  [facility] [address] [facility] [address] [phone]              There is no refill protocol information for this order

## 2020-02-04 ENCOUNTER — TRANSFERRED RECORDS (OUTPATIENT)
Dept: HEALTH INFORMATION MANAGEMENT | Facility: CLINIC | Age: 60
End: 2020-02-04

## 2020-02-04 NOTE — TELEPHONE ENCOUNTER
Please refuse medication.    Left message for patient that she has been scheduled for an appointment for March 3rd, 2020 at 3:40PM with arrival time at 3:30PM. Left phone number if patient has any questions.    Sheila Hough CMA Rheumatology  2/4/2020 11:27 AM

## 2020-02-05 ENCOUNTER — OFFICE VISIT (OUTPATIENT)
Dept: FAMILY MEDICINE | Facility: CLINIC | Age: 60
End: 2020-02-05
Payer: COMMERCIAL

## 2020-02-05 VITALS
SYSTOLIC BLOOD PRESSURE: 122 MMHG | HEART RATE: 106 BPM | WEIGHT: 168 LBS | OXYGEN SATURATION: 97 % | TEMPERATURE: 98.1 F | BODY MASS INDEX: 31.23 KG/M2 | DIASTOLIC BLOOD PRESSURE: 80 MMHG

## 2020-02-05 DIAGNOSIS — E03.9 HYPOTHYROIDISM, UNSPECIFIED TYPE: ICD-10-CM

## 2020-02-05 DIAGNOSIS — E78.5 DYSLIPIDEMIA: ICD-10-CM

## 2020-02-05 DIAGNOSIS — Z23 NEED FOR PROPHYLACTIC VACCINATION AND INOCULATION AGAINST INFLUENZA: ICD-10-CM

## 2020-02-05 DIAGNOSIS — J32.9 SINUSITIS, UNSPECIFIED CHRONICITY, UNSPECIFIED LOCATION: ICD-10-CM

## 2020-02-05 DIAGNOSIS — Z12.31 ENCOUNTER FOR SCREENING MAMMOGRAM FOR BREAST CANCER: ICD-10-CM

## 2020-02-05 DIAGNOSIS — G62.9 PERIPHERAL POLYNEUROPATHY: ICD-10-CM

## 2020-02-05 DIAGNOSIS — E11.9 TYPE 2 DIABETES MELLITUS WITHOUT COMPLICATION, WITHOUT LONG-TERM CURRENT USE OF INSULIN (H): Primary | ICD-10-CM

## 2020-02-05 PROCEDURE — 99214 OFFICE O/P EST MOD 30 MIN: CPT | Mod: 25 | Performed by: FAMILY MEDICINE

## 2020-02-05 PROCEDURE — 99207 C FOOT EXAM  NO CHARGE: CPT | Performed by: FAMILY MEDICINE

## 2020-02-05 PROCEDURE — 90682 RIV4 VACC RECOMBINANT DNA IM: CPT | Performed by: FAMILY MEDICINE

## 2020-02-05 PROCEDURE — 90471 IMMUNIZATION ADMIN: CPT | Performed by: FAMILY MEDICINE

## 2020-02-05 RX ORDER — ATORVASTATIN CALCIUM 40 MG/1
40 TABLET, FILM COATED ORAL DAILY
Qty: 90 TABLET | Refills: 3 | Status: SHIPPED | OUTPATIENT
Start: 2020-02-05

## 2020-02-05 RX ORDER — LEVOTHYROXINE SODIUM 100 UG/1
TABLET ORAL
Qty: 90 TABLET | Refills: 3 | Status: SHIPPED | OUTPATIENT
Start: 2020-02-05

## 2020-02-05 RX ORDER — GABAPENTIN 400 MG/1
400 CAPSULE ORAL 3 TIMES DAILY
Qty: 270 CAPSULE | Refills: 3 | Status: SHIPPED | OUTPATIENT
Start: 2020-02-05 | End: 2020-04-30

## 2020-02-05 NOTE — PROGRESS NOTES
"HPI:    Skylar is a 60 year old female here to discuss:    Non compliance has been an issue with clinic appointments etc -   I tried to motivate her.    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 Slow moving, slow talking. But cognitively just fine.  Evaluation and treatment:    This is usually what I would see with parkinson's.   But I think hers is on the basis of her psychiatric issues and medications used to treat them.    Peripheral neuropathy - since around Oct 2018 she has had \"pins and needles\" in both feet. Sometimes comes up the legs. No focal weakness.   Evaluation and treatment:   B12 level normal at 347 today 1/4/18.   I think this may be related to her diabetes.   Gabapentin 300 mg tid - helps - wants to try 400 mg tid which I approved.    Previous GERD - she used to get heart burn a lot - mostly epigastric pain without any chest or throat burning.   Evaluation and treatment:    When she stopped her Plaquenil, her pain resolved.     RA -   Evaluation and treatment:    Followed previously with rheumatologist. Macy Felipe, [facility].   She is now following with our rheumatologist.     Type 2 diabetes - dx'd around around age 55. Checks glucose about 3 times per day. Results are around  average. Denies symptoms of high or low glucose.   Evaluation and treatment:    Eye exam - per patient fine on 10/16/18. Advised to set up another one.   Foot exam - fine 2/5/20.   Urine albumin - negative 1/23/20.   ASA - allergic - hives   Metformin 850 mg bid - no side effects.   Glyburide stopped due to hypoglycemia.   Continue current tx.   Counseled about the various complications of diabetes, weight loss via diet, exercise, checking glucose levels, self foot check and managing hypoglycemia.    Lab Results   Component Value Date    A1C 5.9 01/23/2020    A1C 5.8 05/15/2019    A1C 5.5 10/15/2018    A1C 5.6 04/20/2018    A1C 8.6 09/15/2007       Obesity -   Evaluation " and treatment:    Diet and exercise discussed.    Body mass index is 31.23 kg/m .  Wt Readings from Last 5 Encounters:   02/05/20 76.2 kg (168 lb)   01/23/20 74.8 kg (165 lb)   08/19/19 72.5 kg (159 lb 12.8 oz)   05/15/19 72.1 kg (159 lb)   04/22/19 71.3 kg (157 lb 3.2 oz)       Dyslipidemia - no h/o vascular disease like CAD, PAD, CVA but has h/o diabetes.   Evaluation and treatment:    Per ATP, moderate to high instensity statin recommended.   Counseling done today regarding healthy weight, diet and exercise.    Lipitor 40 mg every day - no side effects.   Continue same tx.    I don't have current lipids - we can get this at next visit.    Recent Labs   Lab Test 10/15/18  1407 04/20/18   CHOL 130 228*   HDL 55 61   LDL 60 118   TRIG 75 245*          Recent Labs   Lab Test  10/15/18   1407 04/20/18   CHOL  130  228*   HDL  55  61   LDL  60  118   TRIG  75  245*       Hypothyroidism - denies thyroid type symptoms.  Evaluation and treatment:    Synthroid 100 mcg daily (reduced from 112 mcg) - no side effects.   We will continue with same dose for now.     TSH   Date Value Ref Range Status   01/23/2020 3.99 0.40 - 4.00 mU/L Final     T4 Free   Date Value Ref Range Status   05/15/2019 1.09 0.76 - 1.46 ng/dL Final       Depression, anxiety, panic, psychosis -   Evaluation and treatment:    followed with psychiatry but now she is switching - upcoming appointment assessment 5/31/19.    Klonopin 1 mg tid - I refilled short term with warnings.   Trazodone 200 mg at bedtime - I refilled short term.   Prozac 40 mg daily - I refilled short term.   This needs to be managed by psychiatry.    Tobacco abuse - started age 18, average 1 ppd, quit total of 18 years, quit and restarted. Quit again Jan 2019. Restarted around Dec 2019. This gives her about 24 pack year.   Evaluation and treatment:    I commended her for quitting.   Does not qualify for lung CA screen.    Nasal congestion - this is chronic for her.  Evaluation and  treatment:   Does not like Flonase.    She wants to try Claritin D which I approed.    Preventive - I advised Shingrix vaccine from our pharmacy which she did. We gave her flu shot in clinic today.    Immunization History   Administered Date(s) Administered     FLU 6-35 months 01/27/2010     Influenza (High Dose) 3 valent vaccine 10/07/2016     Influenza (IIV3) PF 10/29/2010, 11/17/2011     Influenza Quad, Recombinant, p-free (RIV4) 10/15/2018, 02/05/2020     Influenza Vaccine IM > 6 months Valent IIV4 01/28/2016, 10/07/2016, 10/18/2017     Influenza Vaccine IM Ages 6-35 Months 4 Valent (PF) 10/11/2013     Pneumo Conj 13-V (2010&after) 10/15/2018     Pneumococcal 23 valent 04/19/2016     TD (ADULT, 7+) 09/13/2016     Twinrix A/B 01/27/2010     Zoster vaccine recombinant adjuvanted (SHINGRIX) 02/05/2020     -STD screen: negative 1/4/19.     -Mammogram: ordered previously bu she did not do it - I asked her to get it done.    -PAP: I asked her to come back for this.    No results found for: PAP    - Colon CA screen: declines Colonoscopy. Accepted FIT - negative 10/24/18. This time I ordered cologuard     - Hep C screen: negative 4/22/19    - Advanced Directive: referred previously today - she has life alert    - Lung cancer screen: per HPI      ROS:    Const: No fevers, weight changes or night sweats recently.  ENT: No runny nose, sore throat or ear pain.  Resp: No cough or shortness of breath.  CV: No chest pain, dizziness or cardiac palpitations.  GI: No nausea, vomiting, diarrhea or constipation. Denies blood in stools or black stools.  : No dysuria, frequency or hematuria.  The rest of the ROS negative, other than listed on HPI    SH:    Marital status: , met new partner.  Kids: 1 daughter  Employment: disabled  Exercise: walking 3 times per week  Tobacco: per HPI  Etoh: no  Recreational drugs: no  Caffeine: no    Exam:    /80 (Cuff Size: Adult Regular)   Pulse 106   Temp 98.1  F (36.7  C) (Oral)    Wt 76.2 kg (168 lb)   SpO2 97%   Breastfeeding No   BMI 31.23 kg/m      Gen: 59 year old female in no acute distress. There is psychomotor retardation, baseline.  Neck: No enlarged lymph nodes, thyromegally or other masses.  Lungs: Good air movement and otherwise clear.  CV: Heart RRR with no murmurs. No JVD, carotid bruits or leg edema.  Foot exam: Both feet appear normal by inspection. No calluses or other lesions. DP pluses are normal. The tips of the toes are warm with good capillary refill. Sensation intact per monofilament.      Assessment and Plan - Decision Making    1. Type 2 diabetes mellitus without complication, without long-term current use of insulin (H)    Per HPI    - C FOOT EXAM  NO CHARGE  - metFORMIN (GLUCOPHAGE) 850 MG tablet; Take 1 tablet (850 mg) by mouth 2 times daily (with meals)  Dispense: 180 tablet; Refill: 1    2. Hypothyroidism, unspecified type    Per HPI    - levothyroxine (SYNTHROID/LEVOTHROID) 100 MCG tablet; TAKE 1 TABLET EVERY DAY  Dispense: 90 tablet; Refill: 3    3. Dyslipidemia    Per HPI    - atorvastatin (LIPITOR) 40 MG tablet; Take 1 tablet (40 mg) by mouth daily  Dispense: 90 tablet; Refill: 3    4. Sinusitis, unspecified chronicity, unspecified location    Per HPI    - loratadine-pseudoePHEDrine (CLARITIN-D 12-HOUR) 5-120 MG 12 hr tablet; Take 1 tablet by mouth 2 times daily  Dispense: 60 tablet; Refill: 2    5. Need for prophylactic vaccination and inoculation against influenza    Per HPI    - INFLUENZA QUAD, RECOMBINANT, P-FREE (RIV4) (FLUBLOCK) [74820]  - Vaccine Administration, Initial [34184]    6. Peripheral polyneuropathy    Per HPI    - gabapentin (NEURONTIN) 400 MG capsule; Take 1 capsule (400 mg) by mouth 3 times daily  Dispense: 270 capsule; Refill: 3    7. Encounter for screening mammogram for breast cancer    Per HPI    - *MA Screening Digital Bilateral; Future      Written instructions given as follows:    Patient Instructions   Stop by our pharmacy and  get the Shingles vaccine.    Stop by the  and set up the eye exam.    See you soon for a physical.

## 2020-02-05 NOTE — PATIENT INSTRUCTIONS
Stop by our pharmacy and get the Shingles vaccine.    Stop by the  and set up the eye exam.    See you soon for a physical.

## 2020-02-06 NOTE — TELEPHONE ENCOUNTER
Called and spoke with Shari at [facility] about prescription will not be refilled until patient is seen in clinic for office visit. They will also be calling patient about needing to be seen first.    Please refuse medication.    Sheila Hough CMA Rheumatology  2/6/2020 3:17 PM

## 2020-02-20 DIAGNOSIS — M06.9 RHEUMATOID ARTHRITIS INVOLVING MULTIPLE SITES, UNSPECIFIED RHEUMATOID FACTOR PRESENCE: ICD-10-CM

## 2020-02-20 NOTE — TELEPHONE ENCOUNTER
Requested Prescriptions   Pending Prescriptions Disp Refills     adalimumab 40 MG/0.4ML SC pen kit 0.8 mL 4     Sig: Inject 0.4 mLs (40 mg) Subcutaneous every 14 days . Hold for signs of infection, then seek medical attention.   Last Written Prescription Date:  08/19/19  Last Fill Quantity: 0.8,  # refills: 4   Last office visit: 8/19/2019 with prescribing provider:  IFRAH Wright   Future Office Visit:   Next 5 appointments (look out 90 days)    Mar 03, 2020  3:40 PM CST  (Arrive by 3:30 PM)  Return Visit with Quinten Wright MD  [facility] ([facility]) [address] [facility] [id number] [phone]   Mar 06, 2020  1:20 PM CST  PHYSICAL with Toñito Cortez MD  [facility]) [address] [facility] [address] [phone]             There is no refill protocol information for this order

## 2020-02-20 NOTE — TELEPHONE ENCOUNTER
Patient has an appointment scheduled for 3/3/2020. Could you either refuse or refill please.  Sheila Hough CMA Rheumatology  2/20/2020 1:44 PM

## 2020-02-21 NOTE — TELEPHONE ENCOUNTER
RN: please notify Ms. Bagley that she needs to be evaluated in clinic prior to Humira refills.  Patient last seen 8/19/2019 and Humira was started then.  No show to 11/26/2019 and 2/3/2019 appointments. Has not been seen in clinic since starting Humira.   Needs clinic visit prior to refills.

## 2020-02-21 NOTE — TELEPHONE ENCOUNTER
Patient has an apt on 3/3/20. Called and advised her that we will refill her med at that visit. Patient verbalized understanding.    Jaron Lala RN....2/21/2020 1:38 PM

## 2020-03-06 ENCOUNTER — ANCILLARY PROCEDURE (OUTPATIENT)
Dept: MAMMOGRAPHY | Facility: CLINIC | Age: 60
End: 2020-03-06
Attending: FAMILY MEDICINE
Payer: COMMERCIAL

## 2020-03-06 ENCOUNTER — TELEPHONE (OUTPATIENT)
Dept: FAMILY MEDICINE | Facility: CLINIC | Age: 60
End: 2020-03-06

## 2020-03-06 ENCOUNTER — ANCILLARY PROCEDURE (OUTPATIENT)
Dept: GENERAL RADIOLOGY | Facility: CLINIC | Age: 60
End: 2020-03-06
Attending: FAMILY MEDICINE
Payer: COMMERCIAL

## 2020-03-06 ENCOUNTER — OFFICE VISIT (OUTPATIENT)
Dept: FAMILY MEDICINE | Facility: CLINIC | Age: 60
End: 2020-03-06
Payer: COMMERCIAL

## 2020-03-06 VITALS
DIASTOLIC BLOOD PRESSURE: 67 MMHG | HEIGHT: 62 IN | BODY MASS INDEX: 28.34 KG/M2 | WEIGHT: 154 LBS | OXYGEN SATURATION: 96 % | TEMPERATURE: 98.3 F | HEART RATE: 91 BPM | SYSTOLIC BLOOD PRESSURE: 105 MMHG

## 2020-03-06 DIAGNOSIS — R93.89 ABNORMAL CHEST X-RAY: ICD-10-CM

## 2020-03-06 DIAGNOSIS — Z12.4 SCREENING FOR CERVICAL CANCER: ICD-10-CM

## 2020-03-06 DIAGNOSIS — Z12.31 ENCOUNTER FOR SCREENING MAMMOGRAM FOR BREAST CANCER: ICD-10-CM

## 2020-03-06 DIAGNOSIS — M06.9 RHEUMATOID ARTHRITIS INVOLVING MULTIPLE SITES, UNSPECIFIED RHEUMATOID FACTOR PRESENCE: ICD-10-CM

## 2020-03-06 DIAGNOSIS — R05.9 COUGH: ICD-10-CM

## 2020-03-06 DIAGNOSIS — Z00.00 ROUTINE GENERAL MEDICAL EXAMINATION AT A HEALTH CARE FACILITY: Primary | ICD-10-CM

## 2020-03-06 DIAGNOSIS — E78.5 DYSLIPIDEMIA: ICD-10-CM

## 2020-03-06 LAB
CHOLEST SERPL-MCNC: 124 MG/DL
HDLC SERPL-MCNC: 29 MG/DL
LDLC SERPL CALC-MCNC: 70 MG/DL
NONHDLC SERPL-MCNC: 95 MG/DL
TRIGL SERPL-MCNC: 125 MG/DL

## 2020-03-06 PROCEDURE — 36415 COLL VENOUS BLD VENIPUNCTURE: CPT | Performed by: FAMILY MEDICINE

## 2020-03-06 PROCEDURE — 77067 SCR MAMMO BI INCL CAD: CPT | Mod: TC

## 2020-03-06 PROCEDURE — G0145 SCR C/V CYTO,THINLAYER,RESCR: HCPCS | Performed by: FAMILY MEDICINE

## 2020-03-06 PROCEDURE — 77063 BREAST TOMOSYNTHESIS BI: CPT | Mod: TC

## 2020-03-06 PROCEDURE — 87624 HPV HI-RISK TYP POOLED RSLT: CPT | Performed by: FAMILY MEDICINE

## 2020-03-06 PROCEDURE — 71046 X-RAY EXAM CHEST 2 VIEWS: CPT

## 2020-03-06 PROCEDURE — 99396 PREV VISIT EST AGE 40-64: CPT | Performed by: FAMILY MEDICINE

## 2020-03-06 PROCEDURE — G0476 HPV COMBO ASSAY CA SCREEN: HCPCS | Performed by: FAMILY MEDICINE

## 2020-03-06 PROCEDURE — 80061 LIPID PANEL: CPT | Performed by: FAMILY MEDICINE

## 2020-03-06 PROCEDURE — 99213 OFFICE O/P EST LOW 20 MIN: CPT | Mod: 25 | Performed by: FAMILY MEDICINE

## 2020-03-06 RX ORDER — AZITHROMYCIN 250 MG/1
TABLET, FILM COATED ORAL
Qty: 6 TABLET | Refills: 0 | Status: SHIPPED | OUTPATIENT
Start: 2020-03-06

## 2020-03-06 ASSESSMENT — MIFFLIN-ST. JEOR: SCORE: 1213.85

## 2020-03-06 NOTE — TELEPHONE ENCOUNTER
Requested Prescriptions   Pending Prescriptions Disp Refills     adalimumab (HUMIRA *CF*) 40 MG/0.4ML pen kit 0.8 mL 4     Sig: Inject 0.4 mLs (40 mg) Subcutaneous every 14 days . Hold for signs of infection, then seek medical attention.  Last Written Prescription Date:  2/20/20  Last Fill Quantity: 0.8 ml,  # refills: 4   Last office visit: 8/19/2019 with prescribing provider:  Kyle/ 2/5/20 [place]   Future Office Visit:   Next 5 appointments (look out 90 days)    Mar 06, 2020  1:20 PM CST  PHYSICAL with Toñito Cortez MD  [facility]) [address] [facility] [id number] [phone]   Mar 09, 2020  1:20 PM CDT  [facility] Visit with Quinten [facility]  [facility] [address] [facility] [id number] [phone]              There is no refill protocol information for this order

## 2020-03-06 NOTE — PROGRESS NOTES
SUBJECTIVE:   CC: Skylar Bagley is an 60 year old woman who presents for preventive health visit.     Non compliance has been an issue with clinic appointments etc -   I continue to try to motivate her.    Abnormal CXR - at the end of our visit today, she mentioned that she had a cough for the last 2-3 months. This is not productive. She has baseline nasal congestion. She denies chest pain, shortness of breath, fevers, chills, night sweats. She has lost some weight recently.  Evaluation and treatment:   CXR today as below.   I called her a few times at her cell and home numbers but there was no answer.   I left message saying I sent rx for Zithromax and that I ordered CT for her. I will follow up on this to make sure she gets this done.     Results for orders placed or performed in visit on 03/06/20   XR Chest 2 Views     Status: None    Narrative    CHEST TWO VIEWS  3/6/2020 2:18 PM     HISTORY: 60-year-old woman with history of cough.    COMPARISON: None       Impression    IMPRESSION: Heart size is normal. Ovoid opacity in the right perihilar  location is identified on PA radiograph measuring up to 5.9 cm.  Suspect this is in the superior segment of right lower lobe on lateral  radiograph and may represent pneumonia. However, underlying mass could  not be excluded given ovoid shape. Therefore, recommend close  radiographic surveillance until resolution. Similarly, ovoid left  lower lobe retrocardiac well-defined opacity measures up to 7.4 x 4.1  cm. Also, this could represent pneumonia, though underlying mass is  possible. Both require close follow-up. Granuloma in the right lung is  incidentally noted.    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 Slow moving, slow talking. But cognitively just fine.  Evaluation and treatment:    This is usually what I would see with parkinson's.   But I think hers is on the basis of her psychiatric issues and medications used to treat  "them.    Peripheral neuropathy - since around Oct 2018 she has had \"pins and needles\" in both feet. Sometimes comes up the legs. No focal weakness.   Evaluation and treatment:   B12 level normal at 347 today 1/4/18.   I think this may be related to her diabetes.   Gabapentin 400 mg tid - helps.    Previous GERD - she used to get heart burn a lot - mostly epigastric pain without any chest or throat burning.   Evaluation and treatment:    When she stopped her Plaquenil, her pain resolved.     RA -   Evaluation and treatment:    Followed previously with rheumatologist. Macy Felipe, [facility].   She is now following with our rheumatologist.     Type 2 diabetes - dx'd around around age 55. Checks glucose about 3 times per day. Results are around  average. Denies symptoms of high or low glucose.   Evaluation and treatment:    Eye exam - per patient fine on 10/16/18. Advised to set up another one.   Foot exam - fine 2/5/20.   Urine albumin - negative 1/23/20.   ASA - allergic - hives   Metformin 850 mg bid - no side effects.   Glyburide stopped due to hypoglycemia.   Continue current tx.   Counseled about the various complications of diabetes, weight loss via diet, exercise, checking glucose levels, self foot check and managing hypoglycemia.    Lab Results   Component Value Date    A1C 5.9 01/23/2020    A1C 5.8 05/15/2019    A1C 5.5 10/15/2018    A1C 5.6 04/20/2018    A1C 8.6 09/15/2007     Last Comprehensive Metabolic Panel:  Sodium   Date Value Ref Range Status   01/23/2020 136 133 - 144 mmol/L Final     Potassium   Date Value Ref Range Status   01/23/2020 3.9 3.4 - 5.3 mmol/L Final     Chloride   Date Value Ref Range Status   01/23/2020 107 94 - 109 mmol/L Final     Carbon Dioxide   Date Value Ref Range Status   01/23/2020 21 20 - 32 mmol/L Final     Anion Gap   Date Value Ref Range Status   01/23/2020 8 3 - 14 mmol/L Final     Glucose   Date Value Ref Range Status   01/23/2020 " 93 70 - 99 mg/dL Final     Comment:     Non Fasting     Urea Nitrogen   Date Value Ref Range Status   01/23/2020 13 7 - 30 mg/dL Final     Creatinine   Date Value Ref Range Status   01/23/2020 0.96 0.52 - 1.04 mg/dL Final     GFR Estimate   Date Value Ref Range Status   01/23/2020 64 >60 mL/min/[1.73_m2] Final     Comment:     Non  GFR Calc  Starting 12/18/2018, serum creatinine based estimated GFR (eGFR) will be   calculated using the Chronic Kidney Disease Epidemiology Collaboration   (CKD-EPI) equation.       Calcium   Date Value Ref Range Status   01/23/2020 9.3 8.5 - 10.1 mg/dL Final     Overweight (previously Obesity) -   Evaluation and treatment:    Diet and exercise discussed.    Body mass index is 28.63 kg/m .  Wt Readings from Last 5 Encounters:   03/06/20 69.9 kg (154 lb)   02/05/20 76.2 kg (168 lb)   01/23/20 74.8 kg (165 lb)   08/19/19 72.5 kg (159 lb 12.8 oz)   05/15/19 72.1 kg (159 lb)     Dyslipidemia - no h/o vascular disease like CAD, PAD, CVA but has h/o diabetes.   Evaluation and treatment:    Per ATP, moderate to high instensity statin recommended.   Counseling done today regarding healthy weight, diet and exercise.    Lipitor 40 mg every day - no side effects.   Continue same tx.     Recent Labs   Lab Test 03/06/20  1416 10/15/18  1407   CHOL 124 130   HDL 29* 55   LDL 70 60   TRIG 125 75     Lab Results   Component Value Date    ALT 37 10/21/2019     Hypothyroidism - denies thyroid type symptoms.  Evaluation and treatment:    Synthroid 100 mcg daily (reduced from 112 mcg) - no side effects.   We will continue with same dose for now.     TSH   Date Value Ref Range Status   01/23/2020 3.99 0.40 - 4.00 mU/L Final     T4 Free   Date Value Ref Range Status   05/15/2019 1.09 0.76 - 1.46 ng/dL Final     Depression, anxiety, panic, psychosis -   Evaluation and treatment:    followed with psychiatry but now she is switching - upcoming appointment assessment 5/31/19.    Klonopin 1 mg tid  - I refilled short term with warnings.   Trazodone 200 mg at bedtime - I refilled short term.   Prozac 40 mg daily - I refilled short term.   This needs to be managed by psychiatry.    Tobacco abuse - started age 18, average 1 ppd, quit total of 18 years, quit and restarted. Quit again Jan 2019. Restarted around Dec 2019. This gives her about 24 pack year.   Evaluation and treatment:    Counseled and offered help to quit smoking - she declined at this time.   Does not qualify for lung CA screen - however given abnormal CXR, I ordered chest CT - see above.    Nasal congestion - this is chronic for her.  Evaluation and treatment:   Does not like Flonase.    She wants to try Claritin D which I approed.    Preventive -     Immunization History   Administered Date(s) Administered     FLU 6-35 months 01/27/2010     Influenza (High Dose) 3 valent vaccine 10/07/2016     Influenza (IIV3) PF 10/29/2010, 11/17/2011     Influenza Quad, Recombinant, p-free (RIV4) 10/15/2018, 02/05/2020     Influenza Vaccine IM > 6 months Valent IIV4 01/28/2016, 10/07/2016, 10/18/2017     Influenza Vaccine IM Ages 6-35 Months 4 Valent (PF) 10/11/2013     Pneumo Conj 13-V (2010&after) 10/15/2018     Pneumococcal 23 valent 04/19/2016     TD (ADULT, 7+) 09/13/2016     Twinrix A/B 01/27/2010     Zoster vaccine recombinant adjuvanted (SHINGRIX) 02/05/2020     -STD screen: negative 1/4/19.     -Mammogram: was  Done 3/6/20 - result pending.     -PAP: collected today. The cervix was stenotic and would not allow the brush in the endocervix.    - Colon CA screen: declines Colonoscopy. Accepted FIT - negative 10/24/18. I previously ordered cologuard - but she says her dog ate it or something. I ordered another one for her.     - Hep C screen: negative 4/22/19    - Advanced Directive: referred previously and today - she has life alert    - Lung cancer screen: per HPI    SH:    Marital status: , met new partner.  Kids: 1 daughter  Employment:  "disabled  Exercise: walking 3 times per week  Tobacco: per HPI  Etoh: no  Recreational drugs: no  Caffeine: no    Today's PHQ-2 Score:   PHQ-2 ( 1999 Pfizer) 1/23/2020 5/15/2019   Q1: Little interest or pleasure in doing things 0 0   Q2: Feeling down, depressed or hopeless 0 0   PHQ-2 Score 0 0         Social History     Tobacco Use     Smoking status: Current Every Day Smoker     Packs/day: 0.50     Years: 20.00     Pack years: 10.00     Types: Cigarettes     Smokeless tobacco: Never Used   Substance Use Topics     Alcohol use: No     If you drink alcohol do you typically have >3 drinks per day or >7 drinks per week? No                     Reviewed orders with patient.  Reviewed health maintenance and updated orders accordingly - Yes          Pertinent mammograms are reviewed under the imaging tab.  History of abnormal Pap smear:      Reviewed and updated as needed this visit by clinical staff  Tobacco  Allergies  Meds  Med Hx  Surg Hx  Fam Hx  Soc Hx        Reviewed and updated as needed this visit by Provider            ROS:  CONSTITUTIONAL: NEGATIVE for fever, chills, change in weight  INTEGUMENTARY/SKIN: NEGATIVE for worrisome rashes, moles or lesions  EYES: NEGATIVE for vision changes or irritation  ENT: NEGATIVE for ear, mouth and throat problems  RESP: per HPI  BREAST: NEGATIVE for masses, tenderness or discharge  CV: NEGATIVE for chest pain, palpitations or peripheral edema  GI: NEGATIVE for nausea, abdominal pain, heartburn, or change in bowel habits  : NEGATIVE for unusual urinary or vaginal symptoms. No vaginal bleeding.  MUSCULOSKELETAL: NEGATIVE for significant arthralgias or myalgia  NEURO: NEGATIVE for weakness, dizziness or paresthesias  PSYCHIATRIC: per HPI    OBJECTIVE:   /67 (Cuff Size: Adult Regular)   Pulse 91   Temp 98.3  F (36.8  C) (Oral)   Ht 1.562 m (5' 1.5\")   Wt 69.9 kg (154 lb)   SpO2 96%   Breastfeeding No   BMI 28.63 kg/m    EXAM:  GENERAL: healthy, alert and no " "distress  EYES: Eyes grossly normal to inspection, PERRL and conjunctivae and sclerae normal  HENT: ear canals and TM's normal, nose and mouth without ulcers or lesions  NECK: no adenopathy, no asymmetry, masses, or scars and thyroid normal to palpation  RESP: good air movement with rhonchi. O2 sat noted. No tachypnea or other signs of respiratory distress.  CV: regular rate and rhythm, normal S1 S2, no S3 or S4, no murmur, click or rub, no peripheral edema and peripheral pulses strong  ABDOMEN: soft, nontender, no hepatosplenomegaly, no masses and bowel sounds normal  MS: no gross musculoskeletal defects noted, no edema  SKIN: no suspicious lesions or rashes  NEURO: Normal strength and tone, mentation intact and speech normal  PSYCH: psychomotor retardation at baseline.    The following exams were done in the presence of Courtney Pierre LPN.  BREAST: normal without masses, tenderness or nipple discharge and no palpable axillary masses or adenopathy  : normal female external genitalia, vaginal mucosa pink, moist, not rugated and cervix stenotic, adnexae, and uterus without masses. Normal vaginal discharge    ASSESSMENT/PLAN:       COUNSELING:   Reviewed preventive health counseling, as reflected in patient instructions       Regular exercise       Healthy diet/nutrition    Estimated body mass index is 28.63 kg/m  as calculated from the following:    Height as of this encounter: 1.562 m (5' 1.5\").    Weight as of this encounter: 69.9 kg (154 lb).    Weight management plan: Discussed healthy diet and exercise guidelines     reports that she has been smoking cigarettes. She has a 10.00 pack-year smoking history. She has never used smokeless tobacco.  Tobacco Cessation Action Plan: Information offered: Patient not interested at this time    Assessment and Plan - Decision Making    1. Routine general medical examination at a health care facility    Results of today's exam given to patient verbally along with age " appropriate preventive measures. Written instructions reviewed and handed to the patient.      2. Screening for cervical cancer    Per HPI    - Pap imaged thin layer screen with HPV - recommended age 30 - 65 years (select HPV order below)  - HPV High Risk Types DNA Cervical    3. Dyslipidemia    Per HPI    - Lipid panel reflex to direct LDL Non-fasting    4. Cough    Per HPI    - XR Chest 2 Views  - azithromycin (ZITHROMAX) 250 MG tablet; Two tablets first day, then one tablet daily for four days.  Dispense: 6 tablet; Refill: 0  - CT Chest w/o Contrast; Future    5. Abnormal chest x-ray    Per HPI    - azithromycin (ZITHROMAX) 250 MG tablet; Two tablets first day, then one tablet daily for four days.  Dispense: 6 tablet; Refill: 0  - CT Chest w/o Contrast; Future      Written instructions given as follows:    Patient Instructions   1. I recommend including veggies, fresh fruits (3 to 5 servings), nuts (unsalted) in your daily diet. Cut down on carbs like bread, pasta, rice, potatoes. Cut down on red meats like burgers etc. Increase healthy proteins like beans, tofu, tuna, chicken and turkey.    2. Get regular exercise like walking, biking, swimming at least 3 times per week (150 minutes per week).      3. Do self breast exams monthly. Report any lumps.    4. Avoid the sun or otherwise use sun screen - please look at the guide I gave you about melanoma.    5. See the dentist and eye doctor regularly.     6. Please call [phone] to register for a class about advanced directive.     7. If all is well, see you in 6 months for follow-up with fasting labs.

## 2020-03-06 NOTE — TELEPHONE ENCOUNTER
Reason for Call:  Medication or medication refill:    Do you use a [facility]?  Name of the pharmacy and phone number for the current request:  [facility] - [place], [place] - [phone]    Name of the medication requested: Diapers    Other request:     Can we leave a detailed message on this number? YES    Phone number patient can be reached at: Home number on file [phone] (home)    Best Time:     Call taken on 3/6/2020 at 4:30 PM by Niya Pfeiffer

## 2020-03-06 NOTE — LETTER
March 18, 2020    Skylar Bagley  [address]  [facility] [address]    Dear ,  This letter is regarding your recent Pap smear (cervical cancer screening) and Human Papillomavirus (HPV) test.  We are happy to inform you that your Pap smear result is normal. Cervical cancer is closely linked with certain types of HPV. Your results showed no evidence of high-risk HPV.  We recommend you have your next PAP smear and HPV test in 5 years.  You will still need to return to the clinic every year for an annual exam and other preventive tests.  If you have additional questions regarding this result, please call our registered nurse, Sandy at [phone].  Sincerely,    ISMAEL FRIEDMAN MD/chip

## 2020-03-06 NOTE — PATIENT INSTRUCTIONS
1. I recommend including veggies, fresh fruits (3 to 5 servings), nuts (unsalted) in your daily diet. Cut down on carbs like bread, pasta, rice, potatoes. Cut down on red meats like burgers etc. Increase healthy proteins like beans, tofu, tuna, chicken and turkey.    2. Get regular exercise like walking, biking, swimming at least 3 times per week (150 minutes per week).      3. Do self breast exams monthly. Report any lumps.    4. Avoid the sun or otherwise use sun screen - please look at the guide I gave you about melanoma.    5. See the dentist and eye doctor regularly.     6. Please call [phone] to register for a class about advanced directive.     7. If all is well, see you in 6 months for follow-up with fasting labs.

## 2020-03-09 ENCOUNTER — OFFICE VISIT (OUTPATIENT)
Dept: RHEUMATOLOGY | Facility: CLINIC | Age: 60
End: 2020-03-09
Payer: COMMERCIAL

## 2020-03-09 ENCOUNTER — TELEPHONE (OUTPATIENT)
Dept: FAMILY MEDICINE | Facility: CLINIC | Age: 60
End: 2020-03-09

## 2020-03-09 VITALS
BODY MASS INDEX: 28.34 KG/M2 | HEIGHT: 62 IN | SYSTOLIC BLOOD PRESSURE: 109 MMHG | HEART RATE: 91 BPM | OXYGEN SATURATION: 93 % | WEIGHT: 154 LBS | DIASTOLIC BLOOD PRESSURE: 69 MMHG

## 2020-03-09 DIAGNOSIS — M05.79 RHEUMATOID ARTHRITIS INVOLVING MULTIPLE SITES WITH POSITIVE RHEUMATOID FACTOR (H): Primary | ICD-10-CM

## 2020-03-09 DIAGNOSIS — Z79.899 HIGH RISK MEDICATION USE: ICD-10-CM

## 2020-03-09 LAB
ALBUMIN SERPL-MCNC: 2.8 G/DL (ref 3.4–5)
ALP SERPL-CCNC: 99 U/L (ref 40–150)
ALT SERPL W P-5'-P-CCNC: 27 U/L (ref 0–50)
AST SERPL W P-5'-P-CCNC: 25 U/L (ref 0–45)
BASOPHILS # BLD AUTO: 0 10E9/L (ref 0–0.2)
BASOPHILS NFR BLD AUTO: 0.2 %
BILIRUB DIRECT SERPL-MCNC: 0.1 MG/DL (ref 0–0.2)
BILIRUB SERPL-MCNC: 0.2 MG/DL (ref 0.2–1.3)
CREAT SERPL-MCNC: 1.05 MG/DL (ref 0.52–1.04)
CRP SERPL-MCNC: 167 MG/L (ref 0–8)
DIFFERENTIAL METHOD BLD: ABNORMAL
EOSINOPHIL # BLD AUTO: 0.1 10E9/L (ref 0–0.7)
EOSINOPHIL NFR BLD AUTO: 0.6 %
ERYTHROCYTE [DISTWIDTH] IN BLOOD BY AUTOMATED COUNT: 13.5 % (ref 10–15)
ERYTHROCYTE [SEDIMENTATION RATE] IN BLOOD BY WESTERGREN METHOD: 102 MM/H (ref 0–30)
GFR SERPL CREATININE-BSD FRML MDRD: 58 ML/MIN/{1.73_M2}
HCT VFR BLD AUTO: 34.2 % (ref 35–47)
HGB BLD-MCNC: 10.9 G/DL (ref 11.7–15.7)
LYMPHOCYTES # BLD AUTO: 2.2 10E9/L (ref 0.8–5.3)
LYMPHOCYTES NFR BLD AUTO: 16 %
MCH RBC QN AUTO: 29.3 PG (ref 26.5–33)
MCHC RBC AUTO-ENTMCNC: 31.9 G/DL (ref 31.5–36.5)
MCV RBC AUTO: 92 FL (ref 78–100)
MONOCYTES # BLD AUTO: 1.4 10E9/L (ref 0–1.3)
MONOCYTES NFR BLD AUTO: 10.1 %
NEUTROPHILS # BLD AUTO: 10 10E9/L (ref 1.6–8.3)
NEUTROPHILS NFR BLD AUTO: 73.1 %
PLATELET # BLD AUTO: 342 10E9/L (ref 150–450)
PROT SERPL-MCNC: 8.1 G/DL (ref 6.8–8.8)
RBC # BLD AUTO: 3.72 10E12/L (ref 3.8–5.2)
WBC # BLD AUTO: 13.6 10E9/L (ref 4–11)

## 2020-03-09 PROCEDURE — 85025 COMPLETE CBC W/AUTO DIFF WBC: CPT | Performed by: INTERNAL MEDICINE

## 2020-03-09 PROCEDURE — 82565 ASSAY OF CREATININE: CPT | Performed by: INTERNAL MEDICINE

## 2020-03-09 PROCEDURE — 36415 COLL VENOUS BLD VENIPUNCTURE: CPT | Performed by: INTERNAL MEDICINE

## 2020-03-09 PROCEDURE — 85652 RBC SED RATE AUTOMATED: CPT | Performed by: INTERNAL MEDICINE

## 2020-03-09 PROCEDURE — 86140 C-REACTIVE PROTEIN: CPT | Performed by: INTERNAL MEDICINE

## 2020-03-09 PROCEDURE — 99214 OFFICE O/P EST MOD 30 MIN: CPT | Performed by: INTERNAL MEDICINE

## 2020-03-09 PROCEDURE — 80076 HEPATIC FUNCTION PANEL: CPT | Performed by: INTERNAL MEDICINE

## 2020-03-09 RX ORDER — ABATACEPT 125 MG/ML
125 INJECTION, SOLUTION SUBCUTANEOUS
Qty: 4 SYRINGE | Refills: 3 | Status: SHIPPED | OUTPATIENT
Start: 2020-03-09 | End: 2020-06-28

## 2020-03-09 ASSESSMENT — MIFFLIN-ST. JEOR: SCORE: 1213.85

## 2020-03-09 NOTE — TELEPHONE ENCOUNTER
On Friday, I left her a message saying the CXR was abnormal and that I sent rx for Zithromax and ordered CT.    Please call her to make sure she got it as I don't see upcoming CT appointment - she should call [phone] to set that up. This is very important!    She should start the Zpak if she has not already.    Also let her know her cholesterol was fine.    Thanks.    Toñito Cortez M.D.

## 2020-03-09 NOTE — TELEPHONE ENCOUNTER
Left message on answering machine for patient to call back to [phone].  Is this for depends?  How many does she use a day?  What does she use them for, we need dx?   July NAIDUN, RN

## 2020-03-09 NOTE — PROGRESS NOTES
Rheumatology Clinic Visit      Skylar Bagley MRN# [id number]   YOB: 1960 Age: 60 year old      Date of visit: 3/09/20   PCP: Dr. Toñito Cortez    Chief Complaint   Patient presents with:  RECHECK: f/u      Assessment and Plan     1.  Rheumatoid arthritis (, CCP >340): Reportedly diagnosed prior to 1995.  Established care with me on 4/22/2019.  Previously treated with methotrexate (LFT elevation), hydroxychloroquine (retinal toxicity), Enbrel (partially effective), leflunomide (stopped by patient due to reported associated alopecia).  Mild synovitis on exam today.  Clinic and toxicity monitoring nonadherence is an issue.  Discussed tx options.  Reportedly recurrent infections on Humira so will stop.  Discussed orencia and she will start this after her PCP clears her of infection as she says she is still dealing with a mild respiratory illness. Advised having x-rays done today; she refuses but will consider at a later time.   - Discontinue Humira 40mg SQ every 14 days  - Discontinued prior to this visit: leflunomide 20mg daily  - Start Orencia 125mg SQ every 7 days  - Labs today: CBC, Creatinine, Hepatic Panel, ESR, CRP  - Labs in 3 months: CBC, Creatinine, Hepatic Panel, ESR, CRP    # Abatacept (Orencia) Risks and Benefits: The risks and benefits of abatacept were discussed in detail and the patient verbalized understanding.  The risks discussed include, but are not limited to, the risk for hypersensitivity, anaphylaxis, anaphylactoid reactions, an increased risk for serious infections leading to hospitalization or death, and an increased risk for more frequent respiratory adverse events in patients with COPD.  If subcutaneous injections are used, then injection site reactions and/or pain may occur at the site of injection.  The most common side effects were discussed that include headache, upper respiratory tract infections, nasopharyngitis, and nausea.  It was discussed that the medication  would need to be discontinued if a serious infection develops.  It was discussed that live vaccinations should not be received while using abatacept or within 30 days prior to starting abatacept.  I encouraged reviewing the package insert and asking any questions about the medication.      2. Fibromyalgia: management per PCP    3. Primary osteoarthritis of both hands: evidenced by Heberden's and Yasmin's nodes.      4. Cigarette Smoking:  Encouraged complete cessation and discussed the health benefits.      5.  Osteoporosis screening: Risk factors include postmenopausal status, rheumatoid arthritis, cigarette smoking.  Check DEXA  - DEXA ordered; patient was advised to call to schedule again    6. Hepatitis B core ab positive: PCR negative.     I explained that to safely continue medications, clinic follow up as recommended and labs as recommended are needed.  If clinic follow up and labs are not completed as directed then it will be up to the discretion of the prescribing provider to determine if a refill will be given.  If it is determined that a refill will not be given based on insufficient monitoring, such as having not been seen back in the rheumatology clinic for evaluation, then it is reasonable to discuss with your primary care provider where monitoring and refills may be considered.  I also explained that all patients followed in the rheumatology clinic need to have a primary care provider.     Ms. Bagley verbalized agreement with and understanding of the rational for the diagnosis and treatment plan.  All questions were answered to best of my ability and the patient's satisfaction. Ms. Bagley was advised to contact the clinic with any questions that may arise after the clinic visit.      Thank you for involving me in the care of the patient    Return to clinic: 3 months      HPI   Skylar Bagley is a 60 year old female with a past medical history significant for diabetes, GERD, hypothyroidism,  hyperlipidemia, anxiety, depression, and RA who is seen for follow-up of rheumatoid arthritis.    Last evaluated in clinic on 8/19/2019    No-show to scheduled appointment on 11/26/2019    No-show to scheduled appointment on 2/3/2020    Patient canceled appointment on 3/3/2020    Today, 3/9/2020: Ms. Bagley reported that Humira caused a lot of infections - sinus, lung, eye, etc she says.  Has been tx'd with abx once she says.  Humira was last taken about 1 week ago but has missed many doses because of infections.  Less infections when on Enbrel and she says that she prefers Enbrel.  Arms, feet, hands, ankles, knees; worse all day long she says and doesn't do activity she says. Stopped Leflunomide because her hair was falling out; says that she took vitamins and the hair thinning resolved     Denies fevers, chills, nausea, or vomiting.  Chronic constipation.  No diarrhea.   No abdominal pain. No chest pain/pressure, palpitations, or shortness of breath. No LE swelling. No neck pain. No oral or nasal sores.  No rash. No sicca symptoms. No photosensitivity or photophobia. No eye pain or redness. No history of inflammatory eye disease.  No history of inflammatory bowel disease.  No history of DVT, pulmonary embolism, or miscarriage.   No history of serositis.        Tobacco: Cigarette smoking  EtOH: None  Drugs: None  Occupation:  Reportedly on disability because of rheumatoid arthritis    ROS   GEN: No fevers, chills, night sweats, or weight change  SKIN: No itching, rashes, sores  HEENT: No epistaxis. No oral or nasal ulcers.  CV: No chest pain, pressure, palpitations, or dyspnea on exertion.  PULM: No SOB, wheeze, cough.  GI: See HPI.  No blood in stool  : No blood in urine.  MSK: See HPI.  NEURO: No numbness or tingling  ENDO: No heat/cold intolerance.   EXT: No LE swelling  PSYCH: See HPI    Active Problem List     Patient Active Problem List   Diagnosis     Type 2 diabetes mellitus without complication,  without long-term current use of insulin (H)     Dyslipidemia     Class 1 obesity with serious comorbidity and body mass index (BMI) of 31.0 to 31.9 in adult, unspecified obesity type     Hypothyroidism, unspecified type     Gastroesophageal reflux disease, esophagitis presence not specified     Rheumatoid arthritis, involving unspecified site, unspecified rheumatoid factor presence (H)     Past Medical History     Past Medical History:   Diagnosis Date     Arthritis      Depressive disorder      Diabetes (H)      Thyroid disease      Past Surgical History     Past Surgical History:   Procedure Laterality Date     VAG DELIV ONLY,PREV C-SECTN       Allergy     Allergies   Allergen Reactions     Asa [Aspirin]      Methotrexate Other (See Comments)     Current Medication List     Current Outpatient Medications   Medication Sig     ACE/ARB/ARNI NOT PRESCRIBED, INTENTIONAL, Please choose reason not prescribed, below     adalimumab (HUMIRA *CF*) 40 MG/0.4ML pen kit Inject 0.4 mLs (40 mg) Subcutaneous every 14 days . Hold for signs of infection, then seek medical attention.     ASPIRIN NOT PRESCRIBED (INTENTIONAL) Please choose reason not prescribed, below     atorvastatin (LIPITOR) 40 MG tablet Take 1 tablet (40 mg) by mouth daily     azithromycin (ZITHROMAX) 250 MG tablet Two tablets first day, then one tablet daily for four days.     blood glucose monitoring (CONTOUR NEXT TEST) test strip TEST 4 TIMES A DAY     calcium carbonate 500 mg-vitamin D 200 units (OSCAL WITH D;OYSTER SHELL CALCIUM) 500-200 MG-UNIT per tablet Take 1 tablet by mouth     clonazePAM (KLONOPIN) 1 MG tablet Take 1 tablet (1 mg) by mouth 2 times daily as needed for anxiety     FLUoxetine (PROZAC) 40 MG capsule TAKE 1 CAPSULE (40 MG) BY MOUTH DAILY     fluticasone (FLONASE) 50 MCG/ACT nasal spray Spray 1 spray into both nostrils daily     gabapentin (NEURONTIN) 400 MG capsule Take 1 capsule (400 mg) by mouth 3 times daily     ibuprofen (ADVIL/MOTRIN)  "800 MG tablet TAKE 1 TABLET TWICE DAILY AS NEEDED  FOR  MODERATE  PAIN     Lancets 30G MISC Test 4 times per day. ICD-10 code E11.9     levothyroxine (SYNTHROID/LEVOTHROID) 100 MCG tablet TAKE 1 TABLET EVERY DAY     loratadine-pseudoePHEDrine (CLARITIN-D 12-HOUR) 5-120 MG 12 hr tablet Take 1 tablet by mouth 2 times daily     metFORMIN (GLUCOPHAGE) 850 MG tablet Take 1 tablet (850 mg) by mouth 2 times daily (with meals)     methylPREDNISolone (MEDROL DOSEPAK) 4 MG tablet therapy pack Follow Package Directions     risperiDONE (RISPERDAL) 3 MG tablet Take 1 tablet (3 mg) by mouth 2 times daily     traZODone (DESYREL) 100 MG tablet TAKE 2 TABLETS (200 MG) BY MOUTH AT NIGHT AS NEEDED FOR SLEEP   (SUBSTITUTED FOR DESYREL)     No current facility-administered medications for this visit.          Social History   See HPI    Family History   History reviewed. No pertinent family history.      Physical Exam     Temp Readings from Last 3 Encounters:   03/06/20 98.3  F (36.8  C) (Oral)   02/05/20 98.1  F (36.7  C) (Oral)   01/23/20 98.7  F (37.1  C) (Oral)     BP Readings from Last 5 Encounters:   03/09/20 109/69   03/06/20 105/67   02/05/20 122/80   01/23/20 137/80   08/19/19 130/79     Pulse Readings from Last 1 Encounters:   03/09/20 91     Resp Readings from Last 1 Encounters:   01/23/20 18     Estimated body mass index is 28.63 kg/m  as calculated from the following:    Height as of this encounter: 1.562 m (5' 1.5\").    Weight as of this encounter: 69.9 kg (154 lb).    GEN: NAD  HEENT: MMM. No oral lesions. Anicteric, noninjected sclera  CV: S1, S2. RRR. No m/r/g.  PULM: CTA bilaterally. No w/c.  MSK: Mild synovial swelling and tenderness palpation of the bilateral second and third MCPs.  PIPs diffusely tender to palpation without swelling.  DIPs diffusely tender to palpation but no swelling.  Heberden's and Yasmin's nodes present.  Wrists, elbows, and shoulders mildly tender to palpation but no swelling increased " warmth.  Hips tender to palpation bilaterally.  Knees tender to palpation but no swelling or increased warmth.  Ankles tender to palpation but no swelling or increased warmth.  MTPs diffusely tender to palpation but no swelling or increased warmth.  Fibromyalgia tender points positive.      NEURO: UE and LE strengths 5/5 and equal bilaterally.   SKIN: No rash.  No nail pitting.  EXT: No LE edema  PSYCH: Alert. Appropriate.    Labs / Imaging (select studies)   RF/CCP  Recent Labs   Lab Test 04/22/19  1436   CCPIGG >340*   *     CBC  Recent Labs   Lab Test 10/21/19  0922 09/20/19  0957 08/19/19  1619   WBC 6.4 5.4 7.0   RBC 3.80 3.86 4.40   HGB 11.8 12.0 13.7   HCT 35.6 36.0 41.3   MCV 94 93 94   RDW 12.7 12.7 13.0    194 233   MCH 31.1 31.1 31.1   MCHC 33.1 33.3 33.2   NEUTROPHIL 59.6 45.4 48.3   LYMPH 28.1 40.4 39.0   MONOCYTE 9.5 9.7 10.2   EOSINOPHIL 2.5 4.1 2.1   BASOPHIL 0.3 0.4 0.4   ANEU 3.8 2.4 3.4   ALYM 1.8 2.2 2.7   FARHAD 0.6 0.5 0.7   AEOS 0.2 0.2 0.2   ABAS 0.0 0.0 0.0     CMP  Recent Labs   Lab Test 01/23/20  1432 10/21/19  0922 09/20/19  0957 08/19/19  1619  10/15/18  1407     --   --   --   --  141   POTASSIUM 3.9  --   --   --   --  4.0   CHLORIDE 107  --   --   --   --  111*   CO2 21  --   --   --   --  22   ANIONGAP 8  --   --   --   --  8   GLC 93  --   --   --   --  71   BUN 13  --   --   --   --  15   CR 0.96 0.85 0.97 1.06*   < > 0.96   GFRESTIMATED 64 75 64 57*   < > 59*   GFRESTBLACK 74 86 74 66   < > 72   MELANIE 9.3  --   --   --   --  9.2   BILITOTAL  --  0.2 0.4 0.2   < > 0.2   ALBUMIN  --  4.0 4.1 4.4   < > 4.3   PROTTOTAL  --  7.6 7.4 8.0   < > 7.8   ALKPHOS  --  74 64 68   < > 58   AST  --  19 28 28   < > 21   ALT  --  37 41 37   < > 35    < > = values in this interval not displayed.     Calcium/VitaminD  Recent Labs   Lab Test 01/23/20  1432 10/15/18  1407   MELANIE 9.3 9.2     ESR/CRP  Recent Labs   Lab Test 08/19/19  1619 04/22/19  1436   SED 9 9   CRP <2.9 <2.9     Lipid  Panel  Recent Labs   Lab Test 03/06/20  1416 10/15/18  1407 04/20/18   CHOL 124 130 228*   TRIG 125 75 245*   HDL 29* 55 61   LDL 70 60 118   NHDL 95 75 167*     Hepatitis B  Recent Labs   Lab Test 05/15/19  1409 04/22/19  1436   AUSAB  --  49.89*   HBCAB  --  Reactive*   HEPBANG  --  Nonreactive   HBQLOG Not Calculated  --      Hepatitis C  Recent Labs   Lab Test 04/22/19  1436   HCVAB Nonreactive     Lyme ab screening  Recent Labs   Lab Test 04/22/19  1436   LYMEGM 0.09     Tuberculosis Screening  Recent Labs   Lab Test 04/22/19  1436   TBRES Negative     HIV Screening  Recent Labs   Lab Test 01/04/19  1416   HIAGAB Nonreactive       Immunization History     Immunization History   Administered Date(s) Administered     FLU 6-35 months 01/27/2010     Influenza (High Dose) 3 valent vaccine 10/07/2016     Influenza (IIV3) PF 10/29/2010, 11/17/2011     Influenza Quad, Recombinant, p-free (RIV4) 10/15/2018, 02/05/2020     Influenza Vaccine IM > 6 months Valent IIV4 01/28/2016, 10/07/2016, 10/18/2017     Influenza Vaccine IM Ages 6-35 Months 4 Valent (PF) 10/11/2013     Pneumo Conj 13-V (2010&after) 10/15/2018     Pneumococcal 23 valent 04/19/2016     TD (ADULT, 7+) 09/13/2016     Twinrix A/B 01/27/2010     Zoster vaccine recombinant adjuvanted (SHINGRIX) 02/05/2020          Chart documentation done in part with Dragon Voice recognition Software. Although reviewed after completion, some word and grammatical error may remain.    Quinten Wright MD

## 2020-03-10 ENCOUNTER — TELEPHONE (OUTPATIENT)
Dept: RHEUMATOLOGY | Facility: CLINIC | Age: 60
End: 2020-03-10

## 2020-03-10 NOTE — TELEPHONE ENCOUNTER
PA Initiation    Medication: orencia   Insurance Company: [employer] - Phone [phone] Fax [phone]  Pharmacy Filling the Rx: [facility] - [place], [place] - [address] [place] [address]  Filling Pharmacy Phone:    Filling Pharmacy Fax:    Start Date: 3/10/2020

## 2020-03-10 NOTE — TELEPHONE ENCOUNTER
Humira was discontinued on 3/9/2020. Orencia started.  Sheila Hough CMA Rheumatology  3/10/2020 12:39 PM

## 2020-03-10 NOTE — TELEPHONE ENCOUNTER
Left message on answering machine for patient to call back team FERNANDO Keith, [phone]  Jennifer Marsh RN

## 2020-03-10 NOTE — TELEPHONE ENCOUNTER
PRIOR AUTHORIZATION DENIED    Medication: orencia     Denial Date: 3/10/2020    Denial Rational: insurance requires trial/failure of rinvoq or kevzara

## 2020-03-11 LAB
COPATH REPORT: NORMAL
PAP: NORMAL

## 2020-03-11 NOTE — RESULT ENCOUNTER NOTE
RN: Please call to notify Ms. Bagley that labs show elevated inflammatory markers and elevated white blood cell count, likely related to her upper respiratory infection.  Other labs were okay.    Quinten Wright MD  3/11/2020 5:53 PM

## 2020-03-11 NOTE — TELEPHONE ENCOUNTER
Pt returned call and left message on triage voice mail, attempted to call pt again and left message for pt to return my call.  July NAIDUN, RN

## 2020-03-11 NOTE — TELEPHONE ENCOUNTER
Left message on answering machine for patient to call back to [phone].  July Mosqueda BSN, RN

## 2020-03-11 NOTE — TELEPHONE ENCOUNTER
Irasema Gomez: please appeal.  Orencia preferred because of toxicity monitoring noncompliance.  Therefore, Rinvoq and Kevzara are not ideal because of needing to monitor more frequently. It is safer for the patient to be on Orencia because of the lab noncompliance.     Quinten Wright MD  3/11/2020 4:54 PM

## 2020-03-11 NOTE — TELEPHONE ENCOUNTER
Left message on answering machine for patient to call back to [phone].  July Mosqueda BSN, RN

## 2020-03-11 NOTE — TELEPHONE ENCOUNTER
RN: Please call the pharmacy to inform them that Humira was discontinued.  Please inform the patient that the Orencia prior authorization process is still underway; we are appealing the denial by her insurance.  Quinten Wright MD  3/11/2020 5:44 PM

## 2020-03-12 ENCOUNTER — TELEPHONE (OUTPATIENT)
Dept: RHEUMATOLOGY | Facility: CLINIC | Age: 60
End: 2020-03-12

## 2020-03-12 LAB
FINAL DIAGNOSIS: NORMAL
HPV HR 12 DNA CVX QL NAA+PROBE: NEGATIVE
HPV16 DNA SPEC QL NAA+PROBE: NEGATIVE
HPV18 DNA SPEC QL NAA+PROBE: NEGATIVE
SPECIMEN DESCRIPTION: NORMAL
SPECIMEN SOURCE CVX/VAG CYTO: NORMAL

## 2020-03-12 NOTE — TELEPHONE ENCOUNTER
Left message on answering machine for patient to call back to [phone].  July Mosqueda BSN, RN

## 2020-03-12 NOTE — TELEPHONE ENCOUNTER
----- Message from Quinten Wright MD sent at 3/11/2020  5:53 PM CDT -----  RN: Please call to notify Ms. Bagley that labs show elevated inflammatory markers and elevated white blood cell count, likely related to her upper respiratory infection.  Other labs were okay.    Quinten Wright MD  3/11/2020 5:53 PM

## 2020-03-12 NOTE — TELEPHONE ENCOUNTER
Medication Appeal Initiation    We have initiated an appeal for the requested medication:  Medication: orencia   Appeal Start Date:  3/12/2020  Insurance Company:    Comments:

## 2020-03-12 NOTE — TELEPHONE ENCOUNTER
Left message on answering machine for patient to call back to [phone].  July Mosqueda BSN, RN

## 2020-03-13 ENCOUNTER — TELEPHONE (OUTPATIENT)
Dept: RHEUMATOLOGY | Facility: CLINIC | Age: 60
End: 2020-03-13

## 2020-03-13 DIAGNOSIS — M05.79 RHEUMATOID ARTHRITIS INVOLVING MULTIPLE SITES WITH POSITIVE RHEUMATOID FACTOR (H): Primary | ICD-10-CM

## 2020-03-13 NOTE — TELEPHONE ENCOUNTER
Reason for Call:  Medication or medication refill:    Do you use a [facility]?  Name of the pharmacy and phone number for the current request:  [facility] in [place] [phone]    Name of the medication requested: prednisone     Other request: patient is requesting another script for prednisone be sent to pharmacy for her upper resp infection     Can we leave a detailed message on this number? YES    Phone number patient can be reached at: Home number on file [phone] (home)    Best Time: any    Call taken on 3/13/2020 at 12:10 PM by Rachel Mathias

## 2020-03-13 NOTE — TELEPHONE ENCOUNTER
MEDICATION APPEAL APPROVED    Medication: orencia   Authorization Effective Date: 3/13/2020  Authorization Expiration Date: 12/31/2020  Approved Dose/Quantity: 4/28ds  Reference #: [id number]   Insurance Company:    Expected CoPay: $8.95     CoPay Card Available: No    Foundation Assistance Needed: yes  Which Pharmacy is filling the prescription (Not needed for infusion/clinic administered): [facility] - [address]

## 2020-03-13 NOTE — TELEPHONE ENCOUNTER
"Spoke with patient who is requesting a prescription for prednisone until she receives her Orencia. Reports RA flare with pain in her right arm, knees, hips, ankles. Also having swelling in her fingers, hands, and toes. Reports \"everything feels stiff.\" Symptoms started 1 week ago. Additionally she is currently on Azithromycin for pneumonia and will take last dose today. Forwarded to Dr. Wrgiht.    (preferred pharmacy: [facility] on [place])    Jaron Lala RN....3/13/2020 1:35 PM     "

## 2020-03-16 ENCOUNTER — TELEPHONE (OUTPATIENT)
Dept: FAMILY MEDICINE | Facility: CLINIC | Age: 60
End: 2020-03-16

## 2020-03-16 DIAGNOSIS — M06.9 RHEUMATOID ARTHRITIS, INVOLVING UNSPECIFIED SITE, UNSPECIFIED RHEUMATOID FACTOR PRESENCE: ICD-10-CM

## 2020-03-16 NOTE — TELEPHONE ENCOUNTER
Reason for Call:  Other prescription    Detailed comments: pt wondering when pred will be filled.  Please call pt and discuss    Phone Number Patient can be reached at: Home number on file [phone] (home)    Best Time: any    Can we leave a detailed message on this number? YES    Call taken on 3/16/2020 at 11:24 AM by Patricia Martinez

## 2020-03-17 RX ORDER — IBUPROFEN 800 MG/1
TABLET, FILM COATED ORAL
Qty: 90 TABLET | Refills: 0 | Status: SHIPPED | OUTPATIENT
Start: 2020-03-17 | End: 2020-04-22

## 2020-03-17 NOTE — TELEPHONE ENCOUNTER
Called and advised patient that we are waiting to hear back from Dr. Wright regarding her prednisone request.  Patient verbalized understanding and will await our call.    Jaron Lala RN....3/17/2020 10:52 AM

## 2020-03-17 NOTE — TELEPHONE ENCOUNTER
Patient is calling to check the status on refill for prednisone    Patient stated she is in a lot of pain     Please call [phone]    Thank you

## 2020-03-17 NOTE — TELEPHONE ENCOUNTER
Called and advised patient that her prednisone request has been forwarded to Dr. Wright who has been out of office since last Thursday and returns tomorrow.   Advised her to be seen by PCP or contact PCP in meantime if pain becomes intolerable.  Patient verbalized understanding.    Jaron Lala RN....3/17/2020 10:53 AM

## 2020-03-18 RX ORDER — PREDNISONE 5 MG/1
TABLET ORAL
Qty: 50 TABLET | Refills: 0 | Status: SHIPPED | OUTPATIENT
Start: 2020-03-18 | End: 2020-09-28

## 2020-03-18 NOTE — TELEPHONE ENCOUNTER
RN: Please call to notify Ms. Bagley that a course of prednisone has been sent.  Notify her that prednisone increases her risk for infection so she needs to reduce risk for infection.    People with autoimmune disease and immunosuppression are considered at a higher risk for infections.  General precautions recommended:     Stay aware of the latest information on the COVID-19 outbreak, available on the WHO website and through your national and local public health authority.     Regularly and thoroughly clean your hands with an alcohol-based hand rub or wash them with soap and water.    Maintain at least 1 metre (3 feet) distance between yourself and anyone who is coughing or sneezing.    Avoid touching eyes, nose and mouth.    Make sure you, and the people around you, follow good respiratory hygiene. This means covering your mouth and nose with your bent elbow or tissue when you cough or sneeze. Then dispose of the used tissue immediately.    Stay home if you feel unwell. If you have a fever, cough and difficulty breathing, seek medical attention and call in advance.     1. Rheumatoid arthritis involving multiple sites with positive rheumatoid factor (H)  - predniSONE (DELTASONE) 5 MG tablet; Prednisone 20mg daily x5days, then 15mg daily x5days, then 10mg daily x5days, then 5mg daily x5days, then stop.  Dispense: 50 tablet; Refill: 0.  Sent to [facility] pharmacy [facility].     Quinten Wright MD  3/18/2020 12:10 PM

## 2020-03-18 NOTE — TELEPHONE ENCOUNTER
Called and informed [facility] pharmacy that Humira was discontinued.      Called patient and informed of Orencia PA still in process.     Juany RG, Lead RN, BSN. . .  3/18/2020, 12:46 PM

## 2020-03-18 NOTE — TELEPHONE ENCOUNTER
I called her home number and spoke to her.    She has taken the abx and the CT is scheduled.    I will contact her once I get the result.    Toñito Cortez M.D.

## 2020-03-18 NOTE — TELEPHONE ENCOUNTER
Called and informed patient of the message below.  Patient verbalized understanding and has no questions.    Jaron Lala RN....3/18/2020 4:06 PM

## 2020-03-19 DIAGNOSIS — M05.9 SEROPOSITIVE RHEUMATOID ARTHRITIS (H): ICD-10-CM

## 2020-03-19 NOTE — TELEPHONE ENCOUNTER
Requested Prescriptions   Pending Prescriptions Disp Refills     etanercept (ENBREL) 50 MG/ML injection 4 mL 3     Sig: Inject 0.98 mLs (50 mg) Subcutaneous every 7 days   Last Written Prescription Date:  07/19/19  Last Fill Quantity: 4,  # refills: 3   Last office visit: 3/9/2020 with prescribing provider:  IFRAH Wright     Future Office Visit:        There is no refill protocol information for this order

## 2020-03-20 ENCOUNTER — ANCILLARY PROCEDURE (OUTPATIENT)
Dept: CT IMAGING | Facility: CLINIC | Age: 60
End: 2020-03-20
Attending: FAMILY MEDICINE
Payer: COMMERCIAL

## 2020-03-20 DIAGNOSIS — R05.9 COUGH: ICD-10-CM

## 2020-03-20 DIAGNOSIS — R93.89 ABNORMAL CHEST X-RAY: ICD-10-CM

## 2020-03-20 PROCEDURE — 71250 CT THORAX DX C-: CPT | Mod: TC

## 2020-03-23 ENCOUNTER — TELEPHONE (OUTPATIENT)
Dept: FAMILY MEDICINE | Facility: CLINIC | Age: 60
End: 2020-03-23

## 2020-03-23 DIAGNOSIS — J32.9 SINUSITIS, UNSPECIFIED CHRONICITY, UNSPECIFIED LOCATION: ICD-10-CM

## 2020-03-23 DIAGNOSIS — R91.8 RIGHT LOWER LOBE LUNG MASS: Primary | ICD-10-CM

## 2020-03-23 NOTE — TELEPHONE ENCOUNTER
Provider message as written left on pt vm.  Advised I do need call back confirming she received this message.  July NAIDUN, RN

## 2020-03-23 NOTE — TELEPHONE ENCOUNTER
Pt notified of provider message as written.  Pt verbalized good understanding.  July Mosqueda BSN, RN

## 2020-03-23 NOTE — TELEPHONE ENCOUNTER
ONCOLOGY INTAKE: Records Information      APPT INFORMATION:  Referring provider:  Dr. Cortez   Referring provider s clinic:  [facility]  Reason for visit/diagnosis:  Right lower lobe lung mass  Has patient been notified of appointment date and time?: Yes.    RECORDS INFORMATION:  Were the records received with the referral (via [employer])? No, internal.    Has patient been seen for any external appt for this diagnosis? No.    If yes, where? N/A    Has patient had any imaging or procedures outside of [facility] for this condition? No.      If Yes, where? N/A    ADDITIONAL INFORMATION:  None.

## 2020-03-23 NOTE — TELEPHONE ENCOUNTER
Left message on answering machine for patient to call back to [phone].  July Mosqueda BSN, RN

## 2020-03-23 NOTE — TELEPHONE ENCOUNTER
Reason for Call:  Other results    Detailed comments: patient is calling for status on CT results recently done. Please call to discuss. Thank you.    Phone Number Patient can be reached at: Home number on file [phone] (home)    Best Time:     Can we leave a detailed message on this number? YES    Call taken on 3/23/2020 at 12:18 PM by Obdulia Angelo

## 2020-03-23 NOTE — TELEPHONE ENCOUNTER
Please call her home number:     1. The CT showed a mass - this needs to be evaluated by the lung nodule clinic.     2. Please call [phone] to set up consultation with them.    Toñito Cortez M.D.

## 2020-03-24 RX ORDER — FLUTICASONE PROPIONATE 50 MCG
1 SPRAY, SUSPENSION (ML) NASAL DAILY
Qty: 32 G | Refills: 0 | Status: SHIPPED | OUTPATIENT
Start: 2020-03-24 | End: 2020-06-16

## 2020-03-26 NOTE — TELEPHONE ENCOUNTER
Patient is not on enbrel, has been switched to Orencia. Please refuse medication.    Sheila Hough CMA Rheumatology  3/26/2020 11:35 AM

## 2020-03-31 ENCOUNTER — TELEPHONE (OUTPATIENT)
Dept: PULMONOLOGY | Facility: CLINIC | Age: 60
End: 2020-03-31

## 2020-03-31 DIAGNOSIS — R91.8 PULMONARY NODULES: Primary | ICD-10-CM

## 2020-03-31 NOTE — TELEPHONE ENCOUNTER
Contacted patient regarding upcoming pulmonary consult. Patient scheduled for pulmonary follow up in [place] with Dr. Chavis on Monday 04/06/2020.     D/t the COVID-19 situation pts pulmonary  appointments are needing to be rescheduled. CT and pulmonary office visit has been rescheduled to Monday 06/01/2020.      VM left for patient instructing her not to come to clinic Monday 04/06/2020. Encouraged call back to the clinic if 06/01/2020 does not work for pt.    Paulo Regan LPN    Rheumatology / Pulmonology  [facility]

## 2020-04-01 ENCOUNTER — TELEPHONE (OUTPATIENT)
Dept: FAMILY MEDICINE | Facility: CLINIC | Age: 60
End: 2020-04-01

## 2020-04-01 NOTE — TELEPHONE ENCOUNTER
See 3/23/2020 message.  Pt states her follow up appointment with pulmonology for the mass in her lungs was rescheduled from 4/6/20 to 6/1/2020.  To provider as MAGUE.  July NAIDUN, RN

## 2020-04-06 ENCOUNTER — PRE VISIT (OUTPATIENT)
Dept: PULMONOLOGY | Facility: CLINIC | Age: 60
End: 2020-04-06

## 2020-04-21 DIAGNOSIS — M06.9 RHEUMATOID ARTHRITIS, INVOLVING UNSPECIFIED SITE, UNSPECIFIED RHEUMATOID FACTOR PRESENCE: ICD-10-CM

## 2020-04-22 RX ORDER — IBUPROFEN 800 MG/1
TABLET, FILM COATED ORAL
Qty: 90 TABLET | Refills: 0 | Status: SHIPPED | OUTPATIENT
Start: 2020-04-22 | End: 2020-06-15

## 2020-04-23 ENCOUNTER — TELEPHONE (OUTPATIENT)
Dept: PULMONOLOGY | Facility: CLINIC | Age: 60
End: 2020-04-23

## 2020-04-23 NOTE — TELEPHONE ENCOUNTER
Dr. Chavis called patient and discussed plan with her.     Shanell Johansen RN   Medical Specialty Clinic Care Coordinator  [facility]

## 2020-04-23 NOTE — TELEPHONE ENCOUNTER
Telephone call     Called Ms. Bagley today. She is hard of hearing.   Discussed ct findings. She reported she had pneumonia in March and received antibiotics now feels better.   Plan is repeat CT in June. She is agreeable.     Mahin Chavis MD   of Medicine  Interventional Pulmonary  Department of Pulmonary, Allergy, Critical Care and Sleep Medicine   [facility]  Pager: [phone]   Office: [phone]  Email: [email or web address]    Collette RAMIREZ, OCN  Clinical Nurse Specialist  Department of Thoracic Surgery  Office: [phone]  Email: [email or web address]    Joanne Christina  Interventional Pulmonology Surgery Scheduler  Office: [phone]  Email: [email or web address]

## 2020-04-23 NOTE — TELEPHONE ENCOUNTER
"Patient contacted clinic regarding CT chest results from 03/20/2020. Patient states that Dr. Chavis called her regarding results. Patient states that she did not understand what Dr. Chavis was saying , as he was speaking \"too softly\".     Patient would like to know the results of her CT chest from 03/20/2020. Patient is wondering if she has an infection and would like to know what the treatment will be if she has an infection. Patient is scheduled for repeat chest CT 06/01/2020.    Will send a message to Dr. Chavis for clarification on CT chest results.    Paulo Regan LPN    Rheumatology / Pulmonology  [facility]    "

## 2020-04-27 ENCOUNTER — TELEPHONE (OUTPATIENT)
Dept: FAMILY MEDICINE | Facility: CLINIC | Age: 60
End: 2020-04-27

## 2020-04-27 DIAGNOSIS — M06.9 RHEUMATOID ARTHRITIS, INVOLVING UNSPECIFIED SITE, UNSPECIFIED RHEUMATOID FACTOR PRESENCE: Primary | ICD-10-CM

## 2020-04-27 RX ORDER — PREDNISONE 5 MG/1
TABLET ORAL
Qty: 50 TABLET | Refills: 0 | Status: SHIPPED | OUTPATIENT
Start: 2020-04-27 | End: 2020-09-28

## 2020-04-27 NOTE — TELEPHONE ENCOUNTER
Patient developed an RA flare approximately 2-3 weeks ago. Having pain, stiffness, swelling, redness,and warmth to the touch in right arm and shoulder, left elbow, both knees, and toes of right foot. Patient started Orencia about 1-1.5 months ago. Takes Ibuprofen daily. Wondering if she can get course of prednisone. Forwarded to Dr. Wright.    Jaron Lala RN....4/27/2020 9:19 AM

## 2020-04-27 NOTE — TELEPHONE ENCOUNTER
RN: Please call to notify Ms. Bagley that a prednisone course has been sent.     1. Rheumatoid arthritis, involving unspecified site, unspecified rheumatoid factor presence (H)  - predniSONE (DELTASONE) 5 MG tablet; Prednisone 20mg daily x5days, then 15mg daily x5days, then 10mg daily x5days, then 5mg daily x5days, then stop.  Dispense: 50 tablet; Refill: 0      Quinten Wright MD  4/27/2020 4:30 PM

## 2020-04-27 NOTE — TELEPHONE ENCOUNTER
Patient is calling to ask provider if he would send in a script for prednisone she is having a bad flare of     Please call to advice  Thank you

## 2020-04-27 NOTE — TELEPHONE ENCOUNTER
Called patient and left message to return call to the clinic.    Jaron Lala RN....4/27/2020 9:09 AM

## 2020-04-29 NOTE — TELEPHONE ENCOUNTER
2nd attempt to reach the patient was unsuccesful.  (called home and mobile phones)  Left messages for patient to return call to clinic.    Jaron Lala RN....4/29/2020 9:07 AM

## 2020-04-30 ENCOUNTER — TELEPHONE (OUTPATIENT)
Dept: FAMILY MEDICINE | Facility: CLINIC | Age: 60
End: 2020-04-30

## 2020-04-30 DIAGNOSIS — G62.9 PERIPHERAL POLYNEUROPATHY: ICD-10-CM

## 2020-04-30 RX ORDER — GABAPENTIN 400 MG/1
400 CAPSULE ORAL 3 TIMES DAILY
Qty: 270 CAPSULE | Refills: 2 | Status: SHIPPED | OUTPATIENT
Start: 2020-04-30 | End: 2021-02-23

## 2020-05-27 ENCOUNTER — TELEPHONE (OUTPATIENT)
Dept: FAMILY MEDICINE | Facility: CLINIC | Age: 60
End: 2020-05-27

## 2020-05-27 NOTE — TELEPHONE ENCOUNTER
Left message on voice mail to call pharmacy to schedule 2nd shingrix vaccine.    Thank You  Donna Baxter, [facility]  [phone]

## 2020-06-01 ENCOUNTER — VIRTUAL VISIT (OUTPATIENT)
Dept: PULMONOLOGY | Facility: CLINIC | Age: 60
End: 2020-06-01
Payer: COMMERCIAL

## 2020-06-01 ENCOUNTER — ANCILLARY PROCEDURE (OUTPATIENT)
Dept: CT IMAGING | Facility: CLINIC | Age: 60
End: 2020-06-01
Attending: INTERNAL MEDICINE
Payer: COMMERCIAL

## 2020-06-01 DIAGNOSIS — R91.8 PULMONARY NODULES: Primary | ICD-10-CM

## 2020-06-01 DIAGNOSIS — R91.8 PULMONARY NODULES: ICD-10-CM

## 2020-06-01 PROCEDURE — 71250 CT THORAX DX C-: CPT | Performed by: RADIOLOGY

## 2020-06-01 PROCEDURE — 99204 OFFICE O/P NEW MOD 45 MIN: CPT | Mod: TEL | Performed by: INTERNAL MEDICINE

## 2020-06-01 NOTE — PROGRESS NOTES
"LUNG NODULE & INTERVENTIONAL PULMONARY CLINIC  [facility], [facility], [facility]   VIRTUAL TELEPHONE VISIT    Skylar Bagley MRN# [id number]   Age: 60 year old YOB: 1960     Reason for Consultation: lung nodule(s)    Requesting Physician: No referring provider defined for this encounter.    Skylar Bagley is a 60 year old female who is being evaluated via a billable telephone visit.      The patient has been notified of following: \"This telephone visit will be conducted via a call between you and your physician/provider. We have found that certain health care needs can be provided without the need for a physical exam.  This service lets us provide the care you need with a short phone conversation.  If a prescription is necessary we can send it directly to your pharmacy.  If lab work is needed we can place an order for that and you can then stop by our lab to have the test done at a later time. Telephone visits are billed at different rates depending on your insurance coverage. During this emergency period, for some insurers they may be billed the same as an in-person visit.  Please reach out to your insurance provider with any questions. If during the course of the call the physician/provider feels a telephone visit is not appropriate, you will not be charged for this service.\"    Patient has given verbal consent for Telephone visit?  Yes    How would you like to obtain your AVS? Jairo    Phone call duration: 25 minutes    Additional provider notes: see below     Assessment and Plan:    1. Bilateral LL nodular infiltrate. Markedly improved since March following prednisone burst. This is c/w rheumatoid inflammation/necrobiotic nodules. Will repeat CT in 6mo to follow resolution.     2. Lung cancer screening. She meets criteria for annual LDCT. Will enroll following surveillance for above.     3.Tobacco use. Quit 3mo ago. Encouraged to cont " cessation    Billing: The patient was seen and examined by me and the findings, assessment, and plan as documented was explained to the patient/family who expressed understand.       Mahin Chavis MD   of Medicine  Interventional Pulmonology  Department of Pulmonary, Allergy, Critical Care and Sleep Medicine   [facility]  Pager: [phone]          History:     Skylar Bagley is a 60 year old female with sig h/o for MDD, DM2, hypothyroidism, RA, hyperlipidemia who is here for evaluation/followup of lung nodule(s).    - No new resp sx or complaints. Denies dyspnea or cough.   - Had flair up of her RA about 1-month ago. Had CT chest and showed bilateral LLL nodular infiltrates.   - Personal hx of cancer: No. Up-to-date on mammo and c-scope   - Family hx of cancer: No  - Exposure hx: Denies asbestos or radon exposure   - Tobacco hx: Current Smoker: 1ppd since 18yo. Longest she quit was 6yrs. She quit smoking 3mo ago due to abnormal imaging.   - My interpretation of the images relevant for this visit includes: nodular   - My interpretation of the PFT's relevant for this visit includes: None     Culprit Nodule(s):   1. Bilateral LL lung nodules. Markedly improved since March.     Other active medical problems include:   - Had MDD. stable   - Has RA. Recent flair and started on prednisone burst. Maintained on orencia    - Has hypothyroidism. Stable.             Past Medical History:      Past Medical History:   Diagnosis Date     Arthritis      Depressive disorder      Diabetes (H)      Thyroid disease            Past Surgical History:      Past Surgical History:   Procedure Laterality Date     VAG DELIV ONLY,PREV C-SECTN            Social History:     Social History     Tobacco Use     Smoking status: Current Every Day Smoker     Packs/day: 0.50     Years: 20.00     Pack years: 10.00     Types: Cigarettes     Smokeless tobacco: Never Used   Substance Use Topics     Alcohol  use: No          Family History:   No family history on file.        Allergies:      Allergies   Allergen Reactions     Asa [Aspirin]      Methotrexate Other (See Comments)          Medications:     Current Outpatient Medications   Medication Sig     Abatacept (ORENCIA CLICKJECT) 125 MG/ML SOAJ auto-injector Inject 1 mL (125 mg) Subcutaneous every 7 days ; refills at clinic visits only.     ACE/ARB/ARNI NOT PRESCRIBED, INTENTIONAL, Please choose reason not prescribed, below     ASPIRIN NOT PRESCRIBED (INTENTIONAL) Please choose reason not prescribed, below     atorvastatin (LIPITOR) 40 MG tablet Take 1 tablet (40 mg) by mouth daily     azithromycin (ZITHROMAX) 250 MG tablet Two tablets first day, then one tablet daily for four days.     blood glucose monitoring (CONTOUR NEXT TEST) test strip TEST 4 TIMES A DAY     calcium carbonate 500 mg-vitamin D 200 units (OSCAL WITH D;OYSTER SHELL CALCIUM) 500-200 MG-UNIT per tablet Take 1 tablet by mouth     clonazePAM (KLONOPIN) 1 MG tablet Take 1 tablet (1 mg) by mouth 2 times daily as needed for anxiety     FLUoxetine (PROZAC) 40 MG capsule TAKE 1 CAPSULE (40 MG) BY MOUTH DAILY     fluticasone (FLONASE) 50 MCG/ACT nasal spray SPRAY 1 SPRAY INTO BOTH NOSTRILS DAILY     gabapentin (NEURONTIN) 400 MG capsule Take 1 capsule (400 mg) by mouth 3 times daily     ibuprofen (IBU) 800 MG tablet TAKE 1 TABLET TWICE DAILY AS NEEDED  FOR  MODERATE  PAIN     Lancets 30G MISC Test 4 times per day. ICD-10 code E11.9     levothyroxine (SYNTHROID/LEVOTHROID) 100 MCG tablet TAKE 1 TABLET EVERY DAY     loratadine-pseudoePHEDrine (CLARITIN-D 12-HOUR) 5-120 MG 12 hr tablet Take 1 tablet by mouth 2 times daily     metFORMIN (GLUCOPHAGE) 850 MG tablet Take 1 tablet (850 mg) by mouth 2 times daily (with meals)     methylPREDNISolone (MEDROL DOSEPAK) 4 MG tablet therapy pack Follow Package Directions     predniSONE (DELTASONE) 5 MG tablet Prednisone 20mg daily x5days, then 15mg daily x5days, then  10mg daily x5days, then 5mg daily x5days, then stop.     predniSONE (DELTASONE) 5 MG tablet Prednisone 20mg daily x5days, then 15mg daily x5days, then 10mg daily x5days, then 5mg daily x5days, then stop.     risperiDONE (RISPERDAL) 3 MG tablet Take 1 tablet (3 mg) by mouth 2 times daily     traZODone (DESYREL) 100 MG tablet TAKE 2 TABLETS (200 MG) BY MOUTH AT NIGHT AS NEEDED FOR SLEEP   (SUBSTITUTED FOR DESYREL)     No current facility-administered medications for this visit.           Review of Systems:     CONSTITUTIONAL: negative for fever, chills, change in weight  INTEGUMENTARY/SKIN: no rash or obvious new lesions  ENT/MOUTH: no sore throat, new sinus pain or nasal drainage  RESP: see interval history  CV: negative for chest pain, palpitations or peripheral edema  GI: no nausea, vomiting, change in stools  : no dysuria  MUSCULOSKELETAL: no myalgias, arthralgias  ENDOCRINE: blood sugars with adequate control  PSYCHIATRIC: mood stable  LYMPHATIC: no new lymphadenopathy  HEME: no bleeding or easy bruisability  NEURO: no numbness, weakness, headaches         Physical Exam:      A comprehensive physical examination is deferred due to [facility] (public health emergency) telephone visit restrictions.         Current Laboratory Data:   All laboratory and imaging data reviewed.    No results found for this or any previous visit (from the past 24 hour(s)).           Previous Cardiology Imaging   No results found for this or any previous visit (from the past 8760 hour(s)).

## 2020-06-01 NOTE — PROGRESS NOTES
"Skylar Bagley is a 60 year old female who is being evaluated via a billable telephone visit.      The patient has been notified of following:     \"This telephone visit will be conducted via a call between you and your physician/provider. We have found that certain health care needs can be provided without the need for a physical exam.  This service lets us provide the care you need with a short phone conversation.  If a prescription is necessary we can send it directly to your pharmacy.  If lab work is needed we can place an order for that and you can then stop by our lab to have the test done at a later time.    Telephone visits are billed at different rates depending on your insurance coverage. During this emergency period, for some insurers they may be billed the same as an in-person visit.  Please reach out to your insurance provider with any questions.    If during the course of the call the physician/provider feels a telephone visit is not appropriate, you will not be charged for this service.\"    Patient has given verbal consent for Telephone visit?  Yes    What phone number would you like to be contacted at? [phone]    How would you like to obtain your AVS? Mail a copy    Phone call duration: 25 minutes    Paulo Regan LPN    Rheumatology / Pulmonology  [facility]        "

## 2020-06-05 ENCOUNTER — TELEPHONE (OUTPATIENT)
Dept: FAMILY MEDICINE | Facility: CLINIC | Age: 60
End: 2020-06-05

## 2020-06-05 NOTE — TELEPHONE ENCOUNTER
Panel Management Review      Patient has the following on her problem list:     Diabetes    ASA: per note patient not prescribed    Last A1C  Lab Results   Component Value Date    A1C 5.9 01/23/2020    A1C 5.8 05/15/2019    A1C 5.5 10/15/2018    A1C 5.6 04/20/2018    A1C 8.6 09/15/2007     A1C tested: FAILED    Last LDL:    Lab Results   Component Value Date    CHOL 124 03/06/2020     Lab Results   Component Value Date    HDL 29 03/06/2020     Lab Results   Component Value Date    LDL 70 03/06/2020     Lab Results   Component Value Date    TRIG 125 03/06/2020     No results found for: CHOLHDLRATIO  Lab Results   Component Value Date    NHDL 95 03/06/2020       Is the patient on a Statin? YES             Is the patient on Aspirin? NO    Medications     HMG CoA Reductase Inhibitors     atorvastatin (LIPITOR) 40 MG tablet       Salicylates     ASPIRIN NOT PRESCRIBED (INTENTIONAL)             Last three blood pressure readings:  BP Readings from Last 3 Encounters:   03/09/20 109/69   03/06/20 105/67   02/05/20 122/80       Date of last diabetes office visit: 03/06/2020     Tobacco History:     History   Smoking Status     Current Every Day Smoker     Packs/day: 0.50     Years: 20.00     Types: Cigarettes   Smokeless Tobacco     Never Used           Composite cancer screening  Chart review shows that this patient is due/due soon for the following None  Summary:    Patient is due/failing the following:   None    Action needed:   None per  note on 03/06/2020 patient is follow back in 6 months.     Type of outreach:    None    Questions for provider review:    None                                                                                                                                    Mady Guillen MA       Chart routed to closed .

## 2020-06-12 DIAGNOSIS — M06.9 RHEUMATOID ARTHRITIS, INVOLVING UNSPECIFIED SITE, UNSPECIFIED RHEUMATOID FACTOR PRESENCE: ICD-10-CM

## 2020-06-15 RX ORDER — IBUPROFEN 800 MG/1
TABLET, FILM COATED ORAL
Qty: 90 TABLET | Refills: 0 | Status: SHIPPED | OUTPATIENT
Start: 2020-06-15 | End: 2020-08-07

## 2020-06-15 NOTE — TELEPHONE ENCOUNTER
Next 5 appointments (look out 90 days)    Jun 22, 2020  1:40 PM CDT  [facility] with Quinten [facility] MD  [facility] [address] [address]  [phone]   Jul 01, 2020 12:20 PM CDT  Office Visit with Toñito Cortez MD  [facility] ([facility]) [address] [facility] [id number] [phone]        Rx refilled per [facility] refill protocol.    Adriana Thornton BSN, RN

## 2020-06-16 DIAGNOSIS — J32.9 SINUSITIS, UNSPECIFIED CHRONICITY, UNSPECIFIED LOCATION: ICD-10-CM

## 2020-06-16 RX ORDER — FLUTICASONE PROPIONATE 50 MCG
1 SPRAY, SUSPENSION (ML) NASAL DAILY
Qty: 32 G | Refills: 0 | Status: SHIPPED | OUTPATIENT
Start: 2020-06-16 | End: 2020-08-07

## 2020-06-16 NOTE — TELEPHONE ENCOUNTER
Reason for Call:  Other prescription    Detailed comments: Ashkan is calling to request refill for RX: fluticasone (FLONASE) 50 MCG/ACT nasal spray. Thank you.    Phone Number Patient can be reached at: [phone]    Best Time:     Can we leave a detailed message on this number? YES    Call taken on 6/16/2020 at 10:20 AM by Obdulia Angelo

## 2020-06-16 NOTE — TELEPHONE ENCOUNTER
Rx refilled per [facility] refill protocol.    Adriana Thornton BSN, RN

## 2020-06-20 DIAGNOSIS — E11.9 TYPE 2 DIABETES MELLITUS WITHOUT COMPLICATION, WITHOUT LONG-TERM CURRENT USE OF INSULIN (H): ICD-10-CM

## 2020-06-22 ENCOUNTER — VIRTUAL VISIT (OUTPATIENT)
Dept: RHEUMATOLOGY | Facility: CLINIC | Age: 60
End: 2020-06-22
Payer: COMMERCIAL

## 2020-06-22 DIAGNOSIS — M25.511 CHRONIC RIGHT SHOULDER PAIN: Primary | ICD-10-CM

## 2020-06-22 DIAGNOSIS — G89.29 CHRONIC RIGHT SHOULDER PAIN: Primary | ICD-10-CM

## 2020-06-22 PROCEDURE — 99214 OFFICE O/P EST MOD 30 MIN: CPT | Mod: 95 | Performed by: INTERNAL MEDICINE

## 2020-06-22 NOTE — PROGRESS NOTES
"Skylar Bagley is a 60 year old female who is being evaluated via a billable telephone visit.      The patient has been notified of following:     \"This telephone visit will be conducted via a call between you and your physician/provider. We have found that certain health care needs can be provided without the need for a physical exam.  This service lets us provide the care you need with a short phone conversation.  If a prescription is necessary we can send it directly to your pharmacy.  If lab work is needed we can place an order for that and you can then stop by our lab to have the test done at a later time.    Telephone visits are billed at different rates depending on your insurance coverage. During this emergency period, for some insurers they may be billed the same as an in-person visit.  Please reach out to your insurance provider with any questions.    If during the course of the call the physician/provider feels a telephone visit is not appropriate, you will not be charged for this service.\"    Patient has given verbal consent for Telephone visit?  Yes    What phone number would you like to be contacted at? [phone]    How would you like to obtain your AVS? Mail a copy      Rheumatology Telephone/Telehealth  Visit      Skylar Bagley MRN# [id number]   YOB: 1960 Age: 60 year old      Date of visit: 6/22/20   PCP: Dr. Toñito Cortez    Chief Complaint   No chief complaint on file.      Assessment and Plan     1.  Rheumatoid arthritis (, CCP >340): Reportedly diagnosed prior to 1995.  Established care with me on 4/22/2019.  Previously treated with methotrexate (LFT elevation), hydroxychloroquine (retinal toxicity), Enbrel (partially effective), leflunomide (stopped by patient due to reported associated alopecia), Humira (partially effective).  Mild synovitis on exam previously and there was a hx of clinic and toxicity monitoring nonadherence; Humira was stopped and Orencia " started.  Currently doing well on Orencia monotherapy with the exception of her right shoulder; see #4  - Continue Orencia 125 mg SQ every 7 days; refill after labs are completed to ensure safety    2. Fibromyalgia: management per PCP    3. Primary osteoarthritis of both hands: evidenced by Heberden's and Yasmin's nodes.      4. Cigarette Smoking:  Encouraged complete cessation and discussed the health benefits.      5.  Right shoulder pain: In March 2020 she had a flare of arthritis involving multiple joints; this resolved with a prednisone taper.  She had another flare in April 2020 but this time only her right shoulder and this resolved with a prednisone taper.  She is now again having right shoulder pain; degenerative in nature question if there is a rotator cuff issue.  Check x-rays and depending on results may need an MRI.  She reports having no claustrophobia and tolerates MRIs well she says.  - X-ray: right shoulder    6.  Osteoporosis screening: Risk factors include postmenopausal status, rheumatoid arthritis, cigarette smoking.  Check DEXA  - DEXA ordered; patient was advised to call to schedule again    7. Hepatitis B core ab positive: PCR negative.      # Relevant labs and imaging were reviewed with the patient    # High risk medication toxicity monitoring: discussion and labs reviewed; appropriate labs ordered. See above.  Instructed that if confirmed to have COVID-19 or exposure to someone with confirmed COVID-19 to call this clinic for directions on DMARD management.    # Note that this is a virtual visit to reduce the risk of COVID-19 exposure during this current pandemic.      # Considered to be at high risk of complications from the COVID-19 virus.  It is recommended to limit contact with other people and if possible to work remotely or provide a leave of absence to reduce the risk for COVID-19.      I explained that to safely continue medications, clinic follow up as recommended and labs as  recommended are needed.  If clinic follow up and labs are not completed as directed then it will be up to the discretion of the prescribing provider to determine if a refill will be given.  If it is determined that a refill will not be given based on insufficient monitoring, such as having not been seen back in the rheumatology clinic for evaluation, then it is reasonable to discuss with your primary care provider where monitoring and refills may be considered.  I also explained that all patients followed in the rheumatology clinic need to have a primary care provider.     Ms. Bagley verbalized agreement with and understanding of the rational for the diagnosis and treatment plan.  All questions were answered to best of my ability and the patient's satisfaction. Ms. Bagley was advised to contact the clinic with any questions that may arise after the clinic visit.      Thank you for involving me in the care of the patient    Return to clinic: 3 months      HPI   Skylar Bagley is a 60 year old female with a past medical history significant for diabetes, GERD, hypothyroidism, hyperlipidemia, anxiety, depression, and RA who is seen for follow-up of rheumatoid arthritis.    Evaluated in clinic on 8/19/2019    No-show to scheduled appointment on 11/26/2019    No-show to scheduled appointment on 2/3/2020    Patient canceled appointment on 3/3/2020    3/9/2020: Ms. Bagley reported that Humira caused a lot of infections - sinus, lung, eye, etc she says.  Has been tx'd with abx once she says.  Humira was last taken about 1 week ago but has missed many doses because of infections.  Less infections when on Enbrel and she says that she prefers Enbrel.  Arms, feet, hands, ankles, knees; worse all day long she says and doesn't do activity she says. Stopped Leflunomide because her hair was falling out; says that she took vitamins and the hair thinning resolved     Today, 6/22/2020: right shoulder pain again, worse  with any over the head activity and worse when she lays on it; 24/7 pain she says.  Note that she had polyarticular joint pain arthritis flare in March that resolved with a prednisone taper.  She had another flare in April but this time only include her right shoulder; this resolved with prednisone taper.  Now with right shoulder pain again.  She reports that all of her other joints are doing great on Orencia.  Morning stiffness <10 min. No gelling.  Says that she forgot to get labs last week; she says that she had a conflict and will make an appointment for this week.      Denies fevers, chills, nausea, or vomiting.  Chronic constipation.  No diarrhea.   No abdominal pain. No chest pain/pressure, palpitations, or shortness of breath. No LE swelling. No neck pain. No oral or nasal sores.  No rash. No sicca symptoms. No photosensitivity or photophobia. No eye pain or redness. No history of inflammatory eye disease.  No history of inflammatory bowel disease.  No history of DVT, pulmonary embolism, or miscarriage.   No history of serositis.        Tobacco: Cigarette smoking  EtOH: None  Drugs: None  Occupation:  Reportedly on disability because of rheumatoid arthritis    ROS   GEN: No fevers, chills, night sweats, or weight change  SKIN: No itching, rashes, sores  HEENT: No oral or nasal ulcers.  CV: No chest pain, pressure, palpitations, or dyspnea on exertion.  PULM: No SOB, wheeze, cough.  GI: See HPI.  No blood in stool  : No blood in urine.  MSK: See HPI.  NEURO: No numbness or tingling  ENDO: No heat/cold intolerance.   EXT: No LE swelling  PSYCH: See HPI    Active Problem List     Patient Active Problem List   Diagnosis     Type 2 diabetes mellitus without complication, without long-term current use of insulin (H)     Dyslipidemia     Class 1 obesity with serious comorbidity and body mass index (BMI) of 31.0 to 31.9 in adult, unspecified obesity type     Hypothyroidism, unspecified type     Gastroesophageal  reflux disease, esophagitis presence not specified     Rheumatoid arthritis, involving unspecified site, unspecified rheumatoid factor presence (H)     Rheumatoid arthritis involving multiple sites with positive rheumatoid factor (H)     Past Medical History     Past Medical History:   Diagnosis Date     Arthritis      Depressive disorder      Diabetes (H)      Thyroid disease      Past Surgical History     Past Surgical History:   Procedure Laterality Date     VAG DELIV ONLY,PREV C-SECTN       Allergy     Allergies   Allergen Reactions     Asa [Aspirin]      Methotrexate Other (See Comments)     Current Medication List     Current Outpatient Medications   Medication Sig     Abatacept (ORENCIA CLICKJECT) 125 MG/ML SOAJ auto-injector Inject 1 mL (125 mg) Subcutaneous every 7 days ; refills at clinic visits only.     atorvastatin (LIPITOR) 40 MG tablet Take 1 tablet (40 mg) by mouth daily     calcium carbonate 500 mg-vitamin D 200 units (OSCAL WITH D;OYSTER SHELL CALCIUM) 500-200 MG-UNIT per tablet Take 1 tablet by mouth     clonazePAM (KLONOPIN) 1 MG tablet Take 1 tablet (1 mg) by mouth 2 times daily as needed for anxiety     FLUoxetine (PROZAC) 40 MG capsule TAKE 1 CAPSULE (40 MG) BY MOUTH DAILY     fluticasone (FLONASE) 50 MCG/ACT nasal spray Spray 1 spray into both nostrils daily     gabapentin (NEURONTIN) 400 MG capsule Take 1 capsule (400 mg) by mouth 3 times daily     levothyroxine (SYNTHROID/LEVOTHROID) 100 MCG tablet TAKE 1 TABLET EVERY DAY     metFORMIN (GLUCOPHAGE) 850 MG tablet Take 1 tablet (850 mg) by mouth 2 times daily (with meals)     risperiDONE (RISPERDAL) 3 MG tablet Take 1 tablet (3 mg) by mouth 2 times daily     traZODone (DESYREL) 100 MG tablet TAKE 2 TABLETS (200 MG) BY MOUTH AT NIGHT AS NEEDED FOR SLEEP   (SUBSTITUTED FOR DESYREL) (Patient taking differently: At Bedtime )     ACE/ARB/ARNI NOT PRESCRIBED, INTENTIONAL, Please choose reason not prescribed, below (Patient not taking: Reported on  "6/1/2020)     ASPIRIN NOT PRESCRIBED (INTENTIONAL) Please choose reason not prescribed, below (Patient not taking: Reported on 6/1/2020)     azithromycin (ZITHROMAX) 250 MG tablet Two tablets first day, then one tablet daily for four days. (Patient not taking: Reported on 6/1/2020)     blood glucose monitoring (CONTOUR NEXT TEST) test strip TEST 4 TIMES A DAY     ibuprofen (IBU) 800 MG tablet TAKE 1 TABLET TWICE DAILY AS NEEDED FOR  MODERATE  PAIN     Lancets 30G MISC Test 4 times per day. ICD-10 code E11.9     loratadine-pseudoePHEDrine (CLARITIN-D 12-HOUR) 5-120 MG 12 hr tablet Take 1 tablet by mouth 2 times daily (Patient not taking: Reported on 6/1/2020)     methylPREDNISolone (MEDROL DOSEPAK) 4 MG tablet therapy pack Follow Package Directions (Patient not taking: Reported on 6/1/2020)     predniSONE (DELTASONE) 5 MG tablet Prednisone 20mg daily x5days, then 15mg daily x5days, then 10mg daily x5days, then 5mg daily x5days, then stop. (Patient not taking: Reported on 6/1/2020)     predniSONE (DELTASONE) 5 MG tablet Prednisone 20mg daily x5days, then 15mg daily x5days, then 10mg daily x5days, then 5mg daily x5days, then stop. (Patient not taking: Reported on 6/1/2020)     No current facility-administered medications for this visit.          Social History   See HPI    Family History   History reviewed. No pertinent family history.      Physical Exam     Temp Readings from Last 3 Encounters:   03/06/20 98.3  F (36.8  C) (Oral)   02/05/20 98.1  F (36.7  C) (Oral)   01/23/20 98.7  F (37.1  C) (Oral)     BP Readings from Last 5 Encounters:   03/09/20 109/69   03/06/20 105/67   02/05/20 122/80   01/23/20 137/80   08/19/19 130/79     Pulse Readings from Last 1 Encounters:   03/09/20 91     Resp Readings from Last 1 Encounters:   01/23/20 18     Estimated body mass index is 28.63 kg/m  as calculated from the following:    Height as of 3/9/20: 1.562 m (5' 1.5\").    Weight as of 3/9/20: 69.9 kg (154 lb).    Telephone Visit "     Labs / Imaging (select studies)   RF/CCP  Recent Labs   Lab Test 04/22/19  1436   CCPIGG >340*   *     CBC  Recent Labs   Lab Test 03/09/20  1324 10/21/19  0922 09/20/19  0957   WBC 13.6* 6.4 5.4   RBC 3.72* 3.80 3.86   HGB 10.9* 11.8 12.0   HCT 34.2* 35.6 36.0   MCV 92 94 93   RDW 13.5 12.7 12.7    202 194   MCH 29.3 31.1 31.1   MCHC 31.9 33.1 33.3   NEUTROPHIL 73.1 59.6 45.4   LYMPH 16.0 28.1 40.4   MONOCYTE 10.1 9.5 9.7   EOSINOPHIL 0.6 2.5 4.1   BASOPHIL 0.2 0.3 0.4   ANEU 10.0* 3.8 2.4   ALYM 2.2 1.8 2.2   FARHAD 1.4* 0.6 0.5   AEOS 0.1 0.2 0.2   ABAS 0.0 0.0 0.0     CMP  Recent Labs   Lab Test 03/09/20  1324 01/23/20  1432 10/21/19  0922 09/20/19  0957  10/15/18  1407   NA  --  136  --   --   --  141   POTASSIUM  --  3.9  --   --   --  4.0   CHLORIDE  --  107  --   --   --  111*   CO2  --  21  --   --   --  22   ANIONGAP  --  8  --   --   --  8   GLC  --  93  --   --   --  71   BUN  --  13  --   --   --  15   CR 1.05* 0.96 0.85 0.97   < > 0.96   GFRESTIMATED 58* 64 75 64   < > 59*   GFRESTBLACK 67 74 86 74   < > 72   MELANIE  --  9.3  --   --   --  9.2   BILITOTAL 0.2  --  0.2 0.4   < > 0.2   ALBUMIN 2.8*  --  4.0 4.1   < > 4.3   PROTTOTAL 8.1  --  7.6 7.4   < > 7.8   ALKPHOS 99  --  74 64   < > 58   AST 25  --  19 28   < > 21   ALT 27  --  37 41   < > 35    < > = values in this interval not displayed.     Calcium/VitaminD  Recent Labs   Lab Test 01/23/20  1432 10/15/18  1407   MELANIE 9.3 9.2     ESR/CRP  Recent Labs   Lab Test 03/09/20  1324 08/19/19  1619 04/22/19  1436   * 9 9   .0* <2.9 <2.9     Lipid Panel  Recent Labs   Lab Test 03/06/20  1416 10/15/18  1407 04/20/18   CHOL 124 130 228*   TRIG 125 75 245*   HDL 29* 55 61   LDL 70 60 118   NHDL 95 75 167*     Hepatitis B  Recent Labs   Lab Test 05/15/19  1409 04/22/19  1436   AUSAB  --  49.89*   HBCAB  --  Reactive*   HEPBANG  --  Nonreactive   HBQLOG Not Calculated  --      Hepatitis C  Recent Labs   Lab Test 04/22/19  1436   HCVAB  Nonreactive     Lyme ab screening  Recent Labs   Lab Test 04/22/19  1436   LYMEGM 0.09     Tuberculosis Screening  Recent Labs   Lab Test 04/22/19  1436   TBRES Negative     HIV Screening  Recent Labs   Lab Test 01/04/19  1416   HIAGAB Nonreactive       Immunization History     Immunization History   Administered Date(s) Administered     FLU 6-35 months 01/27/2010     Influenza (High Dose) 3 valent vaccine 10/07/2016     Influenza (IIV3) PF 10/29/2010, 11/17/2011     Influenza Quad, Recombinant, p-free (RIV4) 10/15/2018, 02/05/2020     Influenza Vaccine IM > 6 months Valent IIV4 01/28/2016, 10/07/2016, 10/18/2017     Influenza Vaccine IM Ages 6-35 Months 4 Valent (PF) 10/11/2013     Pneumo Conj 13-V (2010&after) 10/15/2018     Pneumococcal 23 valent 04/19/2016     TD (ADULT, 7+) 09/13/2016     Twinrix A/B 01/27/2010     Zoster vaccine recombinant adjuvanted (SHINGRIX) 02/05/2020          Chart documentation done in part with Dragon Voice recognition Software. Although reviewed after completion, some word and grammatical error may remain.    Phone call start time: 1:42 PM  Phone call end time: 1:59 PM    This visit is equivalent to a [id number] visit    Location of patient: home  Location of provider: home    Follow up:  follow up appointment scheduled to be in Sept    Quinten Wright MD  6/22/2020

## 2020-06-25 ENCOUNTER — ANCILLARY PROCEDURE (OUTPATIENT)
Dept: GENERAL RADIOLOGY | Facility: CLINIC | Age: 60
End: 2020-06-25
Attending: INTERNAL MEDICINE
Payer: COMMERCIAL

## 2020-06-25 DIAGNOSIS — G89.29 CHRONIC RIGHT SHOULDER PAIN: ICD-10-CM

## 2020-06-25 DIAGNOSIS — Z79.899 HIGH RISK MEDICATION USE: ICD-10-CM

## 2020-06-25 DIAGNOSIS — M25.511 CHRONIC RIGHT SHOULDER PAIN: ICD-10-CM

## 2020-06-25 DIAGNOSIS — M05.79 RHEUMATOID ARTHRITIS INVOLVING MULTIPLE SITES WITH POSITIVE RHEUMATOID FACTOR (H): ICD-10-CM

## 2020-06-25 LAB
ALBUMIN SERPL-MCNC: 4.2 G/DL (ref 3.4–5)
ALP SERPL-CCNC: 76 U/L (ref 40–150)
ALT SERPL W P-5'-P-CCNC: 48 U/L (ref 0–50)
AST SERPL W P-5'-P-CCNC: 22 U/L (ref 0–45)
BASOPHILS # BLD AUTO: 0 10E9/L (ref 0–0.2)
BASOPHILS NFR BLD AUTO: 0.1 %
BILIRUB DIRECT SERPL-MCNC: <0.1 MG/DL (ref 0–0.2)
BILIRUB SERPL-MCNC: 0.2 MG/DL (ref 0.2–1.3)
CREAT SERPL-MCNC: 1.01 MG/DL (ref 0.52–1.04)
CRP SERPL-MCNC: <2.9 MG/L (ref 0–8)
DIFFERENTIAL METHOD BLD: NORMAL
EOSINOPHIL # BLD AUTO: 0.1 10E9/L (ref 0–0.7)
EOSINOPHIL NFR BLD AUTO: 1.8 %
ERYTHROCYTE [DISTWIDTH] IN BLOOD BY AUTOMATED COUNT: 13.5 % (ref 10–15)
ERYTHROCYTE [SEDIMENTATION RATE] IN BLOOD BY WESTERGREN METHOD: 8 MM/H (ref 0–30)
GFR SERPL CREATININE-BSD FRML MDRD: 60 ML/MIN/{1.73_M2}
HCT VFR BLD AUTO: 39.7 % (ref 35–47)
HGB BLD-MCNC: 13.3 G/DL (ref 11.7–15.7)
LYMPHOCYTES # BLD AUTO: 2.8 10E9/L (ref 0.8–5.3)
LYMPHOCYTES NFR BLD AUTO: 38.3 %
MCH RBC QN AUTO: 30.6 PG (ref 26.5–33)
MCHC RBC AUTO-ENTMCNC: 33.5 G/DL (ref 31.5–36.5)
MCV RBC AUTO: 92 FL (ref 78–100)
MONOCYTES # BLD AUTO: 0.5 10E9/L (ref 0–1.3)
MONOCYTES NFR BLD AUTO: 6.6 %
NEUTROPHILS # BLD AUTO: 3.9 10E9/L (ref 1.6–8.3)
NEUTROPHILS NFR BLD AUTO: 53.2 %
PLATELET # BLD AUTO: 272 10E9/L (ref 150–450)
PROT SERPL-MCNC: 7.7 G/DL (ref 6.8–8.8)
RBC # BLD AUTO: 4.34 10E12/L (ref 3.8–5.2)
WBC # BLD AUTO: 7.3 10E9/L (ref 4–11)

## 2020-06-25 PROCEDURE — 73030 X-RAY EXAM OF SHOULDER: CPT | Mod: RT

## 2020-06-25 PROCEDURE — 80076 HEPATIC FUNCTION PANEL: CPT | Performed by: INTERNAL MEDICINE

## 2020-06-25 PROCEDURE — 86140 C-REACTIVE PROTEIN: CPT | Performed by: INTERNAL MEDICINE

## 2020-06-25 PROCEDURE — 82565 ASSAY OF CREATININE: CPT | Performed by: INTERNAL MEDICINE

## 2020-06-25 PROCEDURE — 85025 COMPLETE CBC W/AUTO DIFF WBC: CPT | Performed by: INTERNAL MEDICINE

## 2020-06-25 PROCEDURE — 85652 RBC SED RATE AUTOMATED: CPT | Performed by: INTERNAL MEDICINE

## 2020-06-25 PROCEDURE — 36415 COLL VENOUS BLD VENIPUNCTURE: CPT | Performed by: INTERNAL MEDICINE

## 2020-06-28 DIAGNOSIS — M05.79 RHEUMATOID ARTHRITIS INVOLVING MULTIPLE SITES WITH POSITIVE RHEUMATOID FACTOR (H): ICD-10-CM

## 2020-06-28 DIAGNOSIS — M25.511 CHRONIC RIGHT SHOULDER PAIN: Primary | ICD-10-CM

## 2020-06-28 DIAGNOSIS — G89.29 CHRONIC RIGHT SHOULDER PAIN: Primary | ICD-10-CM

## 2020-06-28 RX ORDER — ABATACEPT 125 MG/ML
125 INJECTION, SOLUTION SUBCUTANEOUS
Qty: 4 SYRINGE | Refills: 3 | Status: SHIPPED | OUTPATIENT
Start: 2020-06-28 | End: 2020-07-13

## 2020-06-28 NOTE — RESULT ENCOUNTER NOTE
Rheumatology team: Please call to notify Ms. Bagley that labs were okay. Orencia has been refilled.  X-ray of the right shoulder was normal; MRI has been ordered; please provide the phone number to call to schedule the MRI.  Quinten Wright MD  6/28/2020 9:16 AM

## 2020-06-29 ENCOUNTER — TELEPHONE (OUTPATIENT)
Dept: RHEUMATOLOGY | Facility: CLINIC | Age: 60
End: 2020-06-29

## 2020-06-29 NOTE — TELEPHONE ENCOUNTER
Called patient and relayed results and recommendations below. Patient agreed with the plan and will call to schedule the MRI.    Jaron Lala RN....6/29/2020 1:35 PM

## 2020-06-29 NOTE — TELEPHONE ENCOUNTER
Called patient and left message to return call to clinic to inform of results below:     labs were okay. Orencia has been refilled.  X-ray of the right shoulder was normal; MRI has been ordered; please provide the phone number to call to schedule the MRI. -Quinten Wright MD     Imaging phone:[phone]     Jaron Lala RN....6/29/2020 1:06 PM

## 2020-06-29 NOTE — TELEPHONE ENCOUNTER
"Called patient's boyfriend's phone three times and was disconnected. Patient answered the second time however phone cut off. Then called other number listed below \"[phone]\" and it did not work. RN will try calling again later.    Jaron Lala RN....6/29/2020 1:19 PM         "

## 2020-06-29 NOTE — TELEPHONE ENCOUNTER
Patient calling back, states she would like a call back on her husbands phone number.    Phone Number: [phone]   If that doesn't work it may be [phone]

## 2020-06-30 NOTE — PROGRESS NOTES
"Sirisha Bagley is a 60 year old female who presents to clinic today for the following health issues:    HPI     Non compliance has been an issue with clinic appointments etc -   I continue to try to motivate her.    Headache, poor balance, recurrent falls, psychomotor retardation - around 6/7/20 she hit her forehead on a cabinet. Since then she has had mild headaches. Also she says she fell 5 times. No major injuries. She says her balance is off \"as if I'm drunk.\" At baseline, slow moving, slow talking. But cognitively just fine.    This is usually what I would see with parkinson's but I previously thought hers is on the basis of her psychiatric issues and medications used to treat them.   At this time, she needs further evaluation. I ordered brain MRI for her - she will try to set it up on the same day as her shoulder MRI ordered by her rheumatologist.   I also referred her to neurology.    Abnormal CXR and CT - this was done due to cough.   Evaluation and treatment:   CXR today as below - treated with Zithromax.   Lung CT on 6/1/20 - resolving opacities suggesting infectious/inflammatory process.   She had virtual visit with lung nodule clinic - they felt this was related to her RA.    Results for orders placed or performed in visit on 03/06/20   XR Chest 2 Views     Status: None    Narrative    CHEST TWO VIEWS  3/6/2020 2:18 PM     HISTORY: 60-year-old woman with history of cough.    COMPARISON: None       Impression    IMPRESSION: Heart size is normal. Ovoid opacity in the right perihilar  location is identified on PA radiograph measuring up to 5.9 cm.  Suspect this is in the superior segment of right lower lobe on lateral  radiograph and may represent pneumonia. However, underlying mass could  not be excluded given ovoid shape. Therefore, recommend close  radiographic surveillance until resolution. Similarly, ovoid left  lower lobe retrocardiac well-defined opacity measures up to 7.4 x 4.1  cm. " "Also, this could represent pneumonia, though underlying mass is  possible. Both require close follow-up. Granuloma in the right lung is  incidentally noted.    IONA ROSENBAUM MD     Peripheral neuropathy - since around Oct 2018 she has had \"pins and needles\" in both feet. Sometimes comes up the legs. No focal weakness.   Evaluation and treatment:   B12 level normal at 347 today 1/4/18.   I think this may be related to her diabetes.   Gabapentin 400 mg tid - helps.    Previous GERD - she used to get heart burn a lot - mostly epigastric pain without any chest or throat burning.   Evaluation and treatment:    When she stopped her Plaquenil, her pain resolved.     RA -   Evaluation and treatment:    Followed previously with rheumatologist. Macy Felipe, [facility].   She is now following with our rheumatologist, Dr. Wright.   He has ordered a shoulder MRI for her.    Type 2 diabetes - dx'd around around age 55. Checks glucose about 3 times per day. Results are around  average. Denies symptoms of high or low glucose.   Evaluation and treatment:    Eye exam - per patient fine on 10/16/18. Advised to set up another one.   Foot exam - fine 2/5/20.   Urine albumin - negative 1/23/20.   ASA - allergic - hives   Metformin 850 mg bid - no side effects.   Glyburide stopped due to hypoglycemia.   Continue current tx.   Counseled about the various complications of diabetes, weight loss via diet, exercise, checking glucose levels, self foot check and managing hypoglycemia.    Lab Results   Component Value Date    A1C 6.5 07/01/2020    A1C 5.9 01/23/2020    A1C 5.8 05/15/2019    A1C 5.5 10/15/2018    A1C 5.6 04/20/2018       Last Comprehensive Metabolic Panel:  Sodium   Date Value Ref Range Status   01/23/2020 136 133 - 144 mmol/L Final     Potassium   Date Value Ref Range Status   01/23/2020 3.9 3.4 - 5.3 mmol/L Final     Chloride   Date Value Ref Range Status   01/23/2020 107 94 - 109 " mmol/L Final     Carbon Dioxide   Date Value Ref Range Status   01/23/2020 21 20 - 32 mmol/L Final     Anion Gap   Date Value Ref Range Status   01/23/2020 8 3 - 14 mmol/L Final     Glucose   Date Value Ref Range Status   01/23/2020 93 70 - 99 mg/dL Final     Comment:     Non Fasting     Urea Nitrogen   Date Value Ref Range Status   01/23/2020 13 7 - 30 mg/dL Final     Creatinine   Date Value Ref Range Status   06/25/2020 1.01 0.52 - 1.04 mg/dL Final     GFR Estimate   Date Value Ref Range Status   06/25/2020 60 (L) >60 mL/min/[1.73_m2] Final     Comment:     Non  GFR Calc  Starting 12/18/2018, serum creatinine based estimated GFR (eGFR) will be   calculated using the Chronic Kidney Disease Epidemiology Collaboration   (CKD-EPI) equation.       Calcium   Date Value Ref Range Status   01/23/2020 9.3 8.5 - 10.1 mg/dL Final     Overweight (previously Obesity) -   Evaluation and treatment:    Diet and exercise discussed.    Body mass index is 31.04 kg/m .  Wt Readings from Last 5 Encounters:   07/01/20 75.8 kg (167 lb)   03/09/20 69.9 kg (154 lb)   03/06/20 69.9 kg (154 lb)   02/05/20 76.2 kg (168 lb)   01/23/20 74.8 kg (165 lb)     Dyslipidemia - no h/o vascular disease like CAD, PAD, CVA but has h/o diabetes.   Evaluation and treatment:    Per ATP, moderate to high instensity statin recommended.   Counseling done today regarding healthy weight, diet and exercise.    Lipitor 40 mg every day - no side effects.   Continue same tx.     Recent Labs   Lab Test 03/06/20  1416 10/15/18  1407   CHOL 124 130   HDL 29* 55   LDL 70 60   TRIG 125 75     Lab Results   Component Value Date    ALT 37 10/21/2019     Hypothyroidism - denies thyroid type symptoms.  Evaluation and treatment:    Synthroid 100 mcg daily (reduced from 112 mcg) - no side effects.   We will continue with same dose for now.     TSH   Date Value Ref Range Status   01/23/2020 3.99 0.40 - 4.00 mU/L Final     T4 Free   Date Value Ref Range Status    05/15/2019 1.09 0.76 - 1.46 ng/dL Final     Depression, anxiety, panic, psychosis -   Evaluation and treatment:    followed with psychiatry but now she is switching - upcoming appointment assessment 5/31/19.    Klonopin 1 mg tid - I refilled short term with warnings.   Trazodone 200 mg at bedtime - I refilled short term.   Prozac 40 mg daily - I refilled short term.   This needs to be managed by psychiatry.    Tobacco abuse - started age 18, average 1 ppd, quit total of 18 years, quit and restarted. Quit again Jan 2019. Restarted around Dec 2019. This gives her about 24 pack year.   Evaluation and treatment:    Counseled and offered help to quit smoking - she declined at this time.   Does not qualify for lung CA screen - however given abnormal CXR, I ordered chest CT - see above.     Nasal congestion - this is chronic for her.  Evaluation and treatment:   Does not like Flonase.    She wants to try Claritin D which I approed.    Preventive -     Immunization History   Administered Date(s) Administered     FLU 6-35 months 01/27/2010     Influenza (High Dose) 3 valent vaccine 10/07/2016     Influenza (IIV3) PF 10/29/2010, 11/17/2011     Influenza Quad, Recombinant, p-free (RIV4) 10/15/2018, 02/05/2020     Influenza Vaccine IM > 6 months Valent IIV4 01/28/2016, 10/07/2016, 10/18/2017     Influenza Vaccine IM Ages 6-35 Months 4 Valent (PF) 10/11/2013     Pneumo Conj 13-V (2010&after) 10/15/2018     Pneumococcal 23 valent 04/19/2016     TD (ADULT, 7+) 09/13/2016     Twinrix A/B 01/27/2010     Zoster vaccine recombinant adjuvanted (SHINGRIX) 02/05/2020     -STD screen: negative 1/4/19.     -Mammogram: 3/6/20 - negative.    -PAP: repeat in 5 years.    Lab Results   Component Value Date    PAP NIL 03/06/2020     - Colon CA screen: declines Colonoscopy. Accepted FIT - negative 10/24/18. I previously ordered cologuard - but she says her dog ate it or something. I ordered another one for her previously and again today.    -  "Hep C screen: negative 4/22/19    - Advanced Directive: referred previously and today - she has life alert    - Lung cancer screen: per HPI    SH:    Marital status: , met new partner.  Kids: 1 daughter  Employment: disabled  Exercise: walking 3 times per week  Tobacco: per HPI  Etoh: no  Recreational drugs: no  Caffeine: no      OBJECTIVE:   /80   Pulse 83   Temp 98.3  F (36.8  C) (Tympanic)   Ht 1.562 m (5' 1.5\")   Wt 75.8 kg (167 lb)   SpO2 98%   BMI 31.04 kg/m    EXAM:  GENERAL: healthy, alert and no distress  EYES: Eyes grossly normal to inspection, PERRL and conjunctivae and sclerae normal  HENT: ear canals and TM's normal, nose and mouth without ulcers or lesions  NECK: no adenopathy, no asymmetry, masses, or scars and thyroid normal to palpation  RESP: good air movement, otherwise clear. O2 sat noted. No tachypnea or other signs of respiratory distress.  CV: regular rate and rhythm, normal S1 S2, no S3 or S4, no murmur, click or rub, no peripheral edema and peripheral pulses strong  ABDOMEN: soft, nontender, no hepatosplenomegaly, no masses and bowel sounds normal  MS: no gross musculoskeletal defects noted, no edema  SKIN: no suspicious lesions or rashes  NEURO: Normal strength and tone, mentation intact and speech normal  PSYCH: psychomotor retardation at baseline.    Assessment and Plan - Decision Making    1. Type 2 diabetes mellitus without complication, without long-term current use of insulin (H)    Per HPI    - Hemoglobin A1c    2. Poor balance    Per HPI    - MR Brain w/o Contrast; Future  - NEUROLOGY ADULT REFERRAL    3. Bradykinesia    Per HPI    - MR Brain w/o Contrast; Future  - NEUROLOGY ADULT REFERRAL    4. Nonintractable headache, unspecified chronicity pattern, unspecified headache type    Per HPI    - MR Brain w/o Contrast; Future  - NEUROLOGY ADULT REFERRAL      Written instructions given as follows:    Patient Instructions   1. Set up eye exam.    2. Mail in the stool " test.    3. Set up the MRI - [phone].    4. [phone]     5. See you in 6 months for a physical with fasting labs.

## 2020-07-01 ENCOUNTER — OFFICE VISIT (OUTPATIENT)
Dept: FAMILY MEDICINE | Facility: CLINIC | Age: 60
End: 2020-07-01
Payer: COMMERCIAL

## 2020-07-01 VITALS
OXYGEN SATURATION: 98 % | SYSTOLIC BLOOD PRESSURE: 120 MMHG | TEMPERATURE: 98.3 F | HEART RATE: 83 BPM | BODY MASS INDEX: 30.73 KG/M2 | DIASTOLIC BLOOD PRESSURE: 80 MMHG | HEIGHT: 62 IN | WEIGHT: 167 LBS

## 2020-07-01 DIAGNOSIS — R51.9 NONINTRACTABLE HEADACHE, UNSPECIFIED CHRONICITY PATTERN, UNSPECIFIED HEADACHE TYPE: ICD-10-CM

## 2020-07-01 DIAGNOSIS — R26.89 POOR BALANCE: ICD-10-CM

## 2020-07-01 DIAGNOSIS — E11.9 TYPE 2 DIABETES MELLITUS WITHOUT COMPLICATION, WITHOUT LONG-TERM CURRENT USE OF INSULIN (H): Primary | ICD-10-CM

## 2020-07-01 DIAGNOSIS — R25.8 BRADYKINESIA: ICD-10-CM

## 2020-07-01 LAB — HBA1C MFR BLD: 6.5 % (ref 0–5.6)

## 2020-07-01 PROCEDURE — 83036 HEMOGLOBIN GLYCOSYLATED A1C: CPT | Performed by: FAMILY MEDICINE

## 2020-07-01 PROCEDURE — 99215 OFFICE O/P EST HI 40 MIN: CPT | Performed by: FAMILY MEDICINE

## 2020-07-01 PROCEDURE — 36415 COLL VENOUS BLD VENIPUNCTURE: CPT | Performed by: FAMILY MEDICINE

## 2020-07-01 ASSESSMENT — MIFFLIN-ST. JEOR: SCORE: 1272.82

## 2020-07-01 NOTE — PATIENT INSTRUCTIONS
1. Set up eye exam.    2. Mail in the stool test.    3. Set up the MRI - [phone].    4. [phone]     5. See you in 6 months for a physical with fasting labs.

## 2020-07-01 NOTE — LETTER
July 2, 2020      Skylar Bagley  [address]  [place] [facility] [place] [address]        Dear ,    We are writing to inform you of your test results.    Your test results fall within the expected range(s) or remain unchanged from previous results.  Please continue with current treatment plan.    Resulted Orders   Hemoglobin A1c   Result Value Ref Range    Hemoglobin A1C 6.5 (H) 0 - 5.6 %      Comment:      Normal <5.7% Prediabetes 5.7-6.4%  Diabetes 6.5% or higher - adopted from ADA   consensus guidelines.         If you have any questions or concerns, please call the clinic at the number listed above.       Sincerely,        ISMAEL FRIEDMAN MD/cong

## 2020-07-06 ENCOUNTER — OFFICE VISIT (OUTPATIENT)
Dept: OPTOMETRY | Facility: CLINIC | Age: 60
End: 2020-07-06
Payer: COMMERCIAL

## 2020-07-06 DIAGNOSIS — H52.4 PRESBYOPIA: ICD-10-CM

## 2020-07-06 DIAGNOSIS — H04.123 DRY EYES: ICD-10-CM

## 2020-07-06 DIAGNOSIS — E11.9 TYPE 2 DIABETES MELLITUS WITHOUT COMPLICATION, WITHOUT LONG-TERM CURRENT USE OF INSULIN (H): Primary | ICD-10-CM

## 2020-07-06 DIAGNOSIS — H52.223 REGULAR ASTIGMATISM OF BOTH EYES: ICD-10-CM

## 2020-07-06 PROCEDURE — 92015 DETERMINE REFRACTIVE STATE: CPT | Mod: GY | Performed by: OPTOMETRIST

## 2020-07-06 PROCEDURE — 92004 COMPRE OPH EXAM NEW PT 1/>: CPT | Mod: GY | Performed by: OPTOMETRIST

## 2020-07-06 ASSESSMENT — VISUAL ACUITY
OD_PH_SC+: -1
OD_SC+: -2
OS_SC+: -1
OS_PH_SC+: -1
OS_PH_SC: 20/40
OD_PH_SC: 20/40
OD_SC: 20/40
OD_SC: 20/70-1
OS_SC: 20/70-1
METHOD: SNELLEN - LINEAR
OS_SC: 20/40

## 2020-07-06 ASSESSMENT — KERATOMETRY
OD_AXISANGLE2_DEGREES: 172
OS_K2POWER_DIOPTERS: 47.50
OD_K1POWER_DIOPTERS: 44.75
OS_AXISANGLE2_DEGREES: 9
OD_K2POWER_DIOPTERS: 48.00
OS_K1POWER_DIOPTERS: 44.75

## 2020-07-06 ASSESSMENT — REFRACTION_WEARINGRX
SPECS_TYPE: OTC READERS
OD_SPHERE: +1.75

## 2020-07-06 ASSESSMENT — REFRACTION_MANIFEST
OD_SPHERE: -0.75
OS_ADD: +2.00
OD_ADD: +2.00
OS_CYLINDER: +1.50
OS_AXIS: 110
OD_CYLINDER: +2.00
OD_CYLINDER: +1.50
OS_AXIS: 110
OS_SPHERE: -0.50
OS_CYLINDER: +2.00
OD_SPHERE: -1.00
OS_SPHERE: -1.00
OD_AXIS: 080
OD_AXIS: 084

## 2020-07-06 ASSESSMENT — EXTERNAL EXAM - LEFT EYE: OS_EXAM: NORMAL

## 2020-07-06 ASSESSMENT — EXTERNAL EXAM - RIGHT EYE: OD_EXAM: NORMAL

## 2020-07-06 ASSESSMENT — TONOMETRY
OD_IOP_MMHG: 12
OS_IOP_MMHG: 12
IOP_METHOD: APPLANATION

## 2020-07-06 ASSESSMENT — CUP TO DISC RATIO
OD_RATIO: 0.4
OS_RATIO: 0.4

## 2020-07-06 ASSESSMENT — SLIT LAMP EXAM - LIDS
COMMENTS: NORMAL
COMMENTS: NORMAL

## 2020-07-06 ASSESSMENT — CONF VISUAL FIELD
OD_NORMAL: 1
OS_NORMAL: 1
METHOD: COUNTING FINGERS

## 2020-07-06 NOTE — PATIENT INSTRUCTIONS
Patient was advised of today's exam findings.  Fill glasses prescription for reading glasses   Use over the counter artificial tears 2-3 times a day (Thera Tears or Thera Tears Extra, Systane Ultra or Refresh Optive)  Keep blood sugar under good control  Return in 1 year for diabetic eye exam      Diabetes weakens the blood vessels all over the body, including the eyes. Damage to the blood vessels in the eyes can cause swelling or bleeding into part of the eye (called the retina). This is called diabetic retinopathy (ROXANA-tin--[facility]-thee). If not treated, this disease can cause vision loss or blindness.   Symptoms may include blurred or distorted vision, but many people have no symptoms. It's important to see your eye doctor regularly to check for problems.   Early treatment and good control can help protect your vision. Here are the things you can do to help prevent vision loss:      1. Keep your blood sugar levels under tight control.      2. Bring high blood pressure under control.      3. No smoking.      4. Have yearly dilated eye exams.      Shanta Velarde O.D.  [facility]   [address] [place], [place] [id number] [phone]

## 2020-07-06 NOTE — PROGRESS NOTES
"Chief Complaint   Patient presents with     Diabetic Eye Exam   diabetes type 2, X 4yrs      Hemoglobin A1C   Date Value Ref Range Status   07/01/2020 6.5 (H) 0 - 5.6 % Final     Comment:     Normal <5.7% Prediabetes 5.7-6.4%  Diabetes 6.5% or higher - adopted from ADA   consensus guidelines.     01/23/2020 5.9 (H) 0 - 5.6 % Final     Comment:     Normal <5.7% Prediabetes 5.7-6.4%  Diabetes 6.5% or higher - adopted from ADA   consensus guidelines.     05/15/2019 5.8 (H) 0 - 5.6 % Final     Comment:     Normal <5.7% Prediabetes 5.7-6.4%  Diabetes 6.5% or higher - adopted from ADA   consensus guidelines.           Last Eye Exam: 2 years ago   Dilated Previously: Yes    What are you currently using to see?  Readers, did not bring them to the appointment, thinks that +1.75's     Distance Vision Acuity: Noticed gradual change in both eyes, things are starting to get \"foggy'    Near Vision Acuity: Satisfied with vision while reading and using computer with readers, without them she has to hold things close     Eye Comfort: watery and foggy , has dry mouth from medication  Do you use eye drops? : No  Occupation or Hobbies: Disability for [place]     Kait Apple Optometric Assistant      Medical, surgical and family histories reviewed and updated 7/6/2020.       OBJECTIVE: See Ophthalmology exam    ASSESSMENT:    ICD-10-CM    1. Type 2 diabetes mellitus without complication, without long-term current use of insulin (H)  E11.9    2. Dry eyes  H04.123    3. Regular astigmatism of both eyes  H52.223    4. Presbyopia  H52.4       PLAN:    Skylar Bagley aware  eye exam results will be sent to Toñito Cortez.  Patient Instructions   Patient was advised of today's exam findings.  Fill glasses prescription for reading glasses   Use over the counter artificial tears 2-3 times a day (Thera Tears or Thera Tears Extra, Systane Ultra or Refresh Optive)  Keep blood sugar under good control  Return in 1 year for diabetic eye " exam      Diabetes weakens the blood vessels all over the body, including the eyes. Damage to the blood vessels in the eyes can cause swelling or bleeding into part of the eye (called the retina). This is called diabetic retinopathy (ROXANA-tin-AH-puh-thee). If not treated, this disease can cause vision loss or blindness.   Symptoms may include blurred or distorted vision, but many people have no symptoms. It's important to see your eye doctor regularly to check for problems.   Early treatment and good control can help protect your vision. Here are the things you can do to help prevent vision loss:      1. Keep your blood sugar levels under tight control.      2. Bring high blood pressure under control.      3. No smoking.      4. Have yearly dilated eye exams.      Shanta Velarde O.D.  [facility]   [address] [place], [place] [id number] [phone]

## 2020-07-06 NOTE — LETTER
7/6/2020         RE: Skylar Bagley  [address]  [place] [address]        Dear Colleague,    Thank you for referring your patient, Skylar Bagley, to the [facility]. No diabetic retinopathy was found at eye exam.       Again, thank you for allowing me to participate in the care of your patient.        Sincerely,        Shanta Velarde, OD

## 2020-07-09 DIAGNOSIS — M05.79 RHEUMATOID ARTHRITIS INVOLVING MULTIPLE SITES WITH POSITIVE RHEUMATOID FACTOR (H): ICD-10-CM

## 2020-07-09 NOTE — TELEPHONE ENCOUNTER
Requested Prescriptions   Pending Prescriptions Disp Refills     Abatacept (ORENCIA CLICKJECT) 125 MG/ML SOAJ auto-injector 4 Syringe 3     Sig: Inject 1 mL (125 mg) Subcutaneous every 7 days ; refills at clinic visits only.   Last Written Prescription Date:  ?  Last Fill Quantity: ?,  # refills: 3   Last office visit: 3/9/2020 with prescribing provider:  3-9-20   Future Office Visit:   Next 5 appointments (look out 90 days)    Sep 28, 2020  2:40 PM CDT  Return Visit with Quinten Wright MD  [facility] ([facility] [address] [facility] [address]  [phone]                 There is no refill protocol information for this order

## 2020-07-13 RX ORDER — ABATACEPT 125 MG/ML
125 INJECTION, SOLUTION SUBCUTANEOUS
Qty: 4 SYRINGE | Refills: 3 | Status: SHIPPED | OUTPATIENT
Start: 2020-07-13 | End: 2020-07-20

## 2020-07-13 NOTE — TELEPHONE ENCOUNTER
Rheumatology team: Please call to notify MsJanell Wilkersonabebaveronicaaraceli that Orencia has been refilled.  Quinten Wright MD  7/13/2020 2:49 PM

## 2020-07-14 ENCOUNTER — ANCILLARY PROCEDURE (OUTPATIENT)
Dept: MRI IMAGING | Facility: CLINIC | Age: 60
End: 2020-07-14
Attending: FAMILY MEDICINE
Payer: COMMERCIAL

## 2020-07-14 ENCOUNTER — ANCILLARY PROCEDURE (OUTPATIENT)
Dept: MRI IMAGING | Facility: CLINIC | Age: 60
End: 2020-07-14
Attending: INTERNAL MEDICINE
Payer: COMMERCIAL

## 2020-07-14 DIAGNOSIS — R25.8 BRADYKINESIA: ICD-10-CM

## 2020-07-14 DIAGNOSIS — M25.511 CHRONIC RIGHT SHOULDER PAIN: ICD-10-CM

## 2020-07-14 DIAGNOSIS — G89.29 CHRONIC RIGHT SHOULDER PAIN: ICD-10-CM

## 2020-07-14 DIAGNOSIS — R26.89 POOR BALANCE: ICD-10-CM

## 2020-07-14 DIAGNOSIS — R51.9 NONINTRACTABLE HEADACHE, UNSPECIFIED CHRONICITY PATTERN, UNSPECIFIED HEADACHE TYPE: ICD-10-CM

## 2020-07-14 PROCEDURE — 70551 MRI BRAIN STEM W/O DYE: CPT | Mod: TC

## 2020-07-14 PROCEDURE — 73221 MRI JOINT UPR EXTREM W/O DYE: CPT | Mod: TC

## 2020-07-15 ENCOUNTER — TRANSFERRED RECORDS (OUTPATIENT)
Dept: HEALTH INFORMATION MANAGEMENT | Facility: CLINIC | Age: 60
End: 2020-07-15

## 2020-07-16 ENCOUNTER — TELEPHONE (OUTPATIENT)
Dept: FAMILY MEDICINE | Facility: CLINIC | Age: 60
End: 2020-07-16

## 2020-07-16 NOTE — TELEPHONE ENCOUNTER
I just called and left message on voicemail that I will call back later today or tomorrow.    Toñito Cortez M.D.

## 2020-07-17 DIAGNOSIS — M05.79 RHEUMATOID ARTHRITIS INVOLVING MULTIPLE SITES WITH POSITIVE RHEUMATOID FACTOR (H): ICD-10-CM

## 2020-07-17 NOTE — TELEPHONE ENCOUNTER
Pending Prescriptions:                       Disp   Refills    Abatacept (ORENCIA CLICKJECT) 125 MG/ML S*4 Syri*3            Sig: Inject 1 mL (125 mg) Subcutaneous every 7 days ;           refills at clinic visits only.    Shari MOORE MA

## 2020-07-20 ENCOUNTER — TELEPHONE (OUTPATIENT)
Dept: RHEUMATOLOGY | Facility: CLINIC | Age: 60
End: 2020-07-20

## 2020-07-20 DIAGNOSIS — M25.511 CHRONIC RIGHT SHOULDER PAIN: Primary | ICD-10-CM

## 2020-07-20 DIAGNOSIS — G89.29 CHRONIC RIGHT SHOULDER PAIN: Primary | ICD-10-CM

## 2020-07-20 RX ORDER — ABATACEPT 125 MG/ML
125 INJECTION, SOLUTION SUBCUTANEOUS
Qty: 4 ML | Refills: 3 | Status: SHIPPED | OUTPATIENT
Start: 2020-07-20 | End: 2020-09-28

## 2020-07-20 NOTE — TELEPHONE ENCOUNTER
Rheumatology team: Please call to notify Ms. Kennydel that Orencia has been refilled.  Quinten Wright MD  7/20/2020 6:27 AM

## 2020-07-20 NOTE — TELEPHONE ENCOUNTER
I just called again and left message I will try her again later today or tomorrow.    Tñoito Cortez M.D.

## 2020-07-20 NOTE — TELEPHONE ENCOUNTER
Called patient and left VM, notified that refill has been approved and sent to her pharmacy.     Shari MOORE MA

## 2020-07-20 NOTE — TELEPHONE ENCOUNTER
Reason for Call:  Other call back    Detailed comments: patient would like a call back @ [phone] with results of MRI    Phone Number Patient can be reached at: Cell number on file:    Telephone Information:   Mobile [phone]       Best Time: any    Can we leave a detailed message on this number? YES    Call taken on 7/20/2020 at 9:29 AM by Rachel Mathias

## 2020-07-20 NOTE — TELEPHONE ENCOUNTER
Message left for patient that a prescription has been refilled and sent to pharmacy, any questions please call us at [phone].     Sheila Hough CMA Rheumatology  7/20/2020 3:00 PM

## 2020-07-23 NOTE — TELEPHONE ENCOUNTER
Forwarded to Dr. Wright to review and advise on shoulder MRI.    Jaron Lala RN....7/23/2020 8:11 AM

## 2020-07-24 NOTE — TELEPHONE ENCOUNTER
Patient returned the call and was informed of the results and recommendations below. Patient agreed with the plan and was scheduled an apt with orthopedics on 7/27/20.    Jaron Lala RN....7/24/2020 9:02 AM

## 2020-07-24 NOTE — TELEPHONE ENCOUNTER
RN: Please call to notify Ms. Bagley that the right shoulder MRI showed several areas of rotator cuff tears and bursitis; also findings that can seen in adhesive capsulitis.  For this I recommend that she see orthopedics and a referral is placed.  She should call [phone] to schedule an appointment.       Quinten Wright MD  7/23/2020

## 2020-07-24 NOTE — TELEPHONE ENCOUNTER
Called patient however there was no answer.   Left message for patient to return call to clinic.    Jaron Lala RN....7/24/2020 8:50 AM

## 2020-07-27 ENCOUNTER — OFFICE VISIT (OUTPATIENT)
Dept: ORTHOPEDICS | Facility: CLINIC | Age: 60
End: 2020-07-27
Payer: COMMERCIAL

## 2020-07-27 VITALS
DIASTOLIC BLOOD PRESSURE: 79 MMHG | BODY MASS INDEX: 31.65 KG/M2 | SYSTOLIC BLOOD PRESSURE: 116 MMHG | HEIGHT: 62 IN | HEART RATE: 85 BPM | WEIGHT: 172 LBS

## 2020-07-27 DIAGNOSIS — M54.2 NECK PAIN: ICD-10-CM

## 2020-07-27 DIAGNOSIS — M25.511 CHRONIC RIGHT SHOULDER PAIN: ICD-10-CM

## 2020-07-27 DIAGNOSIS — M75.81 ROTATOR CUFF TENDONITIS, RIGHT: ICD-10-CM

## 2020-07-27 DIAGNOSIS — M75.111 NONTRAUMATIC INCOMPLETE TEAR OF RIGHT ROTATOR CUFF: ICD-10-CM

## 2020-07-27 DIAGNOSIS — M75.51 SUBACROMIAL BURSITIS OF RIGHT SHOULDER JOINT: Primary | ICD-10-CM

## 2020-07-27 DIAGNOSIS — G89.29 CHRONIC RIGHT SHOULDER PAIN: ICD-10-CM

## 2020-07-27 PROCEDURE — 99203 OFFICE O/P NEW LOW 30 MIN: CPT | Mod: 25 | Performed by: ORTHOPAEDIC SURGERY

## 2020-07-27 PROCEDURE — 20610 DRAIN/INJ JOINT/BURSA W/O US: CPT | Mod: RT | Performed by: ORTHOPAEDIC SURGERY

## 2020-07-27 RX ORDER — TRIAMCINOLONE ACETONIDE 40 MG/ML
80 INJECTION, SUSPENSION INTRA-ARTICULAR; INTRAMUSCULAR
Status: SHIPPED | OUTPATIENT
Start: 2020-07-27

## 2020-07-27 RX ORDER — BUPIVACAINE HYDROCHLORIDE 2.5 MG/ML
4 INJECTION, SOLUTION INFILTRATION; PERINEURAL
Status: SHIPPED | OUTPATIENT
Start: 2020-07-27

## 2020-07-27 RX ADMIN — BUPIVACAINE HYDROCHLORIDE 4 ML: 2.5 INJECTION, SOLUTION INFILTRATION; PERINEURAL at 09:11

## 2020-07-27 RX ADMIN — TRIAMCINOLONE ACETONIDE 80 MG: 40 INJECTION, SUSPENSION INTRA-ARTICULAR; INTRAMUSCULAR at 09:11

## 2020-07-27 ASSESSMENT — PAIN SCALES - GENERAL: PAINLEVEL: WORST PAIN (10)

## 2020-07-27 ASSESSMENT — MIFFLIN-ST. JEOR: SCORE: 1295.5

## 2020-07-27 NOTE — LETTER
"    7/27/2020         RE: Skylar Bagley  [address]  [facility] [address]        Dear Colleague,    Thank you for referring your patient, Skylar Bagley, to the [facility] [place]. Please see a copy of my visit note below.    CHIEF COMPLAINT:   Chief Complaint   Patient presents with     Right Shoulder - Pain     Onset: end of April 2020. NKI. Pain just came on and had kept getting worse and worse. She thought she was having a flare up from RA. She tried prednisone but as soon as she was done with it the pain would come back. Pain is in the      Shoulder Pain     upper arm. No tx.   .  Skylar Bagley is seen today in the [facility] for evaluation of right shoulder pain at the request of Dr. Quinten Wright MD      HISTORY:  Skylar Bagley is a 60 year old female, right  -hand dominant, who is seen in consultation at the request of Dr. Wright for right shoulder pain that started  ~3 months ago, 4/2020, without injury. Pain just started and has been getting worse. She has rheumatoid arthritis and thought it was a flare. She tried oral prednisone which helped, but as soon as it was finished the pain returned. Locates pain in the shoulder/side of the arm, up in the neck and  Radiates down the  Arm to the wrist. Not sleeping well, constant tossing and turning, but when asleep no pain. Pain to touch at times. Has numbness and tingling in the hand at times at night. Turning neck causes pain in the neck, shoulder.    Balance issues, falls.     Onset: 4/2020 without incident  Symptoms have been worsening since that time.  Aggrevated by: laying down, using arm, using fingers  Relieved by: \"nothing\"  Present symptoms: pain with ADL's (dressing),  pain with overhead activities,  pain reaching behind back,  pain reaching out or away from body (flexion/ abduction),  positional night pain, sometimes, but not painful when asleep  pain lifting,  weakness " with lifting,  Pain location: lateral shoulder and deltoid and upper arm  Pain severity: 10/10  Pain quality: dull, aching and sharp  Frequency of symptoms: are constant  Associated symptoms: neck pain, radiating pain to the wrist, numbness and tingling.    Treatment up to this point: oral prednisone. Ibuprofen 800mg twice daily. Icy hot.  Has not tried: ice, Heat, Tylenol, PT and Corticosteroid injection  Prior history of related problems: no prior problems with this area in the past      Other PMH:  has a past medical history of Arthritis, Depressive disorder, Diabetes (H), and Thyroid disease.  Patient Active Problem List   Diagnosis     Type 2 diabetes mellitus without complication, without long-term current use of insulin (H)     Dyslipidemia     Class 1 obesity with serious comorbidity and body mass index (BMI) of 31.0 to 31.9 in adult, unspecified obesity type     Hypothyroidism, unspecified type     Gastroesophageal reflux disease, esophagitis presence not specified     Rheumatoid arthritis, involving unspecified site, unspecified rheumatoid factor presence (H)     Rheumatoid arthritis involving multiple sites with positive rheumatoid factor (H)       Surgical Hx:  has a past surgical history that includes Vag deliv only, prev c-sectn.    Medications:   Current Outpatient Medications:      Abatacept (ORENCIA CLICKJECT) 125 MG/ML SOAJ auto-injector, Inject 1 mL (125 mg) Subcutaneous every 7 days ; refills at clinic visits only., Disp: 4 mL, Rfl: 3     ACE/ARB/ARNI NOT PRESCRIBED, INTENTIONAL,, Please choose reason not prescribed, below, Disp: , Rfl:      ASPIRIN NOT PRESCRIBED (INTENTIONAL), Please choose reason not prescribed, below, Disp: 1 each, Rfl: 0     atorvastatin (LIPITOR) 40 MG tablet, Take 1 tablet (40 mg) by mouth daily, Disp: 90 tablet, Rfl: 3     azithromycin (ZITHROMAX) 250 MG tablet, Two tablets first day, then one tablet daily for four days., Disp: 6 tablet, Rfl: 0     blood glucose  monitoring (CONTOUR NEXT TEST) test strip, TEST 4 TIMES A DAY, Disp: , Rfl:      calcium carbonate 500 mg-vitamin D 200 units (OSCAL WITH D;OYSTER SHELL CALCIUM) 500-200 MG-UNIT per tablet, Take 1 tablet by mouth, Disp: , Rfl:      clonazePAM (KLONOPIN) 1 MG tablet, Take 1 tablet (1 mg) by mouth 2 times daily as needed for anxiety, Disp: 30 tablet, Rfl: 0     FLUoxetine (PROZAC) 40 MG capsule, TAKE 1 CAPSULE (40 MG) BY MOUTH DAILY, Disp: 30 capsule, Rfl: 0     fluticasone (FLONASE) 50 MCG/ACT nasal spray, Spray 1 spray into both nostrils daily, Disp: 32 g, Rfl: 0     gabapentin (NEURONTIN) 400 MG capsule, Take 1 capsule (400 mg) by mouth 3 times daily, Disp: 270 capsule, Rfl: 2     ibuprofen (IBU) 800 MG tablet, TAKE 1 TABLET TWICE DAILY AS NEEDED FOR  MODERATE  PAIN, Disp: 90 tablet, Rfl: 0     Lancets 30G MISC, Test 4 times per day. ICD-10 code E11.9, Disp: , Rfl:      levothyroxine (SYNTHROID/LEVOTHROID) 100 MCG tablet, TAKE 1 TABLET EVERY DAY, Disp: 90 tablet, Rfl: 3     loratadine-pseudoePHEDrine (CLARITIN-D 12-HOUR) 5-120 MG 12 hr tablet, Take 1 tablet by mouth 2 times daily, Disp: 60 tablet, Rfl: 2     metFORMIN (GLUCOPHAGE) 850 MG tablet, TAKE 1 TABLET TWICE DAILY WITH MEALS, Disp: 180 tablet, Rfl: 0     predniSONE (DELTASONE) 5 MG tablet, Prednisone 20mg daily x5days, then 15mg daily x5days, then 10mg daily x5days, then 5mg daily x5days, then stop., Disp: 50 tablet, Rfl: 0     traZODone (DESYREL) 100 MG tablet, TAKE 2 TABLETS (200 MG) BY MOUTH AT NIGHT AS NEEDED FOR SLEEP   (SUBSTITUTED FOR DESYREL) (Patient taking differently: At Bedtime ), Disp: 180 tablet, Rfl: 0     predniSONE (DELTASONE) 5 MG tablet, Prednisone 20mg daily x5days, then 15mg daily x5days, then 10mg daily x5days, then 5mg daily x5days, then stop. (Patient not taking: Reported on 7/27/2020), Disp: 50 tablet, Rfl: 0     risperiDONE (RISPERDAL) 3 MG tablet, Take 1 tablet (3 mg) by mouth 2 times daily (Patient not taking: Reported on  "7/27/2020), Disp: 90 tablet, Rfl: 0    Allergies:   Allergies   Allergen Reactions     Asa [Aspirin]      Methotrexate Other (See Comments)       Social Hx:  reports that she has been smoking cigarettes. She has a 10.00 pack-year smoking history. She has never used smokeless tobacco. She reports that she does not drink alcohol or use drugs.    Family Hx: family history includes Glaucoma in her maternal grandfather..    REVIEW OF SYSTEMS: 10 point ROS neg other than the symptoms noted above in the HPI and PMH. Notables include  CONSTITUTIONAL:NEGATIVE for fever, chills, change in weight  INTEGUMENTARY/SKIN: NEGATIVE for worrisome rashes, moles or lesions  MUSCULOSKELETAL:See HPI above  NEURO: NEGATIVE for weakness, dizziness or paresthesias    PHYSICAL EXAM:  /79   Pulse 85   Ht 1.562 m (5' 1.5\")   Wt 78 kg (172 lb)   BMI 31.97 kg/m     GENERAL APPEARANCE: healthy, alert, no distress  SKIN: no suspicious lesions or rashes  NEURO: Normal strength and tone, mentation intact and speech normal  PSYCH:  mentation appears normal and affect normal, not anxious  RESPIRATORY: No increased work of breathing.  VASCULAR: Radial pulses 2+ and brisk cappillary refill   LYMPH: no palpable axillary lymphadenopathy or cervical neck lymphadenopathy.      MUSCULOSKELETAL:    NECK:  Cervical range of motion: limited, causes pain in neck, causes pain into shoulder  Posterior cervical spine tender to palpation over midline bony prominences  There is tender to palpation along neck paraspinals and trapezius muscles      RIGHT UPPER EXTREMITY:  Sensation intact to light touch in median, radial, ulnar and axillary nerve distributions  Palpable 2+ radial pulse, brisk capillary refill to all fingers, wwp  Intact epl fpl fdp edc wrist flexion/extension biceps triceps deltoid    RIGHT SHOULDER:  Shoulder Inspection: no swelling, bruising, discoloration, or obvious deformity or asymmetry  Tender: AC joint, acromion, anterior capsule, " proximal bicep tendon, greater tuberosity, proximal humerus, supraspinatus , infraspinatus, upper trapezius muscle, rhomboids, deltoid, arm, elbow, forearm  Non-tender: SC joint  Range of Motion:   Active:forward flexion 160 degrees, external rotation  60 degrees, internal rotation  hip pocket  Strength: forward flexion 4+/5, painful, limited by pain, External rotation 4+/5, limited by pain  Liftoff: Able  Impingement: all grade 2 positive  Special tests: Belly Press: Negative  Empty Can: Negative    LEFT UPPER EXTREMITY:  Sensation intact to light touch in median, radial, ulnar and axillary nerve distributions  Palpable 2+ radial pulse, brisk capillary refill to all fingers, wwp  Intact epl fpl fdp edc wrist flexion/extension biceps triceps deltoid    LEFT SHOULDER:  Shoulder Inspection: no swelling, bruising, discoloration, or obvious deformity or asymmetry  Tender: AC joint, acromion, anterior capsule, proximal bicep tendon, greater tuberosity, proximal humerus, supraspinatus , infraspinatus, upper trapezius muscle and rhomboids  Non-tender: SC joint  Range of Motion:   Active:forward flexion 160 degrees, external rotation  60 degrees, internal rotation just below bra line.  Strength: forward flexion 5-/5, External rotation 5-/5  Liftoff: Able  Impingement: equivocal.  Special tests: Belly Press: Negative  Empty Can: Negative      X-RAY INTERPRETATION: internal rotation, external rotation, Y views right  shoulder previously obtained 6/25/2020 were reviewed personally in clinic today with the patient. On my review, mild acromio-clavicular degenerative changes. Lung granuloma      MRI right  shoulder:  7/14/2020  1. On a background of tendinosis, moderate grade bursal sided tear of  the anterior supraspinatus at the footprint.     2. Moderate to high-grade bursal sided tearing of the junctional  fibers of the posterior supraspinatus and anterior infraspinatus at  the musculotendinous junction.     3.  Subacromial-subdeltoid and subcoracoid bursitis. This may be  related to the patient's history of rheumatoid arthritis.     4. Effacement of the coracohumeral ligament, which can be seen in the  clinical entity of adhesive capsulitis.        ASSESSMENT: Skylar Bagley is a 60 year old female, right  -hand dominant with subacute right shoulder pain, impingement, subacromial bursitis and rotator cuff tendonitis, partial thickness supraspinatus tear, neck pain.      PLAN:   * Reviewed imaging studies with patient. Also, clinical exam findings. Consistent with impingement, bursitis/tendonitis, radicular neck pain.  * pain down the neck and arm to the wrist likely from the neck.    Treatment:    * Rest  * Activity modification - avoid activities that aggravate symptoms or started symptoms at onset.  * NSAIDS - regular use for inflammation, with food, as long as no contra-indications. Please discuss with pcp if needed.  * Ice twice daily to three times daily, 15-20 minutes at a time  * heat may be beneficial prior to exercising  * Physical Therapy for strengthening, stretching and range of motion exercises of rotator cuff and periscapular stabilization.  * Tylenol as needed for pain  * Injections: cortisone injections may be beneficial to help decrease swelling and inflammation within the shoulder or bursa, and decrease pain. With decreased pain, Physical Therapy and exercises will be more effective and efficient. Patient elected to proceed.  * Return to clinic as needed.        Rell Baptiste M.D., M.S.  Dept. of Orthopaedic Surgery  [facility]    Large Joint Injection/Arthocentesis: R subacromial bursa    Date/Time: 7/27/2020 9:11 AM  Performed by: Catracho Pearson PA  Authorized by: Rell Baptiste MD     Indications:  Pain  Indications comment:  Impingement  Needle Size:  22 G  Guidance: landmark guided    Approach:  Posterolateral  Location:  Shoulder      Site:  R subacromial  bursa  Medications:  80 mg triamcinolone 40 MG/ML; 4 mL bupivacaine 0.25 %  Outcome:  Tolerated well, no immediate complications  Procedure discussed: discussed risks, benefits, and alternatives    Consent Given by:  Patient  Prep: patient was prepped and draped in usual sterile fashion            Again, thank you for allowing me to participate in the care of your patient.        Sincerely,        Rell Baptiste MD

## 2020-07-28 ENCOUNTER — TELEPHONE (OUTPATIENT)
Dept: FAMILY MEDICINE | Facility: CLINIC | Age: 60
End: 2020-07-28

## 2020-07-28 NOTE — TELEPHONE ENCOUNTER
Reason for Call:  Request for results:    Name of test or procedure: MRI    Date of test of procedure: 07/14/2020    Location of the test or procedure: [facility] to leave the result message on voice mail or with a family member? YES    Phone number Patient can be reached at:  Home number on file [phone] (home)    Additional comments: na    Call taken on 7/28/2020 at 8:16 AM by Rachel Mathias

## 2020-07-29 NOTE — TELEPHONE ENCOUNTER
"Call to patient.  She states did not receive message from Dr. Cortez stating \"mri is normal\"  States had a message to call him back.  Patient aware per Dr. Cortez's note: \"Mri is normal\".  States understanding.  Jennifer Marsh RN    "

## 2020-07-29 NOTE — TELEPHONE ENCOUNTER
Reason for Call:  Other call back    Detailed comments: patient is calling stating has been calling everyday for results for last week, would like a call back for MRI results. Please call to discuss. Thank you.     Phone Number Patient can be reached at: Home number on file [phone] (home)    Best Time:     Can we leave a detailed message on this number? YES    Call taken on 7/29/2020 at 10:02 AM by Obdulia Angelo

## 2020-07-30 NOTE — TELEPHONE ENCOUNTER
I called and left message again that MRI is normal.    If she needs to talk to me, the best thing is to make telephone or video visit. I left message to that effect.    Toñito Cortez M.D.

## 2020-08-26 ENCOUNTER — TRANSFERRED RECORDS (OUTPATIENT)
Dept: HEALTH INFORMATION MANAGEMENT | Facility: CLINIC | Age: 60
End: 2020-08-26

## 2020-09-28 ENCOUNTER — VIRTUAL VISIT (OUTPATIENT)
Dept: RHEUMATOLOGY | Facility: CLINIC | Age: 60
End: 2020-09-28
Payer: COMMERCIAL

## 2020-09-28 DIAGNOSIS — M05.79 RHEUMATOID ARTHRITIS INVOLVING MULTIPLE SITES WITH POSITIVE RHEUMATOID FACTOR (H): ICD-10-CM

## 2020-09-28 PROCEDURE — 99213 OFFICE O/P EST LOW 20 MIN: CPT | Mod: 95 | Performed by: INTERNAL MEDICINE

## 2020-09-28 RX ORDER — ABATACEPT 125 MG/ML
125 INJECTION, SOLUTION SUBCUTANEOUS
Qty: 4 ML | Refills: 3 | Status: SHIPPED | OUTPATIENT
Start: 2020-09-28 | End: 2020-12-28

## 2020-09-28 NOTE — PROGRESS NOTES
"Skylar Bagley is a 60 year old female who is being evaluated via a billable telephone visit.      The patient has been notified of following:     \"This telephone visit will be conducted via a call between you and your physician/provider. We have found that certain health care needs can be provided without the need for a physical exam.  This service lets us provide the care you need with a short phone conversation.  If a prescription is necessary we can send it directly to your pharmacy.  If lab work is needed we can place an order for that and you can then stop by our lab to have the test done at a later time.    Telephone visits are billed at different rates depending on your insurance coverage. During this emergency period, for some insurers they may be billed the same as an in-person visit.  Please reach out to your insurance provider with any questions.    If during the course of the call the physician/provider feels a telephone visit is not appropriate, you will not be charged for this service.\"    Patient has given verbal consent for Telephone visit?  Yes    What phone number would you like to be contacted at? [phone]    How would you like to obtain your AVS? [facility]    Rheumatology Telephone/Telehealth  Visit      Skylar Bagley MRN# [id number]   YOB: 1960 Age: 60 year old      Date of visit: 9/28/20   PCP: Dr. Toñito Cortez    Chief Complaint   Patient presents with:  RECHECK      Assessment and Plan     1.  Rheumatoid arthritis (, CCP >340): Reportedly diagnosed prior to 1995.  Established care with me on 4/22/2019.  Previously treated with methotrexate (LFT elevation), hydroxychloroquine (retinal toxicity), Enbrel (partially effective), leflunomide (stopped by patient due to reported associated alopecia), Humira (partially effective).  Mild synovitis on exam previously and there was a hx of clinic and toxicity monitoring nonadherence; Humira was stopped and Orencia started.  " Currently doing well on Orencia monotherapy with the exception of her right shoulder; see #4  - Continue Orencia 125 mg SQ every 7 days; refill after labs are completed to ensure safety    2. Fibromyalgia: management per PCP    3. Primary osteoarthritis of both hands: evidenced by Heberden's and Yasmin's nodes.      4. Cigarette Smoking:  Encouraged complete cessation and discussed the health benefits.      5.  Right shoulder pain: s/p MRI and eval by orthopedics.  Advised to go to PT and she plans to when she feels comfortable with regard to COVID19 risk.    6.  Osteoporosis screening: Risk factors include postmenopausal status, rheumatoid arthritis, cigarette smoking.  Check DEXA  - DEXA ordered; patient was advised to call to schedule again    7. Hepatitis B core ab positive: PCR negative.      # Relevant labs and imaging were reviewed with the patient    # High risk medication toxicity monitoring: discussion and labs reviewed; appropriate labs ordered. See above.  Instructed that if confirmed to have COVID-19 or exposure to someone with confirmed COVID-19 to call this clinic for directions on DMARD management.    # Note that this is a virtual visit to reduce the risk of COVID-19 exposure during this current pandemic.      # Considered to be at high risk of complications from the COVID-19 virus.  It is recommended to limit contact with other people and if possible to work remotely or provide a leave of absence to reduce the risk for COVID-19.      Ms. Bagley verbalized agreement with and understanding of the rational for the diagnosis and treatment plan.  All questions were answered to best of my ability and the patient's satisfaction. Ms. Bagley was advised to contact the clinic with any questions that may arise after the clinic visit.      Thank you for involving me in the care of the patient    Return to clinic: 3 months      HPI   Skylar Bagley is a 60 year old female with a past medical history  significant for diabetes, GERD, hypothyroidism, hyperlipidemia, anxiety, depression, and RA who is seen for follow-up of rheumatoid arthritis.    Evaluated in clinic on 8/19/2019    No-show to scheduled appointment on 11/26/2019    No-show to scheduled appointment on 2/3/2020    Patient canceled appointment on 3/3/2020    3/9/2020: Ms. Bagley reported that Humira caused a lot of infections - sinus, lung, eye, etc she says.  Has been tx'd with abx once she says.  Humira was last taken about 1 week ago but has missed many doses because of infections.  Less infections when on Enbrel and she says that she prefers Enbrel.  Arms, feet, hands, ankles, knees; worse all day long she says and doesn't do activity she says. Stopped Leflunomide because her hair was falling out; says that she took vitamins and the hair thinning resolved     6/22/2020: right shoulder pain again, worse with any over the head activity and worse when she lays on it; 24/7 pain she says.  Note that she had polyarticular joint pain arthritis flare in March that resolved with a prednisone taper.  She had another flare in April but this time only include her right shoulder; this resolved with prednisone taper.  Now with right shoulder pain again.  She reports that all of her other joints are doing great on Orencia.  Morning stiffness <10 min. No gelling.  Says that she forgot to get labs last week; she says that she had a conflict and will make an appointment for this week.      Today, 9/28/2020: feels likes she is on the right course.  RA well controlled with Orencia.  DIPs and PIPs achy with activity, better with rest.  MCPs without swelling.  Morning stiffness <10 min.     Denies fevers, chills, nausea, or vomiting.  Chronic constipation.  No diarrhea.   No abdominal pain. No chest pain/pressure, palpitations, or shortness of breath. No LE swelling. No neck pain. No oral or nasal sores.  No rash. No sicca symptoms.        Tobacco: Cigarette  smoking  EtOH: None  Drugs: None  Occupation:  Reportedly on disability because of rheumatoid arthritis    ROS   GEN: No fevers, chills, night sweats, or weight change  SKIN: No itching, rashes, sores  HEENT: No oral or nasal ulcers.  CV: No chest pain, pressure, palpitations, or dyspnea on exertion.  PULM: No SOB, wheeze, cough.  GI: See HPI.  No blood in stool  : No blood in urine.  MSK: See HPI.  NEURO: No numbness or tingling  EXT: No LE swelling  PSYCH: See HPI    Active Problem List     Patient Active Problem List   Diagnosis     Type 2 diabetes mellitus without complication, without long-term current use of insulin (H)     Dyslipidemia     Class 1 obesity with serious comorbidity and body mass index (BMI) of 31.0 to 31.9 in adult, unspecified obesity type     Hypothyroidism, unspecified type     Gastroesophageal reflux disease, esophagitis presence not specified     Rheumatoid arthritis, involving unspecified site, unspecified rheumatoid factor presence (H)     Rheumatoid arthritis involving multiple sites with positive rheumatoid factor (H)     Past Medical History     Past Medical History:   Diagnosis Date     Arthritis      Depressive disorder      Diabetes (H)      Thyroid disease      Past Surgical History     Past Surgical History:   Procedure Laterality Date     VAG DELIV ONLY,PREV C-SECTN       Allergy     Allergies   Allergen Reactions     Asa [Aspirin]      Methotrexate Other (See Comments)     Current Medication List     Current Outpatient Medications   Medication Sig     Abatacept (ORENCIA CLICKJECT) 125 MG/ML SOAJ auto-injector Inject 1 mL (125 mg) Subcutaneous every 7 days ; refills at clinic visits only.     ACE/ARB/ARNI NOT PRESCRIBED, INTENTIONAL, Please choose reason not prescribed, below     atorvastatin (LIPITOR) 40 MG tablet Take 1 tablet (40 mg) by mouth daily     azithromycin (ZITHROMAX) 250 MG tablet Two tablets first day, then one tablet daily for four days.     blood glucose  monitoring (CONTOUR NEXT TEST) test strip TEST 4 TIMES A DAY     calcium carbonate 500 mg-vitamin D 200 units (OSCAL WITH D;OYSTER SHELL CALCIUM) 500-200 MG-UNIT per tablet Take 1 tablet by mouth     clonazePAM (KLONOPIN) 1 MG tablet Take 1 tablet (1 mg) by mouth 2 times daily as needed for anxiety     FLUoxetine (PROZAC) 40 MG capsule TAKE 1 CAPSULE (40 MG) BY MOUTH DAILY     fluticasone (FLONASE) 50 MCG/ACT nasal spray USE 1 SPRAY IN EACH NOSTRIL ONE TIME DAILY     gabapentin (NEURONTIN) 400 MG capsule Take 1 capsule (400 mg) by mouth 3 times daily     ibuprofen (IBU) 800 MG tablet TAKE 1 TABLET TWICE DAILY AS NEEDED FOR  MODERATE  PAIN     Lancets 30G MISC Test 4 times per day. ICD-10 code E11.9     levothyroxine (SYNTHROID/LEVOTHROID) 100 MCG tablet TAKE 1 TABLET EVERY DAY     loratadine-pseudoePHEDrine (CLARITIN-D 12-HOUR) 5-120 MG 12 hr tablet Take 1 tablet by mouth 2 times daily     metFORMIN (GLUCOPHAGE) 850 MG tablet TAKE 1 TABLET TWICE DAILY WITH MEALS     predniSONE (DELTASONE) 5 MG tablet Prednisone 20mg daily x5days, then 15mg daily x5days, then 10mg daily x5days, then 5mg daily x5days, then stop.     traZODone (DESYREL) 100 MG tablet TAKE 2 TABLETS (200 MG) BY MOUTH AT NIGHT AS NEEDED FOR SLEEP   (SUBSTITUTED FOR DESYREL) (Patient taking differently: At Bedtime )     ASPIRIN NOT PRESCRIBED (INTENTIONAL) Please choose reason not prescribed, below     predniSONE (DELTASONE) 5 MG tablet Prednisone 20mg daily x5days, then 15mg daily x5days, then 10mg daily x5days, then 5mg daily x5days, then stop. (Patient not taking: Reported on 7/27/2020)     risperiDONE (RISPERDAL) 3 MG tablet Take 1 tablet (3 mg) by mouth 2 times daily (Patient not taking: Reported on 7/27/2020)     Current Facility-Administered Medications   Medication     bupivacaine (MARCAINE) 0.25 % injection 4 mL     triamcinolone (KENALOG-40) injection 80 mg         Social History   See HPI    Family History     Family History   Problem Relation  "Age of Onset     Glaucoma Maternal Grandfather      Macular Degeneration No family hx of          Physical Exam     Temp Readings from Last 3 Encounters:   07/01/20 98.3  F (36.8  C) (Tympanic)   03/06/20 98.3  F (36.8  C) (Oral)   02/05/20 98.1  F (36.7  C) (Oral)     BP Readings from Last 5 Encounters:   07/27/20 116/79   07/01/20 120/80   03/09/20 109/69   03/06/20 105/67   02/05/20 122/80     Pulse Readings from Last 1 Encounters:   07/27/20 85     Resp Readings from Last 1 Encounters:   01/23/20 18     Estimated body mass index is 31.97 kg/m  as calculated from the following:    Height as of 7/27/20: 1.562 m (5' 1.5\").    Weight as of 7/27/20: 78 kg (172 lb).    Telephone Visit     Labs / Imaging (select studies)     RF/CCP  Recent Labs   Lab Test 04/22/19  1436   CCPIGG >340*   *     CBC  Recent Labs   Lab Test 06/25/20  1321 03/09/20  1324 10/21/19  0922   WBC 7.3 13.6* 6.4   RBC 4.34 3.72* 3.80   HGB 13.3 10.9* 11.8   HCT 39.7 34.2* 35.6   MCV 92 92 94   RDW 13.5 13.5 12.7    342 202   MCH 30.6 29.3 31.1   MCHC 33.5 31.9 33.1   NEUTROPHIL 53.2 73.1 59.6   LYMPH 38.3 16.0 28.1   MONOCYTE 6.6 10.1 9.5   EOSINOPHIL 1.8 0.6 2.5   BASOPHIL 0.1 0.2 0.3   ANEU 3.9 10.0* 3.8   ALYM 2.8 2.2 1.8   FARHAD 0.5 1.4* 0.6   AEOS 0.1 0.1 0.2   ABAS 0.0 0.0 0.0     CMP  Recent Labs   Lab Test 06/25/20  1321 03/09/20  1324 01/23/20  1432 10/21/19  0922  10/15/18  1407   NA  --   --  136  --   --  141   POTASSIUM  --   --  3.9  --   --  4.0   CHLORIDE  --   --  107  --   --  111*   CO2  --   --  21  --   --  22   ANIONGAP  --   --  8  --   --  8   GLC  --   --  93  --   --  71   BUN  --   --  13  --   --  15   CR 1.01 1.05* 0.96 0.85   < > 0.96   GFRESTIMATED 60* 58* 64 75   < > 59*   GFRESTBLACK 70 67 74 86   < > 72   MELANIE  --   --  9.3  --   --  9.2   BILITOTAL 0.2 0.2  --  0.2   < > 0.2   ALBUMIN 4.2 2.8*  --  4.0   < > 4.3   PROTTOTAL 7.7 8.1  --  7.6   < > 7.8   ALKPHOS 76 99  --  74   < > 58   AST 22 25  --  " 19   < > 21   ALT 48 27  --  37   < > 35    < > = values in this interval not displayed.     Calcium/VitaminD  Recent Labs   Lab Test 01/23/20  1432 10/15/18  1407   MELANIE 9.3 9.2     ESR/CRP  Recent Labs   Lab Test 06/25/20  1321 03/09/20  1324 08/19/19  1619   SED 8 102* 9   CRP <2.9 167.0* <2.9     Lipid Panel  Recent Labs   Lab Test 03/06/20  1416 10/15/18  1407 04/20/18   CHOL 124 130 228*   TRIG 125 75 245*   HDL 29* 55 61   LDL 70 60 118   NHDL 95 75 167*     Hepatitis B  Recent Labs   Lab Test 05/15/19  1409 04/22/19  1436   AUSAB  --  49.89*   HBCAB  --  Reactive*   HEPBANG  --  Nonreactive   HBQLOG Not Calculated  --      Hepatitis C  Recent Labs   Lab Test 04/22/19  1436   HCVAB Nonreactive     Lyme ab screening  Recent Labs   Lab Test 04/22/19  1436   LYMEGM 0.09     Tuberculosis Screening  Recent Labs   Lab Test 04/22/19  1436   TBRES Negative     HIV Screening  Recent Labs   Lab Test 01/04/19  1416   HIAGAB Nonreactive       Immunization History     Immunization History   Administered Date(s) Administered     FLU 6-35 months 01/27/2010     Influenza (High Dose) 3 valent vaccine 10/07/2016     Influenza (IIV3) PF 10/29/2010, 11/17/2011     Influenza Quad, Recombinant, p-free (RIV4) 10/15/2018, 02/05/2020     Influenza Vaccine IM > 6 months Valent IIV4 01/28/2016, 10/07/2016, 10/18/2017     Influenza Vaccine IM Ages 6-35 Months 4 Valent (PF) 10/11/2013     Pneumo Conj 13-V (2010&after) 10/15/2018     Pneumococcal 23 valent 04/19/2016     TD (ADULT, 7+) 09/13/2016     Twinrix A/B 01/27/2010     Zoster vaccine recombinant adjuvanted (SHINGRIX) 02/05/2020, 07/15/2020          Chart documentation done in part with Dragon Voice recognition Software. Although reviewed after completion, some word and grammatical error may remain.    Phone call start time: 2:58 PM  Phone call end time: 3:07 PM    This visit is equivalent to a 84920 visit    Location of patient: home  Location of provider: home    Follow up:   follow up appointment scheduled to be in Shantanu Wright MD

## 2020-09-29 ENCOUNTER — ALLIED HEALTH/NURSE VISIT (OUTPATIENT)
Dept: NURSING | Facility: CLINIC | Age: 60
End: 2020-09-29
Payer: COMMERCIAL

## 2020-09-29 DIAGNOSIS — Z23 NEED FOR PROPHYLACTIC VACCINATION AND INOCULATION AGAINST INFLUENZA: Primary | ICD-10-CM

## 2020-09-29 PROCEDURE — 90471 IMMUNIZATION ADMIN: CPT

## 2020-09-29 PROCEDURE — 90682 RIV4 VACC RECOMBINANT DNA IM: CPT

## 2020-10-07 ENCOUNTER — TRANSFERRED RECORDS (OUTPATIENT)
Dept: HEALTH INFORMATION MANAGEMENT | Facility: CLINIC | Age: 60
End: 2020-10-07

## 2020-10-09 DIAGNOSIS — M05.79 RHEUMATOID ARTHRITIS INVOLVING MULTIPLE SITES WITH POSITIVE RHEUMATOID FACTOR (H): Primary | ICD-10-CM

## 2020-10-09 RX ORDER — IBUPROFEN 800 MG/1
TABLET, FILM COATED ORAL
Qty: 180 TABLET | Refills: 0 | Status: SHIPPED | OUTPATIENT
Start: 2020-10-09 | End: 2021-01-26

## 2020-10-09 NOTE — TELEPHONE ENCOUNTER
Next 5 appointments (look out 90 days)    Oct 29, 2020  9:40 AM  Office Visit with Toñito Cortez MD  [facility] ([facility]) [address] [address] [address] [phone]        Rx refilled.    Adriana Thornton BSN, RN

## 2020-10-29 ENCOUNTER — OFFICE VISIT (OUTPATIENT)
Dept: FAMILY MEDICINE | Facility: CLINIC | Age: 60
End: 2020-10-29
Payer: COMMERCIAL

## 2020-10-29 VITALS
OXYGEN SATURATION: 95 % | SYSTOLIC BLOOD PRESSURE: 121 MMHG | BODY MASS INDEX: 31.1 KG/M2 | HEART RATE: 97 BPM | DIASTOLIC BLOOD PRESSURE: 84 MMHG | HEIGHT: 62 IN | TEMPERATURE: 98.6 F | WEIGHT: 169 LBS

## 2020-10-29 DIAGNOSIS — E11.9 TYPE 2 DIABETES MELLITUS WITHOUT COMPLICATION, WITHOUT LONG-TERM CURRENT USE OF INSULIN (H): Primary | ICD-10-CM

## 2020-10-29 DIAGNOSIS — Z91.09 ENVIRONMENTAL ALLERGIES: ICD-10-CM

## 2020-10-29 PROCEDURE — 99214 OFFICE O/P EST MOD 30 MIN: CPT | Performed by: FAMILY MEDICINE

## 2020-10-29 RX ORDER — METFORMIN HCL 500 MG
1000 TABLET, EXTENDED RELEASE 24 HR ORAL 2 TIMES DAILY WITH MEALS
Qty: 360 TABLET | Refills: 1 | Status: SHIPPED | OUTPATIENT
Start: 2020-10-29

## 2020-10-29 RX ORDER — MONTELUKAST SODIUM 10 MG/1
10 TABLET ORAL AT BEDTIME
Qty: 90 TABLET | Refills: 3 | Status: SHIPPED | OUTPATIENT
Start: 2020-10-29

## 2020-10-29 RX ORDER — BLOOD-GLUCOSE METER
EACH MISCELLANEOUS
Qty: 100 EACH | Refills: 3 | Status: SHIPPED | OUTPATIENT
Start: 2020-10-29

## 2020-10-29 RX ORDER — AMANTADINE HYDROCHLORIDE 100 MG/1
CAPSULE, GELATIN COATED ORAL
COMMUNITY
Start: 2020-10-19

## 2020-10-29 ASSESSMENT — MIFFLIN-ST. JEOR: SCORE: 1281.89

## 2020-10-29 NOTE — PATIENT INSTRUCTIONS
1. Let's increase the Metformin to 1000 mg twice per day.    2. See you on 12/24/20 at 10:40 in the morning - come fasting so we can do labs.

## 2020-10-30 ENCOUNTER — TELEPHONE (OUTPATIENT)
Dept: FAMILY MEDICINE | Facility: CLINIC | Age: 60
End: 2020-10-30

## 2020-10-30 DIAGNOSIS — E11.9 TYPE 2 DIABETES MELLITUS WITHOUT COMPLICATION, WITHOUT LONG-TERM CURRENT USE OF INSULIN (H): Primary | ICD-10-CM

## 2020-10-30 NOTE — TELEPHONE ENCOUNTER
Message: Pt has [employer] insurance. Preferred product is Accu-Chek. Please send a new rx for Guide Meter.

## 2020-12-01 ENCOUNTER — TRANSFERRED RECORDS (OUTPATIENT)
Dept: HEALTH INFORMATION MANAGEMENT | Facility: CLINIC | Age: 60
End: 2020-12-01

## 2020-12-04 ENCOUNTER — ANCILLARY PROCEDURE (OUTPATIENT)
Dept: CT IMAGING | Facility: CLINIC | Age: 60
End: 2020-12-04
Attending: INTERNAL MEDICINE
Payer: COMMERCIAL

## 2020-12-04 DIAGNOSIS — R91.8 PULMONARY NODULES: ICD-10-CM

## 2020-12-04 PROCEDURE — 71250 CT THORAX DX C-: CPT | Mod: GC | Performed by: RADIOLOGY

## 2020-12-07 ENCOUNTER — VIRTUAL VISIT (OUTPATIENT)
Dept: PULMONOLOGY | Facility: CLINIC | Age: 60
End: 2020-12-07
Payer: COMMERCIAL

## 2020-12-07 DIAGNOSIS — R91.8 PULMONARY NODULES: Primary | ICD-10-CM

## 2020-12-07 PROCEDURE — 99214 OFFICE O/P EST MOD 30 MIN: CPT | Mod: 95 | Performed by: INTERNAL MEDICINE

## 2020-12-07 NOTE — PROGRESS NOTES
"Skylar Bagley is a 60 year old female who is being evaluated via a billable telephone visit.      The patient has been notified of following:     \"This telephone visit will be conducted via a call between you and your physician/provider. We have found that certain health care needs can be provided without the need for a physical exam.  This service lets us provide the care you need with a short phone conversation.  If a prescription is necessary we can send it directly to your pharmacy.  If lab work is needed we can place an order for that and you can then stop by our lab to have the test done at a later time.    Telephone visits are billed at different rates depending on your insurance coverage. During this emergency period, for some insurers they may be billed the same as an in-person visit.  Please reach out to your insurance provider with any questions.    If during the course of the call the physician/provider feels a telephone visit is not appropriate, you will not be charged for this service.\"    Patient has given verbal consent for Telephone visit?  Yes  What phone number would you like to be contacted at? [phone] (P)  How would you like to obtain your AVS? Mail      Paulo Regan LPN    Rheumatology / Pulmonology  [facility]            Phone call duration: 25 minutes    Mahin Chavis MD    "

## 2020-12-07 NOTE — PROGRESS NOTES
"LUNG NODULE & INTERVENTIONAL PULMONARY CLINIC  [facility], [facility], [facility]   VIRTUAL TELEPHONE VISIT    Skylar Bagley MRN# [id number]   Age: 60 year old YOB: 1960     Reason for Consultation: lung nodule(s)    Requesting Physician: No referring provider defined for this encounter.    Skylar Bagley is a 60 year old female who is being evaluated via a billable telephone visit.      The patient has been notified of following: \"This telephone visit will be conducted via a call between you and your physician/provider. We have found that certain health care needs can be provided without the need for a physical exam.  This service lets us provide the care you need with a short phone conversation.  If a prescription is necessary we can send it directly to your pharmacy.  If lab work is needed we can place an order for that and you can then stop by our lab to have the test done at a later time. Telephone visits are billed at different rates depending on your insurance coverage. During this emergency period, for some insurers they may be billed the same as an in-person visit.  Please reach out to your insurance provider with any questions. If during the course of the call the physician/provider feels a telephone visit is not appropriate, you will not be charged for this service.\"    Patient has given verbal consent for Telephone visit?  Yes    How would you like to obtain your AVS? Sherriet    Phone call duration: 25 minutes    Additional provider notes: see below     Assessment and Plan:    1. Bilateral LL nodular infiltrate. Resolved.       2. Lung cancer screening. Annual LDCT        Billing: The patient was seen and examined by me and the findings, assessment, and plan as documented was explained to the patient/family who expressed understand.      Mahin Chavis MD   of Medicine  Interventional Pulmonology  Department of " Pulmonary, Allergy, Critical Care and Sleep Medicine   [facility]  Pager: [phone]           History:      Skylar Bagley is a 60 year old female with sig h/o for MDD, DM2, hypothyroidism, RA, hyperlipidemia who is here for evaluation/followup of lung nodule(s).     - No new resp sx or complaints. Denies dyspnea or cough.   - f/u nodules.   - Personal hx of cancer: No. Up-to-date on mammo and c-scope   - Family hx of cancer: No  - Exposure hx: Denies asbestos or radon exposure   - Tobacco hx: Current Smoker: 1ppd since 18yo. Longest she quit was 6yrs. She quit smoking 3mo ago due to abnormal imaging.   - My interpretation of the images relevant for this visit includes: nodular   - My interpretation of the PFT's relevant for this visit includes: None      Culprit Nodule(s):   1. Bilateral LL lung nodules. Markedly improved since March.      Other active medical problems include:   - Had MDD. stable   - Has RA. Recent flair and started on prednisone burst. Maintained on orencia    - Has hypothyroidism. Stable.           Past Medical History:      Past Medical History:   Diagnosis Date     Arthritis      Depressive disorder      Diabetes (H)      Thyroid disease            Past Surgical History:      Past Surgical History:   Procedure Laterality Date     VAG DELIV ONLY,PREV C-SECTN            Social History:     Social History     Tobacco Use     Smoking status: Current Every Day Smoker     Packs/day: 0.50     Years: 20.00     Pack years: 10.00     Types: Cigarettes     Smokeless tobacco: Never Used   Substance Use Topics     Alcohol use: No          Family History:     Family History   Problem Relation Age of Onset     Glaucoma Maternal Grandfather      Macular Degeneration No family hx of            Allergies:      Allergies   Allergen Reactions     Asa [Aspirin]      Methotrexate Other (See Comments)          Medications:     Current Outpatient Medications   Medication Sig     Abatacept  (ORENCIA CLICKJECT) 125 MG/ML SOAJ auto-injector Inject 1 mL (125 mg) Subcutaneous every 7 days ; refills at clinic visits only.     amantadine (SYMMETREL) 100 MG capsule      atorvastatin (LIPITOR) 40 MG tablet Take 1 tablet (40 mg) by mouth daily     blood glucose ([employer] FINEPOINT) lancets Check glucose once per day.     blood glucose (NO BRAND SPECIFIED) test strip Check glucose once per day.     blood glucose monitoring (CONTOUR NEXT TEST) test strip TEST 4 TIMES A DAY     blood glucose monitoring (NO BRAND SPECIFIED) meter device kit Use to test blood sugar 1 times daily or as directed.     calcium carbonate 500 mg-vitamin D 200 units (OSCAL WITH D;OYSTER SHELL CALCIUM) 500-200 MG-UNIT per tablet Take 1 tablet by mouth     clonazePAM (KLONOPIN) 1 MG tablet Take 1 tablet (1 mg) by mouth 2 times daily as needed for anxiety     FLUoxetine (PROZAC) 40 MG capsule TAKE 1 CAPSULE (40 MG) BY MOUTH DAILY     fluticasone (FLONASE) 50 MCG/ACT nasal spray USE 1 SPRAY IN EACH NOSTRIL ONE TIME DAILY     gabapentin (NEURONTIN) 400 MG capsule Take 1 capsule (400 mg) by mouth 3 times daily     ibuprofen (IBU) 800 MG tablet TAKE 1 TABLET TWICE DAILY AS NEEDED FOR  MODERATE  PAIN     Lancets 30G MISC Test 4 times per day. ICD-10 code E11.9     levothyroxine (SYNTHROID/LEVOTHROID) 100 MCG tablet TAKE 1 TABLET EVERY DAY     metFORMIN (GLUCOPHAGE-XR) 500 MG 24 hr tablet Take 2 tablets (1,000 mg) by mouth 2 times daily (with meals) (Patient taking differently: Take 2,000 mg by mouth 2 times daily (with meals) )     montelukast (SINGULAIR) 10 MG tablet Take 1 tablet (10 mg) by mouth At Bedtime     traZODone (DESYREL) 100 MG tablet TAKE 2 TABLETS (200 MG) BY MOUTH AT NIGHT AS NEEDED FOR SLEEP   (SUBSTITUTED FOR DESYREL) (Patient taking differently: At Bedtime )     ACE/ARB/ARNI NOT PRESCRIBED, INTENTIONAL, Please choose reason not prescribed, below (Patient not taking: Reported on 12/7/2020)     ASPIRIN NOT PRESCRIBED (INTENTIONAL)  Please choose reason not prescribed, below (Patient not taking: Reported on 12/7/2020)     azithromycin (ZITHROMAX) 250 MG tablet Two tablets first day, then one tablet daily for four days. (Patient not taking: Reported on 12/7/2020)     risperiDONE (RISPERDAL) 3 MG tablet Take 1 tablet (3 mg) by mouth 2 times daily (Patient not taking: Reported on 7/27/2020)     Current Facility-Administered Medications   Medication     bupivacaine (MARCAINE) 0.25 % injection 4 mL     triamcinolone (KENALOG-40) injection 80 mg          Review of Systems:     CONSTITUTIONAL: negative for fever, chills, change in weight  INTEGUMENTARY/SKIN: no rash or obvious new lesions  ENT/MOUTH: no sore throat, new sinus pain or nasal drainage  RESP: see interval history  CV: negative for chest pain, palpitations or peripheral edema  GI: no nausea, vomiting, change in stools  : no dysuria  MUSCULOSKELETAL: no myalgias, arthralgias  ENDOCRINE: blood sugars with adequate control  PSYCHIATRIC: mood stable  LYMPHATIC: no new lymphadenopathy  HEME: no bleeding or easy bruisability  NEURO: no numbness, weakness, headaches         Physical Exam:      A comprehensive physical examination is deferred due to PHE (public health emergency) telephone visit restrictions.         Current Laboratory Data:   All laboratory and imaging data reviewed.    No results found for this or any previous visit (from the past 24 hour(s)).         Previous Pulmonary Function Testing   No results found for: 20001  No results found for: 20002  No results found for: 20003  No results found for: 20015  No results found for: 20016  No results found for: 20027  No results found for: 20028  No results found for: 20029  No results found for: 20079  No results found for: 20080  No results found for: 20081  No results found for: 20335  No results found for: 20105  No results found for: 20053  No results found for: 20054  No results found for: 20055         Previous Chest Imaging   No  images are attached to the encounter.  No images are attached to the encounter or orders placed in the encounter.         Previous Cardiology Imaging   No results found for this or any previous visit (from the past 8760 hour(s)).

## 2020-12-11 ENCOUNTER — TELEPHONE (OUTPATIENT)
Dept: RHEUMATOLOGY | Facility: CLINIC | Age: 60
End: 2020-12-11

## 2020-12-11 NOTE — TELEPHONE ENCOUNTER
Contacted Tee with [facility] who said that patient is due for a refill. Per last prescription from 9/28/20, patient should still have 1 refill. RN contacted [facility] and spoke with a pharmacist Gisell who reports that patient's last fill will be shipped out on 12/17/20. Based on this, patient should have enough medication to last her until her visit with Dr. Wright on 1/11/21. Medication will be refilled at follow up visit.    Jaron Lala RN....12/11/2020 11:25 AM

## 2020-12-11 NOTE — TELEPHONE ENCOUNTER
[facility] calling to request refill for Orencia. Please reach out to Tee with [facility] on his cell - [phone] ( he is hoping to speak with Jaron)

## 2020-12-22 DIAGNOSIS — M05.79 RHEUMATOID ARTHRITIS INVOLVING MULTIPLE SITES WITH POSITIVE RHEUMATOID FACTOR (H): ICD-10-CM

## 2020-12-22 NOTE — TELEPHONE ENCOUNTER
A 4 month supply of Orencia was sent on 9/28/20 so patient should not need a refill. RN called the patient and she said that after 12/31/20, she cannot see Dr. Wright anymore because he is out of network for [facility]. She has an apt on 1/11/21 that she will be unable to have. Per Dr. Wright, Orencia can only be refilled at clinic visits. Patient is requesting refills to last her until she finds a new rheumatologist. RN forwarded message to Dr. Wright for review.     Jaron Lala RN....12/22/2020 2:09 PM

## 2020-12-28 RX ORDER — ABATACEPT 125 MG/ML
125 INJECTION, SOLUTION SUBCUTANEOUS
Qty: 4 ML | Refills: 2 | Status: SHIPPED | OUTPATIENT
Start: 2020-12-28

## 2020-12-28 NOTE — TELEPHONE ENCOUNTER
Rheumatology team: Please call to notify Ms. Bagley that a 3 month supply of Orencia has been refilled.   Quinten Wright MD  12/28/2020 8:51 AM

## 2021-01-04 DIAGNOSIS — M05.79 RHEUMATOID ARTHRITIS INVOLVING MULTIPLE SITES WITH POSITIVE RHEUMATOID FACTOR (H): ICD-10-CM

## 2021-01-04 RX ORDER — ABATACEPT 125 MG/ML
125 INJECTION, SOLUTION SUBCUTANEOUS
Qty: 4 ML | Refills: 2 | Status: CANCELLED | OUTPATIENT
Start: 2021-01-04

## 2021-01-04 NOTE — TELEPHONE ENCOUNTER
Prescription was sent and confirmed by pharmacy on 12/28/2020. Please cancel refill.  Sheila Hough CMA Rheumatology  1/4/2021 1:53 PM    
Negative

## 2021-01-20 DIAGNOSIS — M05.79 RHEUMATOID ARTHRITIS INVOLVING MULTIPLE SITES WITH POSITIVE RHEUMATOID FACTOR (H): ICD-10-CM

## 2021-01-26 RX ORDER — IBUPROFEN 800 MG/1
TABLET, FILM COATED ORAL
Qty: 90 TABLET | Refills: 0 | Status: SHIPPED | OUTPATIENT
Start: 2021-01-26

## 2021-02-19 DIAGNOSIS — G62.9 PERIPHERAL POLYNEUROPATHY: ICD-10-CM

## 2021-02-19 RX ORDER — GABAPENTIN 400 MG/1
CAPSULE ORAL
Qty: 270 CAPSULE | Refills: 2 | OUTPATIENT
Start: 2021-02-19

## 2021-02-19 NOTE — TELEPHONE ENCOUNTER
Routing refill request to provider for review/approval because:  Drug not on the [facility] refill protocol     Inna NAIDUN, RN, CPN

## 2021-02-19 NOTE — TELEPHONE ENCOUNTER
Scheduled but only wanted phone visit. Patient is currently sick with covid.    -Niurka PONCE, PSC
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
## 2021-02-22 ENCOUNTER — TELEPHONE (OUTPATIENT)
Dept: RHEUMATOLOGY | Facility: CLINIC | Age: 61
End: 2021-02-22

## 2021-02-23 ENCOUNTER — VIRTUAL VISIT (OUTPATIENT)
Dept: FAMILY MEDICINE | Facility: CLINIC | Age: 61
End: 2021-02-23
Payer: COMMERCIAL

## 2021-02-23 DIAGNOSIS — E11.9 TYPE 2 DIABETES MELLITUS WITHOUT COMPLICATION, WITHOUT LONG-TERM CURRENT USE OF INSULIN (H): ICD-10-CM

## 2021-02-23 DIAGNOSIS — G62.9 PERIPHERAL POLYNEUROPATHY: Primary | ICD-10-CM

## 2021-02-23 DIAGNOSIS — J32.9 SINUSITIS, UNSPECIFIED CHRONICITY, UNSPECIFIED LOCATION: ICD-10-CM

## 2021-02-23 PROCEDURE — 99213 OFFICE O/P EST LOW 20 MIN: CPT | Mod: 95 | Performed by: PHYSICIAN ASSISTANT

## 2021-02-23 RX ORDER — FLUTICASONE PROPIONATE 50 MCG
SPRAY, SUSPENSION (ML) NASAL
Qty: 32 G | Refills: 11 | Status: SHIPPED | OUTPATIENT
Start: 2021-02-23

## 2021-02-23 RX ORDER — GABAPENTIN 400 MG/1
400 CAPSULE ORAL 3 TIMES DAILY
Qty: 270 CAPSULE | Refills: 0 | Status: SHIPPED | OUTPATIENT
Start: 2021-02-23

## 2021-02-24 ENCOUNTER — TELEPHONE (OUTPATIENT)
Dept: FAMILY MEDICINE | Facility: CLINIC | Age: 61
End: 2021-02-24

## 2021-02-24 NOTE — TELEPHONE ENCOUNTER
Message: We received RXs for Gabapentin 400mg cap (dated 2/23/21 by Miller) and Gabapentin 100mg cap (dated 2/22/21 by Renée). Please clarify current therapy.

## 2021-02-25 NOTE — TELEPHONE ENCOUNTER
Ask patient, during our visit she asked for the 400 mg dose which is what was in her chart.   The other one rx by a different provider so I am not understanding what patient is wanting.     Rhea Bhatt PA-C

## 2021-02-25 NOTE — TELEPHONE ENCOUNTER
I called pt and she states she is currently taking gabapentin 400 mg tid.  She does not know why Dr. Sapp sent the other dose.    Can I tell pharmacy to fill the 400 mg prescription and discontinue the 100 mg dose?  July NAIDUN, RN

## 2021-02-26 NOTE — TELEPHONE ENCOUNTER
Pharmacy, [facility] is given information,   Spoke to pharmacist Kavin CORTEZ   Verbalized good understanding.   Kelsey White RN

## 2021-04-01 ENCOUNTER — MEDICAL CORRESPONDENCE (OUTPATIENT)
Dept: HEALTH INFORMATION MANAGEMENT | Facility: CLINIC | Age: 61
End: 2021-04-01

## 2021-04-01 DIAGNOSIS — Z79.899 ENCOUNTER FOR LONG-TERM (CURRENT) USE OF MEDICATIONS: ICD-10-CM

## 2021-04-01 DIAGNOSIS — M79.7 PRIMARY FIBROSITIS: ICD-10-CM

## 2021-04-01 DIAGNOSIS — M06.09 RHEUMATOID ARTHRITIS OF MULTIPLE SITES WITHOUT RHEUMATOID FACTOR (H): Primary | ICD-10-CM

## 2021-04-01 DIAGNOSIS — G89.29 OTHER CHRONIC PAIN: ICD-10-CM

## 2021-04-13 DIAGNOSIS — G62.9 PERIPHERAL POLYNEUROPATHY: ICD-10-CM

## 2021-04-13 RX ORDER — GABAPENTIN 400 MG/1
CAPSULE ORAL
Qty: 270 CAPSULE | Refills: 0 | OUTPATIENT
Start: 2021-04-13

## 2021-04-13 NOTE — TELEPHONE ENCOUNTER
Per my last phone call with patient [facility] Please confirm/specify. Have them ask their current doctor if needed. I can send over one month to bridge any gaps if needed. Rhea Bhatt PA-C

## 2021-04-13 NOTE — TELEPHONE ENCOUNTER
Routing refill request to provider for review/approval because:  Drug not on the [facility] refill protocol     Adriana NAIDUN, RN

## 2021-04-15 NOTE — TELEPHONE ENCOUNTER
Called the patient and received clarification that she has established care with another provider since January and does not need a refill.  ASHUTOSH Smith

## 2021-06-10 ENCOUNTER — TRANSFERRED RECORDS (OUTPATIENT)
Dept: HEALTH INFORMATION MANAGEMENT | Facility: CLINIC | Age: 61
End: 2021-06-10

## 2021-10-15 ENCOUNTER — TRANSFERRED RECORDS (OUTPATIENT)
Dept: HEALTH INFORMATION MANAGEMENT | Facility: CLINIC | Age: 61
End: 2021-10-15
Payer: COMMERCIAL

## 2021-10-15 LAB
PHQ9 SCORE: 14
PHQ9 SCORE: 5

## 2021-10-19 ENCOUNTER — TRANSFERRED RECORDS (OUTPATIENT)
Dept: HEALTH INFORMATION MANAGEMENT | Facility: CLINIC | Age: 61
End: 2021-10-19
Payer: COMMERCIAL

## 2021-11-09 ENCOUNTER — TRANSFERRED RECORDS (OUTPATIENT)
Dept: HEALTH INFORMATION MANAGEMENT | Facility: CLINIC | Age: 61
End: 2021-11-09
Payer: COMMERCIAL

## 2021-11-09 LAB — PHQ9 SCORE: 1

## 2022-02-17 PROBLEM — M06.9 RHEUMATOID ARTHRITIS (H): Status: ACTIVE | Noted: 2018-11-23

## 2022-02-17 PROBLEM — K21.9 GASTROESOPHAGEAL REFLUX DISEASE: Status: ACTIVE | Noted: 2018-11-23

## 2022-05-25 NOTE — TELEPHONE ENCOUNTER
Patient called and she currently is seeing a provider at [facility] and is wanting to switch. I gave her local names and phone number to other places. I also told her to call her insurance company to see where she would be covered to go.Yamileth Cueva MA/ASHUTOSH     Opioid Pregnancy And Lactation Text: These medications can lead to premature delivery and should be avoided during pregnancy. These medications are also present in breast milk in small amounts.

## 2022-06-16 NOTE — PROGRESS NOTES
"Subjective     Skylar Bagley is a 60 year old female who presents to clinic today for the following health issues:    Non compliance has been an issue with clinic appointments etc -   I continue to try to motivate her.    Poor balance, recurrent falls, psychomotor retardation - around 6/7/20 she hit her forehead on a cabinet. Since then she has had mild headaches. Also she says she fell 5 times. No major injuries. She says her balance is off \"as if I'm drunk.\" At baseline, slow moving, slow talking. But cognitively just fine.    Brain MRI for her previously negative.   I previously referred her to neurology - she is being treated for Parkinson's.    Abnormal CXR and CT - this was done due to cough.   Evaluation and treatment:   CXR today as below - treated with Zithromax.   Lung CT on 6/1/20 - resolving opacities suggesting infectious/inflammatory process.   She had virtual visit with lung nodule clinic - they felt this was related to her RA.    Results for orders placed or performed in visit on 03/06/20   XR Chest 2 Views     Status: None    Narrative    CHEST TWO VIEWS  3/6/2020 2:18 PM     HISTORY: 60-year-old woman with history of cough.    COMPARISON: None       Impression    IMPRESSION: Heart size is normal. Ovoid opacity in the right perihilar  location is identified on PA radiograph measuring up to 5.9 cm.  Suspect this is in the superior segment of right lower lobe on lateral  radiograph and may represent pneumonia. However, underlying mass could  not be excluded given ovoid shape. Therefore, recommend close  radiographic surveillance until resolution. Similarly, ovoid left  lower lobe retrocardiac well-defined opacity measures up to 7.4 x 4.1  cm. Also, this could represent pneumonia, though underlying mass is  possible. Both require close follow-up. Granuloma in the right lung is  incidentally noted.    IONA ROSENBAUM MD     Peripheral neuropathy - since around Oct 2018 she has had \"pins and needles\" " in both feet. Sometimes comes up the legs. No focal weakness.   Evaluation and treatment:   B12 level normal at 347 today 1/4/18.   I think this may be related to her diabetes.   Gabapentin 400 mg tid - helps.    Previous GERD - she used to get heart burn a lot - mostly epigastric pain without any chest or throat burning.   Evaluation and treatment:    When she stopped her Plaquenil, her pain resolved.     RA -   Evaluation and treatment:    Followed previously with rheumatologist. Macy Felipe, [facility].   She is now following with our rheumatologist, Dr. Wright.   He has ordered a shoulder MRI for her.    Type 2 diabetes - dx'd around around age 55. Checks glucose about 3 times per day. Results are around 136-196. Denies symptoms of high or low glucose.   Evaluation and treatment:    Eye exam - per patient fine on 10/16/18. Advised to set up another one.   Foot exam - fine 2/5/20.   Urine albumin - negative 1/23/20.   ASA - allergic - hives   Metformin 850 mg bid - no side effects - I asked her to increase to 1000 mg bid.   Glyburide stopped due to hypoglycemia.   Counseled about the various complications of diabetes, weight loss via diet, exercise, checking glucose levels, self foot check and managing hypoglycemia.    Lab Results   Component Value Date    A1C 6.5 07/01/2020    A1C 5.9 01/23/2020    A1C 5.8 05/15/2019    A1C 5.5 10/15/2018    A1C 5.6 04/20/2018     Last Comprehensive Metabolic Panel:  Sodium   Date Value Ref Range Status   01/23/2020 136 133 - 144 mmol/L Final     Potassium   Date Value Ref Range Status   01/23/2020 3.9 3.4 - 5.3 mmol/L Final     Chloride   Date Value Ref Range Status   01/23/2020 107 94 - 109 mmol/L Final     Carbon Dioxide   Date Value Ref Range Status   01/23/2020 21 20 - 32 mmol/L Final     Anion Gap   Date Value Ref Range Status   01/23/2020 8 3 - 14 mmol/L Final     Glucose   Date Value Ref Range Status   01/23/2020 93 70 - 99 mg/dL  Final     Comment:     Non Fasting     Urea Nitrogen   Date Value Ref Range Status   01/23/2020 13 7 - 30 mg/dL Final     Creatinine   Date Value Ref Range Status   06/25/2020 1.01 0.52 - 1.04 mg/dL Final     GFR Estimate   Date Value Ref Range Status   06/25/2020 60 (L) >60 mL/min/[1.73_m2] Final     Comment:     Non  GFR Calc  Starting 12/18/2018, serum creatinine based estimated GFR (eGFR) will be   calculated using the Chronic Kidney Disease Epidemiology Collaboration   (CKD-EPI) equation.       Calcium   Date Value Ref Range Status   01/23/2020 9.3 8.5 - 10.1 mg/dL Final     Overweight (previously Obesity) -   Evaluation and treatment:    Diet and exercise discussed.    Body mass index is 31.42 kg/m .  Wt Readings from Last 5 Encounters:   10/29/20 76.7 kg (169 lb)   07/27/20 78 kg (172 lb)   07/01/20 75.8 kg (167 lb)   03/09/20 69.9 kg (154 lb)   03/06/20 69.9 kg (154 lb)     Dyslipidemia - no h/o vascular disease like CAD, PAD, CVA but has h/o diabetes.   Evaluation and treatment:    Per ATP, moderate to high instensity statin recommended.   Counseling done today regarding healthy weight, diet and exercise.    Lipitor 40 mg every day - no side effects.   Continue same tx.     Recent Labs   Lab Test 03/06/20  1416 10/15/18  1407   CHOL 124 130   HDL 29* 55   LDL 70 60   TRIG 125 75     Lab Results   Component Value Date    ALT 37 10/21/2019     Hypothyroidism - denies thyroid type symptoms.  Evaluation and treatment:    Synthroid 100 mcg daily (reduced from 112 mcg) - no side effects.   We will continue with same dose for now.     TSH   Date Value Ref Range Status   01/23/2020 3.99 0.40 - 4.00 mU/L Final     Depression, anxiety, panic, psychosis -   Evaluation and treatment:    followed with psychiatry but now she is switching - upcoming appointment assessment 5/31/19.    Klonopin 1 mg tid - I refilled short term with warnings.   Trazodone 200 mg at bedtime - I refilled short term.   Prozac 40  mg daily - I refilled short term.   This needs to be managed by psychiatry.    Tobacco abuse - started age 18, average 1 ppd, quit total of 18 years, quit and restarted. Quit again Jan 2019. Restarted around Dec 2019. Quit again Sept 2020. This gives her about 24 pack year.   Evaluation and treatment:    Does not qualify for lung CA screen - however given abnormal CXR, I previously ordered chest CT - see above. Repeat March 2021.    Nasal congestion - this is chronic for her.  Evaluation and treatment:   Does not like Flonase.    Claritin doesn't work.   Try Singulair 10 mg daily.    Preventive -     Immunization History   Administered Date(s) Administered     FLU 6-35 months 01/27/2010     Influenza (High Dose) 3 valent vaccine 10/07/2016     Influenza (IIV3) PF 10/29/2010, 11/17/2011     Influenza Quad, Recombinant, p-free (RIV4) 10/15/2018, 02/05/2020, 09/29/2020     Influenza Vaccine IM > 6 months Valent IIV4 01/28/2016, 10/07/2016, 10/18/2017     Influenza Vaccine IM Ages 6-35 Months 4 Valent (PF) 10/11/2013     Pneumo Conj 13-V (2010&after) 10/15/2018     Pneumococcal 23 valent 04/19/2016     TD (ADULT, 7+) 09/13/2016     Twinrix A/B 01/27/2010     Zoster vaccine recombinant adjuvanted (SHINGRIX) 02/05/2020, 07/15/2020     -STD screen: negative 1/4/19.     -Mammogram: 3/6/20 - negative.    -PAP: repeat in 5 years.    Lab Results   Component Value Date    PAP NIL 03/06/2020     - Colon CA screen: declines Colonoscopy. Previously accepted FIT - negative 10/24/18. I previously ordered cologuard - but she says her dog ate it or something. I ordered another one for her previously multiple times - today she simply told me she does not want any further colon cancer screening.     - Hep C screen: negative 4/22/19    - Advanced Directive: referred previously and today - she has life alert    - Lung cancer screen: per HPI    SH:    Marital status: , has boyfriend.   Kids: 1 daughter  Employment:  "disabled  Exercise: walking 3 times per week  Tobacco: per HPI  Etoh: no  Recreational drugs: no  Caffeine: no      OBJECTIVE:   /84   Pulse 97   Temp 98.6  F (37  C) (Tympanic)   Ht 1.562 m (5' 1.5\")   Wt 76.7 kg (169 lb)   SpO2 95%   BMI 31.42 kg/m    EXAM:  GENERAL: healthy, alert and no distress  EYES: Eyes grossly normal to inspection, PERRL and conjunctivae and sclerae normal  HENT: ear canals and TM's normal, nose and mouth without ulcers or lesions  NECK: no adenopathy, no asymmetry, masses, or scars and thyroid normal to palpation  RESP: good air movement, otherwise clear. O2 sat noted. No tachypnea or other signs of respiratory distress.  CV: regular rate and rhythm, normal S1 S2, no S3 or S4, no murmur, click or rub, no peripheral edema and peripheral pulses strong  ABDOMEN: soft, nontender, no hepatosplenomegaly, no masses and bowel sounds normal  MS: no gross musculoskeletal defects noted, no edema  SKIN: no suspicious lesions or rashes  NEURO: Normal strength and tone, mentation intact and speech normal  PSYCH: psychomotor retardation at baseline.    Assessment and Plan - Decision Making    1. Type 2 diabetes mellitus without complication, without long-term current use of insulin (H)    Per HPI    - metFORMIN (GLUCOPHAGE-XR) 500 MG 24 hr tablet; Take 2 tablets (1,000 mg) by mouth 2 times daily (with meals)  Dispense: 360 tablet; Refill: 1  - blood glucose (NO BRAND SPECIFIED) test strip; Check glucose once per day.  Dispense: 100 each; Refill: 3  - blood glucose (LIFESCAN FINEPOINT) lancets; Check glucose once per day.  Dispense: 100 each; Refill: 3    2. Environmental allergies    Per HPI    - montelukast (SINGULAIR) 10 MG tablet; Take 1 tablet (10 mg) by mouth At Bedtime  Dispense: 90 tablet; Refill: 3      Written instructions given as follows:    Patient Instructions   1. Let's increase the Metformin to 1000 mg twice per day.    2. See you on 12/24/20 at 10:40 in the morning - come " fasting so we can do labs.           show

## 2022-10-05 NOTE — TELEPHONE ENCOUNTER
Patient informed Prescription has been sent to Pharmacy    Yasmin Hough MA     Previously Declined (within the last year)

## 2022-11-12 NOTE — PROGRESS NOTES
"CHIEF COMPLAINT:   Chief Complaint   Patient presents with     Right Shoulder - Pain     Onset: end of April 2020. NKI. Pain just came on and had kept getting worse and worse. She thought she was having a flare up from RA. She tried prednisone but as soon as she was done with it the pain would come back. Pain is in the      Shoulder Pain     upper arm. No tx.   .  Skylar Bagley is seen today in the [facility] for evaluation of right shoulder pain at the request of Dr. Quinten Wright MD      HISTORY:  Skylar Bagley is a 60 year old female, right  -hand dominant, who is seen in consultation at the request of Dr. Wright for right shoulder pain that started  ~3 months ago, 4/2020, without injury. Pain just started and has been getting worse. She has rheumatoid arthritis and thought it was a flare. She tried oral prednisone which helped, but as soon as it was finished the pain returned. Locates pain in the shoulder/side of the arm, up in the neck and  Radiates down the  Arm to the wrist. Not sleeping well, constant tossing and turning, but when asleep no pain. Pain to touch at times. Has numbness and tingling in the hand at times at night. Turning neck causes pain in the neck, shoulder.    Balance issues, falls.     Onset: 4/2020 without incident  Symptoms have been worsening since that time.  Aggrevated by: laying down, using arm, using fingers  Relieved by: \"nothing\"  Present symptoms: pain with ADL's (dressing),  pain with overhead activities,  pain reaching behind back,  pain reaching out or away from body (flexion/ abduction),  positional night pain, sometimes, but not painful when asleep  pain lifting,  weakness with lifting,  Pain location: lateral shoulder and deltoid and upper arm  Pain severity: 10/10  Pain quality: dull, aching and sharp  Frequency of symptoms: are constant  Associated symptoms: neck pain, radiating pain to the wrist, numbness and tingling.    Treatment " Interval History: see hospital course    Review of Systems   Constitutional:  Negative for chills and fever.   HENT:  Negative for ear pain.    Respiratory:  Negative for cough and shortness of breath.    Cardiovascular:  Negative for chest pain.   Genitourinary:  Negative for dysuria and hematuria.   Musculoskeletal:  Negative for myalgias.   Skin:  Negative for rash.   Neurological:  Positive for weakness. Negative for headaches.   Psychiatric/Behavioral:  Negative for confusion.    Objective:     Vital Signs (Most Recent):  Temp: 98.6 °F (37 °C) (11/12/22 0457)  Pulse: 68 (11/12/22 0457)  Resp: 20 (11/11/22 1600)  BP: (!) 147/79 (11/12/22 0457)  SpO2: (!) 93 % (11/12/22 0457)   Vital Signs (24h Range):  Temp:  [98.4 °F (36.9 °C)-98.8 °F (37.1 °C)] 98.6 °F (37 °C)  Pulse:  [61-68] 68  Resp:  [20] 20  SpO2:  [93 %-96 %] 93 %  BP: (111-170)/(59-84) 147/79     Weight: 73.5 kg (162 lb 0.6 oz)  Body mass index is 29.64 kg/m².    Intake/Output Summary (Last 24 hours) at 11/12/2022 0960  Last data filed at 11/12/2022 0350  Gross per 24 hour   Intake --   Output 1500 ml   Net -1500 ml      Physical Exam  Constitutional:       General: She is not in acute distress.  HENT:      Head: Normocephalic and atraumatic.   Eyes:      Extraocular Movements: Extraocular movements intact.      Pupils: Pupils are equal, round, and reactive to light.   Cardiovascular:      Rate and Rhythm: Normal rate and regular rhythm.   Pulmonary:      Effort: Pulmonary effort is normal.      Breath sounds: Normal breath sounds.   Abdominal:      General: Abdomen is flat. Bowel sounds are normal.      Palpations: Abdomen is soft.      Tenderness: There is no abdominal tenderness.   Musculoskeletal:      Cervical back: Normal range of motion and neck supple.      Comments: Global weakness, worse in RLE   Skin:     General: Skin is warm and dry.   Neurological:      General: No focal deficit present.      Mental Status: She is alert.   Psychiatric:          Mood and Affect: Mood normal.         Behavior: Behavior normal.       Significant Labs: All pertinent labs within the past 24 hours have been reviewed.  Recent Lab Results         11/12/22  0535        Albumin/Globulin Ratio 1.1       Albumin 2.7       Alkaline Phosphatase 51       ALT 5       Aniso 2+       AST 10       Baso # 0.05       Basophil % 0.7       BILIRUBIN TOTAL 0.2       BUN 17.0       Calcium 8.5       Chloride 109       CO2 23       Creatinine 0.81       eGFR >60       Eos # 0.13       Eosinophil % 1.7       Globulin, Total 2.5       Glucose 91       Hematocrit 24.8       Hemoglobin 7.4       Hypo Slight       Immature Grans (Abs) 0.02       Immature Granulocytes 0.3       Lymph # 1.36       LYMPH % 18.1       MCH 22.0       MCHC 29.8       MCV 73.8       Microcyte Slight       Mono # 0.52       Mono % 6.9       MPV 9.4       Neut # 5.4       Neut % 72.3       Platelet Estimate Adequate       Platelets 374       Potassium 3.4       PROTEIN TOTAL 5.2       RBC 3.36       RBC Morph Abnormal       RDW 22.8       Sodium 138       WBC 7.5               Significant Imaging: I have reviewed all pertinent imaging results/findings within the past 24 hours.    Diagnostic Results:  MRI Brain W WO Contrast    Result Date: 11/11/2022  EXAMINATION: MRI BRAIN W WO CONTRAST CLINICAL HISTORY: Neuro deficit, acute, stroke suspected; TECHNIQUE: Routine unenhanced brain MRI. COMPARISON: CT 16 February 2017. FINDINGS: Diffusion imaging is negative for acute infarct.  Moderate to severe patchy increased T2 and FLAIR signal in the cerebral white matter is nonspecific but most commonly associated with chronic small vessel ischemic changes.  Subcentimeter focus of susceptibility artifact left periventricular white matter image 16 series 12 could relate to a remote microhemorrhage or cavernoma.  No convincing enhancement here after contrast.  No significant abnormality of the midline structures.  There is moderate  up to this point: oral prednisone. Ibuprofen 800mg twice daily. Icy hot.  Has not tried: ice, Heat, Tylenol, PT and Corticosteroid injection  Prior history of related problems: no prior problems with this area in the past      Other PMH:  has a past medical history of Arthritis, Depressive disorder, Diabetes (H), and Thyroid disease.  Patient Active Problem List   Diagnosis     Type 2 diabetes mellitus without complication, without long-term current use of insulin (H)     Dyslipidemia     Class 1 obesity with serious comorbidity and body mass index (BMI) of 31.0 to 31.9 in adult, unspecified obesity type     Hypothyroidism, unspecified type     Gastroesophageal reflux disease, esophagitis presence not specified     Rheumatoid arthritis, involving unspecified site, unspecified rheumatoid factor presence (H)     Rheumatoid arthritis involving multiple sites with positive rheumatoid factor (H)       Surgical Hx:  has a past surgical history that includes Vag deliv only, prev c-sectn.    Medications:   Current Outpatient Medications:      Abatacept (ORENCIA CLICKJECT) 125 MG/ML SOAJ auto-injector, Inject 1 mL (125 mg) Subcutaneous every 7 days ; refills at clinic visits only., Disp: 4 mL, Rfl: 3     ACE/ARB/ARNI NOT PRESCRIBED, INTENTIONAL,, Please choose reason not prescribed, below, Disp: , Rfl:      ASPIRIN NOT PRESCRIBED (INTENTIONAL), Please choose reason not prescribed, below, Disp: 1 each, Rfl: 0     atorvastatin (LIPITOR) 40 MG tablet, Take 1 tablet (40 mg) by mouth daily, Disp: 90 tablet, Rfl: 3     azithromycin (ZITHROMAX) 250 MG tablet, Two tablets first day, then one tablet daily for four days., Disp: 6 tablet, Rfl: 0     blood glucose monitoring (CONTOUR NEXT TEST) test strip, TEST 4 TIMES A DAY, Disp: , Rfl:      calcium carbonate 500 mg-vitamin D 200 units (OSCAL WITH D;OYSTER SHELL CALCIUM) 500-200 MG-UNIT per tablet, Take 1 tablet by mouth, Disp: , Rfl:      clonazePAM (KLONOPIN) 1 MG tablet, Take 1  generalized atrophy. Signal voids are visible in the intracranial internal carotid arteries bilaterally and in the basilar artery implying gross patency.     No acute intracranial findings. Electronically signed by: Olvin Patel Date:    11/11/2022 Time:    12:43    MRI Lumbar Spine W WO Cont    Result Date: 11/11/2022  EXAMINATION: MRI LUMBAR SPINE W WO CONTRAST CLINICAL HISTORY: Low back pain, cauda equina syndrome suspected; TECHNIQUE: Multiplanar, multisequence MR images were acquired from the thoracolumbar junction to the sacrum without and with the administration of contrast. COMPARISON: CT 7 November 2022 FINDINGS: Moderately motion degraded scan. There is a transitional lumbosacral vertebra which will be considered S1.  There is no significant alignment abnormality.  No loss of vertebral body height.  No significant marrow signal abnormality is evident.  The conus is unremarkable ending at L1-L2. At L1-L2 and L2-L3 there are mild degenerative changes without significant spinal canal or neuroforaminal narrowing. At L3-L4 mild disc bulging with posterior element hypertrophy.  Mild spinal canal narrowing.  Mild bilateral neuroforaminal narrowing. At L4-L5 mild disc bulging with more prominent posterior element hypertrophy.  There is mild-to-moderate spinal canal narrowing.  There is moderate bilateral neuroforaminal narrowing worst on the right. At L5-S1 mild disc bulging with posterior element hypertrophy.  Mild spinal canal narrowing.  Moderate bilateral neuroforaminal narrowing worse on the right. Postcontrast images are significantly motion degraded but no gross pathologic enhancement is evident.     Motion degraded scan with degenerative changes of the lumbar spine as discussed. Electronically signed by: Olvin Patel Date:    11/11/2022 Time:    12:58    US Abdominal Aorta    Result Date: 11/11/2022  EXAMINATION: US ABDOMINAL AORTA CLINICAL HISTORY: chronic thrombus; COMPARISON: None FINDINGS:  tablet (1 mg) by mouth 2 times daily as needed for anxiety, Disp: 30 tablet, Rfl: 0     FLUoxetine (PROZAC) 40 MG capsule, TAKE 1 CAPSULE (40 MG) BY MOUTH DAILY, Disp: 30 capsule, Rfl: 0     fluticasone (FLONASE) 50 MCG/ACT nasal spray, Spray 1 spray into both nostrils daily, Disp: 32 g, Rfl: 0     gabapentin (NEURONTIN) 400 MG capsule, Take 1 capsule (400 mg) by mouth 3 times daily, Disp: 270 capsule, Rfl: 2     ibuprofen (IBU) 800 MG tablet, TAKE 1 TABLET TWICE DAILY AS NEEDED FOR  MODERATE  PAIN, Disp: 90 tablet, Rfl: 0     Lancets 30G MISC, Test 4 times per day. ICD-10 code E11.9, Disp: , Rfl:      levothyroxine (SYNTHROID/LEVOTHROID) 100 MCG tablet, TAKE 1 TABLET EVERY DAY, Disp: 90 tablet, Rfl: 3     loratadine-pseudoePHEDrine (CLARITIN-D 12-HOUR) 5-120 MG 12 hr tablet, Take 1 tablet by mouth 2 times daily, Disp: 60 tablet, Rfl: 2     metFORMIN (GLUCOPHAGE) 850 MG tablet, TAKE 1 TABLET TWICE DAILY WITH MEALS, Disp: 180 tablet, Rfl: 0     predniSONE (DELTASONE) 5 MG tablet, Prednisone 20mg daily x5days, then 15mg daily x5days, then 10mg daily x5days, then 5mg daily x5days, then stop., Disp: 50 tablet, Rfl: 0     traZODone (DESYREL) 100 MG tablet, TAKE 2 TABLETS (200 MG) BY MOUTH AT NIGHT AS NEEDED FOR SLEEP   (SUBSTITUTED FOR DESYREL) (Patient taking differently: At Bedtime ), Disp: 180 tablet, Rfl: 0     predniSONE (DELTASONE) 5 MG tablet, Prednisone 20mg daily x5days, then 15mg daily x5days, then 10mg daily x5days, then 5mg daily x5days, then stop. (Patient not taking: Reported on 7/27/2020), Disp: 50 tablet, Rfl: 0     risperiDONE (RISPERDAL) 3 MG tablet, Take 1 tablet (3 mg) by mouth 2 times daily (Patient not taking: Reported on 7/27/2020), Disp: 90 tablet, Rfl: 0    Allergies:   Allergies   Allergen Reactions     Asa [Aspirin]      Methotrexate Other (See Comments)       Social Hx:  reports that she has been smoking cigarettes. She has a 10.00 pack-year smoking history. She has never used smokeless  "tobacco. She reports that she does not drink alcohol or use drugs.    Family Hx: family history includes Glaucoma in her maternal grandfather..    REVIEW OF SYSTEMS: 10 point ROS neg other than the symptoms noted above in the HPI and PMH. Notables include  CONSTITUTIONAL:NEGATIVE for fever, chills, change in weight  INTEGUMENTARY/SKIN: NEGATIVE for worrisome rashes, moles or lesions  MUSCULOSKELETAL:See HPI above  NEURO: NEGATIVE for weakness, dizziness or paresthesias    PHYSICAL EXAM:  /79   Pulse 85   Ht 1.562 m (5' 1.5\")   Wt 78 kg (172 lb)   BMI 31.97 kg/m     GENERAL APPEARANCE: healthy, alert, no distress  SKIN: no suspicious lesions or rashes  NEURO: Normal strength and tone, mentation intact and speech normal  PSYCH:  mentation appears normal and affect normal, not anxious  RESPIRATORY: No increased work of breathing.  VASCULAR: Radial pulses 2+ and brisk cappillary refill   LYMPH: no palpable axillary lymphadenopathy or cervical neck lymphadenopathy.      MUSCULOSKELETAL:    NECK:  Cervical range of motion: limited, causes pain in neck, causes pain into shoulder  Posterior cervical spine tender to palpation over midline bony prominences  There is tender to palpation along neck paraspinals and trapezius muscles      RIGHT UPPER EXTREMITY:  Sensation intact to light touch in median, radial, ulnar and axillary nerve distributions  Palpable 2+ radial pulse, brisk capillary refill to all fingers, wwp  Intact epl fpl fdp edc wrist flexion/extension biceps triceps deltoid    RIGHT SHOULDER:  Shoulder Inspection: no swelling, bruising, discoloration, or obvious deformity or asymmetry  Tender: AC joint, acromion, anterior capsule, proximal bicep tendon, greater tuberosity, proximal humerus, supraspinatus , infraspinatus, upper trapezius muscle, rhomboids, deltoid, arm, elbow, forearm  Non-tender: SC joint  Range of Motion:   Active:forward flexion 160 degrees, external rotation  60 degrees, internal " Transabdominal scanning targeting the aorta.  The abdominal aorta measures up to 1.6 cm proximally, 1.5 cm in its midportion and 1.3 cm distally.  There is moderate atherosclerotic disease.     Negative for abdominal aortic aneurysm. Electronically signed by: Olvin Patel Date:    11/11/2022 Time:    08:35        rotation  hip pocket  Strength: forward flexion 4+/5, painful, limited by pain, External rotation 4+/5, limited by pain  Liftoff: Able  Impingement: all grade 2 positive  Special tests: Belly Press: Negative  Empty Can: Negative    LEFT UPPER EXTREMITY:  Sensation intact to light touch in median, radial, ulnar and axillary nerve distributions  Palpable 2+ radial pulse, brisk capillary refill to all fingers, wwp  Intact epl fpl fdp edc wrist flexion/extension biceps triceps deltoid    LEFT SHOULDER:  Shoulder Inspection: no swelling, bruising, discoloration, or obvious deformity or asymmetry  Tender: AC joint, acromion, anterior capsule, proximal bicep tendon, greater tuberosity, proximal humerus, supraspinatus , infraspinatus, upper trapezius muscle and rhomboids  Non-tender: SC joint  Range of Motion:   Active:forward flexion 160 degrees, external rotation  60 degrees, internal rotation just below bra line.  Strength: forward flexion 5-/5, External rotation 5-/5  Liftoff: Able  Impingement: equivocal.  Special tests: Belly Press: Negative  Empty Can: Negative      X-RAY INTERPRETATION: internal rotation, external rotation, Y views right  shoulder previously obtained 6/25/2020 were reviewed personally in clinic today with the patient. On my review, mild acromio-clavicular degenerative changes. Lung granuloma      MRI right  shoulder:  7/14/2020  1. On a background of tendinosis, moderate grade bursal sided tear of  the anterior supraspinatus at the footprint.     2. Moderate to high-grade bursal sided tearing of the junctional  fibers of the posterior supraspinatus and anterior infraspinatus at  the musculotendinous junction.     3. Subacromial-subdeltoid and subcoracoid bursitis. This may be  related to the patient's history of rheumatoid arthritis.     4. Effacement of the coracohumeral ligament, which can be seen in the  clinical entity of adhesive capsulitis.        ASSESSMENT: Skylar Bagley is a 60 year old  female, right  -hand dominant with subacute right shoulder pain, impingement, subacromial bursitis and rotator cuff tendonitis, partial thickness supraspinatus tear, neck pain.      PLAN:   * Reviewed imaging studies with patient. Also, clinical exam findings. Consistent with impingement, bursitis/tendonitis, radicular neck pain.  * pain down the neck and arm to the wrist likely from the neck.    Treatment:    * Rest  * Activity modification - avoid activities that aggravate symptoms or started symptoms at onset.  * NSAIDS - regular use for inflammation, with food, as long as no contra-indications. Please discuss with pcp if needed.  * Ice twice daily to three times daily, 15-20 minutes at a time  * heat may be beneficial prior to exercising  * Physical Therapy for strengthening, stretching and range of motion exercises of rotator cuff and periscapular stabilization.  * Tylenol as needed for pain  * Injections: cortisone injections may be beneficial to help decrease swelling and inflammation within the shoulder or bursa, and decrease pain. With decreased pain, Physical Therapy and exercises will be more effective and efficient. Patient elected to proceed.  * Return to clinic as needed.        Rell Baptiste M.D., M.S.  Dept. of Orthopaedic Surgery  [facility]    Large Joint Injection/Arthocentesis: R subacromial bursa    Date/Time: 7/27/2020 9:11 AM  Performed by: Catracho Pearson PA  Authorized by: Rell Baptiste MD     Indications:  Pain  Indications comment:  Impingement  Needle Size:  22 G  Guidance: landmark guided    Approach:  Posterolateral  Location:  Shoulder      Site:  R subacromial bursa  Medications:  80 mg triamcinolone 40 MG/ML; 4 mL bupivacaine 0.25 %  Outcome:  Tolerated well, no immediate complications  Procedure discussed: discussed risks, benefits, and alternatives    Consent Given by:  Patient  Prep: patient was prepped and draped in usual sterile fashion

## 2022-12-27 ENCOUNTER — TRANSFERRED RECORDS (OUTPATIENT)
Dept: HEALTH INFORMATION MANAGEMENT | Facility: CLINIC | Age: 62
End: 2022-12-27

## 2023-08-18 NOTE — TELEPHONE ENCOUNTER
Done for fax. Talib Joshua MD   [Muscle Pain] : muscle pain [Negative] : Heme/Lymph [FreeTextEntry9] : leg cramps

## 2024-06-18 NOTE — LETTER
January 24, 2020    Skylar Bagley  [address]  [facility] [address]          Dear Skylar,    It was a pleasure to see you at your recent office visit.  Your test results are listed below.  Thyroid is normal. Sodium and potassium normal. Blood sugar (glucose) normal.  Creatinine and GFR normal, which means kidney function is normal.   A1c is to goal at 5.9.  Please discuss these labs at your diabetic checkup with Dr. Cortez        If you have any questions or concerns, please call the clinic at [phone].    Sincerely,  Rhea Bhatt PA-C    Results for orders placed or performed in visit on 01/23/20   Hemoglobin A1c     Status: Abnormal   Result Value Ref Range    Hemoglobin A1C 5.9 (H) 0 - 5.6 %   TSH with free T4 reflex     Status: None   Result Value Ref Range    TSH 3.99 0.40 - 4.00 mU/L   Basic metabolic panel     Status: None   Result Value Ref Range    Sodium 136 133 - 144 mmol/L    Potassium 3.9 3.4 - 5.3 mmol/L    Chloride 107 94 - 109 mmol/L    Carbon Dioxide 21 20 - 32 mmol/L    Anion Gap 8 3 - 14 mmol/L    Glucose 93 70 - 99 mg/dL    Urea Nitrogen 13 7 - 30 mg/dL    Creatinine 0.96 0.52 - 1.04 mg/dL    GFR Estimate 64 >60 mL/min/[1.73_m2]    GFR Estimate If Black 74 >60 mL/min/[1.73_m2]    Calcium 9.3 8.5 - 10.1 mg/dL   Albumin Random Urine Quantitative with Creat Ratio     Status: None   Result Value Ref Range    Creatinine Urine 14 mg/dL    Albumin Urine mg/L <5 mg/L    Albumin Urine mg/g Cr Unable to calculate due to low value 0 - 25 mg/g Cr                        Notice from care team patient has discharged from care facility and MTM referral to be placed for medication review.   Discharge date: TCU to home on 6/18     Primary Provider Clinic Location:  [address] [address]  [phone]  PCP Listed: Damian Russ MD, MD    Action:  MTM referral placed    Priscilla Christopher, Pharm.D, BCACP  Medication Therapy Management Pharmacist  [phone]